# Patient Record
Sex: FEMALE | Race: WHITE | Employment: UNEMPLOYED | URBAN - METROPOLITAN AREA
[De-identification: names, ages, dates, MRNs, and addresses within clinical notes are randomized per-mention and may not be internally consistent; named-entity substitution may affect disease eponyms.]

---

## 2017-07-24 ENCOUNTER — HOSPITAL ENCOUNTER (OUTPATIENT)
Dept: LAB | Age: 57
Discharge: HOME | End: 2017-07-24
Payer: COMMERCIAL

## 2017-07-24 PROCEDURE — 36415 COLL VENOUS BLD VENIPUNCTURE: CPT | Performed by: PSYCHIATRY & NEUROLOGY

## 2017-07-24 PROCEDURE — 80177 DRUG SCRN QUAN LEVETIRACETAM: CPT | Performed by: PSYCHIATRY & NEUROLOGY

## 2017-07-26 LAB — LEVETIRACETAM SERPL-MCNC: 29.1 UG/ML (ref 10–40)

## 2018-03-14 ENCOUNTER — APPOINTMENT (OUTPATIENT)
Dept: GENERAL RADIOLOGY | Age: 58
End: 2018-03-14
Attending: EMERGENCY MEDICINE
Payer: COMMERCIAL

## 2018-03-14 ENCOUNTER — APPOINTMENT (OUTPATIENT)
Dept: CT IMAGING | Age: 58
End: 2018-03-14
Attending: NURSE PRACTITIONER
Payer: COMMERCIAL

## 2018-03-14 ENCOUNTER — HOSPITAL ENCOUNTER (EMERGENCY)
Age: 58
Discharge: HOME | End: 2018-03-14
Attending: EMERGENCY MEDICINE
Payer: COMMERCIAL

## 2018-03-14 VITALS
WEIGHT: 168 LBS | OXYGEN SATURATION: 97 % | DIASTOLIC BLOOD PRESSURE: 91 MMHG | RESPIRATION RATE: 18 BRPM | BODY MASS INDEX: 28.68 KG/M2 | TEMPERATURE: 97.9 F | SYSTOLIC BLOOD PRESSURE: 143 MMHG | HEART RATE: 81 BPM | HEIGHT: 64 IN

## 2018-03-14 DIAGNOSIS — M54.12 CERVICAL RADICULOPATHY: Primary | ICD-10-CM

## 2018-03-14 DIAGNOSIS — R03.0 ELEVATED BLOOD PRESSURE READING: ICD-10-CM

## 2018-03-14 DIAGNOSIS — R93.7 ABNORMAL CT SCAN, CERVICAL SPINE: ICD-10-CM

## 2018-03-14 PROCEDURE — 74011250637 HC RX REV CODE- 250/637: Performed by: NURSE PRACTITIONER

## 2018-03-14 PROCEDURE — 93005 ELECTROCARDIOGRAM TRACING: CPT

## 2018-03-14 PROCEDURE — 73030 X-RAY EXAM OF SHOULDER: CPT

## 2018-03-14 PROCEDURE — 72125 CT NECK SPINE W/O DYE: CPT

## 2018-03-14 PROCEDURE — 99283 EMERGENCY DEPT VISIT LOW MDM: CPT

## 2018-03-14 RX ORDER — LEVOTHYROXINE SODIUM 100 UG/1
100 TABLET ORAL
COMMUNITY

## 2018-03-14 RX ORDER — IBUPROFEN 600 MG/1
600 TABLET ORAL
Qty: 20 TAB | Refills: 0 | Status: SHIPPED | OUTPATIENT
Start: 2018-03-14 | End: 2021-06-23

## 2018-03-14 RX ORDER — PRAVASTATIN SODIUM 40 MG/1
40 TABLET ORAL
COMMUNITY
End: 2021-01-19

## 2018-03-14 RX ORDER — BISMUTH SUBSALICYLATE 262 MG
1 TABLET,CHEWABLE ORAL DAILY
COMMUNITY

## 2018-03-14 RX ORDER — OXYCODONE AND ACETAMINOPHEN 5; 325 MG/1; MG/1
1 TABLET ORAL
Qty: 12 TAB | Refills: 0 | Status: SHIPPED | OUTPATIENT
Start: 2018-03-14 | End: 2021-06-23

## 2018-03-14 RX ORDER — PREDNISONE 10 MG/1
TABLET ORAL
Qty: 30 TAB | Refills: 0 | Status: SHIPPED | OUTPATIENT
Start: 2018-03-14 | End: 2019-01-07 | Stop reason: ALTCHOICE

## 2018-03-14 RX ORDER — OXYCODONE AND ACETAMINOPHEN 5; 325 MG/1; MG/1
1 TABLET ORAL
Status: COMPLETED | OUTPATIENT
Start: 2018-03-14 | End: 2018-03-14

## 2018-03-14 RX ADMIN — OXYCODONE HYDROCHLORIDE AND ACETAMINOPHEN 1 TABLET: 5; 325 TABLET ORAL at 22:33

## 2018-03-14 NOTE — ED TRIAGE NOTES
Patient states left shoulder pain x couple weeks. Denies any known injury. Patient states worsening of left shoulder pain over past three days.

## 2018-03-15 ENCOUNTER — OFFICE VISIT (OUTPATIENT)
Dept: ORTHOPEDIC SURGERY | Age: 58
End: 2018-03-15

## 2018-03-15 VITALS
HEART RATE: 98 BPM | RESPIRATION RATE: 18 BRPM | TEMPERATURE: 97.4 F | HEIGHT: 64 IN | SYSTOLIC BLOOD PRESSURE: 148 MMHG | OXYGEN SATURATION: 98 % | WEIGHT: 173 LBS | DIASTOLIC BLOOD PRESSURE: 77 MMHG | BODY MASS INDEX: 29.53 KG/M2

## 2018-03-15 DIAGNOSIS — M25.511 ACUTE PAIN OF RIGHT SHOULDER: Primary | ICD-10-CM

## 2018-03-15 DIAGNOSIS — M79.18 MYOFASCIAL PAIN: ICD-10-CM

## 2018-03-15 DIAGNOSIS — M54.12 CERVICAL RADICULOPATHY: ICD-10-CM

## 2018-03-15 LAB
ATRIAL RATE: 75 BPM
CALCULATED P AXIS, ECG09: 61 DEGREES
CALCULATED R AXIS, ECG10: 42 DEGREES
CALCULATED T AXIS, ECG11: -3 DEGREES
DIAGNOSIS, 93000: NORMAL
P-R INTERVAL, ECG05: 164 MS
Q-T INTERVAL, ECG07: 394 MS
QRS DURATION, ECG06: 80 MS
QTC CALCULATION (BEZET), ECG08: 439 MS
VENTRICULAR RATE, ECG03: 75 BPM

## 2018-03-15 RX ORDER — ASPIRIN 81 MG/1
325 TABLET ORAL DAILY
COMMUNITY
End: 2021-06-23

## 2018-03-15 NOTE — PROGRESS NOTES
[address]  [address]  [facility], [address]  Phone: [phone]  Fax: [phone]        Audrey Dalal  : 1960  PCP: Rachel Kohli MD  3/15/2018    NEW PATIENT      ASSESSMENT AND PLAN     Jessica Manning comes in to the office today c/o left shoulder pain that radiates into her arm and neck x 1 month. She rates her pain as a 10/10 today. She c/o paraesthesia in her right arm along a C7 distribution. Her cervical CT revealed multilevel degenerative findings, worse at C5-6 and C4-5, especially on the left side. On examination, she had diffuse tenderness to palpation along her LUE and her left trapezius. She is neurologically intact. She had a positive Spurling's sign and positive Hawkin's test on the L Her pain is likely multifactorial with components of myofascial pain, cervical radiculopathy, and possibly a left rotator cuff injury. I referred to physical therapy. I also referred to orthopedics for evaluation of her left shoulder pain. I advised she begin her steroid pack that she received from the ER yesterday. Pt will f/u in 6 weeks or sooner if needed. Diagnoses and all orders for this visit:    1. Acute pain of right shoulder  -     REFERRAL TO PHYSICAL THERAPY  -     REFERRAL TO ORTHOPEDICS    2. Cervical radiculopathy  -     REFERRAL TO PHYSICAL THERAPY    3. Myofascial pain  -     REFERRAL TO PHYSICAL THERAPY       Follow-up Disposition: Not on File    CHIEF COMPLAINT  Jessica Manning is seen today in consultation at the request of Rachel Kohli MD for complaints of left shoulder pain. HISTORY OF PRESENT ILLNESS  Jessica Manning is a 62 y.o. female c/o left shoulder pain radiating into her arm and neck x 1 month. She has been taking ibuprofen and other NSAIDs for relief. She is a pharmacy tech. Pt denies any fevers, chills, nausea, vomiting.  Pt denies any chest pain and shortness of breath. Pt denies any ear, nose, and throat problems. Pt denies any fecal or urinary incontinence. PAST MEDICAL HISTORY   Past Medical History:   Diagnosis Date    Chronic pain     Diabetes ([facility] [address]     Epilepsy ([facility] [address]     HNP (herniated nucleus pulposus), lumbar     Hot flashes     Hypercholesterolemia     Hypothyroidism     Migraine     MRSA bacteremia     Multilevel degenerative disc disease     Patient reports no asthma    Radiculitis     Right lower extremity    Right leg pain     Seizures (HCC)     Weight loss        Past Surgical History:   Procedure Laterality Date    HX APPENDECTOMY      HX HYSTERECTOMY      HX PELVIC LAPAROSCOPY      HX TUBAL LIGATION         MEDICATIONS    Current Outpatient Prescriptions   Medication Sig Dispense Refill    aspirin delayed-release 81 mg tablet Take  by mouth daily.  levothyroxine (SYNTHROID) 100 mcg tablet Take  by mouth Daily (before breakfast).  pravastatin (PRAVACHOL) 40 mg tablet Take 40 mg by mouth nightly.  multivitamin (ONE A DAY) tablet Take 1 Tab by mouth daily.  predniSONE (STERAPRED DS) 10 mg dose pack SIG:  Take 4 tabs days 1,2,3  Take 3 tabs days 4,5,6  Take 2 tabs days 7,8,9  Take 1 tab days 10, 11, 12 30 Tab 0    oxyCODONE-acetaminophen (PERCOCET) 5-325 mg per tablet Take 1 Tab by mouth every six (6) hours as needed for Pain. Max Daily Amount: 4 Tabs. 12 Tab 0    ibuprofen (MOTRIN) 600 mg tablet Take 1 Tab by mouth every six (6) hours as needed for Pain. 20 Tab 0    cholecalciferol, vitamin D3, (VITAMIN D3) 2,000 unit tab Take  by mouth.  levETIRAcetam (KEPPRA) 1,000 mg tablet Take 1 Tab by mouth two (2) times a day. (Patient taking differently: Take 2,000 mg by mouth two (2) times a day.) 60 Tab 0    cetirizine (ZYRTEC) 10 mg tablet Take  by mouth daily.          ALLERGIES  Allergies   Allergen Reactions    Azithromycin Seizures    Erythromycin Seizures     Reacts with Keppra    Tomato Rash SOCIAL HISTORY    Social History     Social History    Marital status:      Spouse name: N/A    Number of children: N/A    Years of education: N/A     Social History Main Topics    Smoking status: Current Every Day Smoker     Packs/day: 0.25     Years: 36.00    Smokeless tobacco: Current User    Alcohol use Yes      Comment: occasional    Drug use: None    Sexual activity: Not Asked      Comment: Hysterectomy     Other Topics Concern    None     Social History Narrative       FAMILY HISTORY  History reviewed. No pertinent family history. REVIEW OF SYSTEMS  Review of Systems   Musculoskeletal: Positive for neck pain. Left shoulder pain  LUE pain         PHYSICAL EXAMINATION  Visit Vitals    /77    Pulse 98    Temp 97.4 °F (36.3 °C) (Oral)    Resp 18    Ht 5' 4\" (1.626 m)    Wt 173 lb (78.5 kg)    SpO2 98%    BMI 29.7 kg/m2         Pain Assessment  3/15/2018   Location of Pain Neck   Location Modifiers -   Severity of Pain 10   Quality of Pain -   Duration of Pain -   Frequency of Pain Constant   Date Pain First Started -   Aggravating Factors Stretching;Straightening;Bending;Exercise;Kneeling;Squatting;Standing;Walking;Stairs   Aggravating Factors Comment -   Limiting Behavior -   Relieving Factors Nothing   Relieving Factors Comment -   Result of Injury -         Constitutional:  Well developed, well nourished, in no acute distress. Psychiatric: Affect and mood are appropriate. HEENT: Normocephalic, atraumatic. Extraocular movements intact. Integumentary: No rashes or abrasions noted on exposed areas. Cardiovascular: Regular rate and rhythm. Pulmonary: Clear to auscultation bilaterally. SPINE/MUSCULOSKELETAL EXAM    Cervical spine:  Neck is midline. Normal muscle tone. No focal atrophy is noted. ROM painful on L. Shoulder ROM intact. Tenderness to palpation of left trapezius and cervical paraspinals. Positive Spurling's sign on L.    Negative Tinel's sign. Negative Javier's sign. Sensation in the bilateral arms grossly intact to light touch. Lumbar spine:  No rash, ecchymosis, or gross obliquity. No fasciculations. No focal atrophy is noted. No pain with hip ROM. Full range of motion. No tenderness to palpation. No tenderness to palpation at the sciatic notch. SI joints non-tender. Trochanters non tender. Sensation in the bilateral legs grossly intact to light touch. Left upper extremity  Diffuse tenderness to palpation  Painful with internal rotation of left shoulder  Positive Hawkin's sign on L       MOTOR:      Biceps  Triceps Deltoids Wrist Ext Wrist Flex Hand Intrin   Right 5/5 5/5 5/5 5/5 5/5 5/5   Left 5/5 5/5 5/5 5/5 5/5 5/5             Hip Flex  Quads Hamstrings Ankle DF EHL Ankle PF   Right 5/5 5/5 5/5 5/5 5/5 5/5   Left 5/5 5/5 5/5 5/5 5/5 5/5     DTRs are 2+ biceps, triceps, brachioradialis, patella, and Achilles. Negative Straight Leg raise. Squat not tested. No difficulty with tandem gait. Ambulation without assistive device. FWB. RADIOGRAPHS  CT Cervical spine images taken on 3/15/18 personally reviewed with patient:  FINDINGS:     No evidence of acute fracture or subluxation is detected. Uncovertebral  osteophyte development is noted at C3-4, C4-5, C5-6 levels, more pronounced on  the left side in the right, most pronounced at C5-6 followed by C4-5. Related  to the presence of these uncovertebral osteophytes, there is evidence of  foraminal narrowing on the left side at C4-5 and C5-6. Endplate osteophytes are  observed at C3-4, C4-5 and C5-6 levels, with most pronounced endplate  osteophytes at C5-6 followed by C4-5. There is posterior disc-osteophyte  complex at these levels as well which may produce slight central canal  narrowing. Mild facet hypertrophy.     Visualized portions of the lung apices are clear.   No significant prevertebral  soft tissue edema is observed.     IMPRESSION  IMPRESSION:     1. No acute fracture or subluxation. 2.  Degenerative changes of the cervical spine involving the C3-4, C4-5 and C5-6  levels, most pronounced at C5-6 followed by C4-5 especially on the left side. Neural foraminal narrowing on the left side. CT Cervical spine images taken on 3/15/18 personally reviewed with patient:  FINDINGS:      Three views have been obtained. There is no evidence of fracture or dislocation. The joint spaces are maintained. Mineralization is normal.  There is no  radiopaque foreign body.     IMPRESSION  IMPRESSION:     Negative.  reviewed    Ms. Alexys Fuentes has a reminder for a \"due or due soon\" health maintenance. I have asked that she contact her primary care provider for follow-up on this health maintenance. 20 minutes of face-to-face contact were spent with the patient during today's visit extensively discussing symptoms and treatment plan. All questions were answered. More than half of this visit today was spent on counseling. Written by Tk Tijerina, as dictated by Dr. Brandon Cordova. I, Dr. Brandon Cordova, confirm that all documentation is accurate.

## 2018-03-15 NOTE — MR AVS SNAPSHOT
Theo Gabriella 
 
 
 [address] [facility] [address] 
[phone] Patient: Jie Robles MRN: [id number] Visit Information Date & Time Provider Department Dept. Phone Encounter #  
 3/15/2018  1:45 PM Rey Hitchcock MD [facility] [phone] [id number] Follow-up Instructions Return in about 6 weeks (around 4/26/2018). Upcoming Health Maintenance Date Due Hepatitis C Screening 1960 Pneumococcal 19-64 Medium Risk (1 of 1 - PPSV23) 7/11/1979 DTaP/Tdap/Td series (1 - Tdap) 7/11/1981 PAP AKA CERVICAL CYTOLOGY 7/11/1981 FOBT Q 1 YEAR AGE 50-75 7/11/2010 Influenza Age 5 to Adult 8/1/2017 BREAST CANCER SCRN MAMMOGRAM 7/20/2018 Allergies as of 3/15/2018  Review Complete On: 3/15/2018 By: Ysabel Jones LPN Severity Noted Reaction Type Reactions Azithromycin High 07/06/2013    Seizures Erythromycin  04/17/2014    Seizures Reacts with Keppra Tomato    Rash Current Immunizations  Never Reviewed No immunizations on file. Not reviewed this visit You Were Diagnosed With   
  
 Codes Comments Acute pain of right shoulder    -  Primary ICD-10-CM: M25.511 ICD-9-CM: 719.41 Cervical radiculopathy     ICD-10-CM: M54.12 
ICD-9-CM: 723.4 Myofascial pain     ICD-10-CM: M79.1 ICD-9-CM: 729.1 Vitals BP Pulse Temp Resp Height(growth percentile) Weight(growth percentile) 148/77 98 97.4 °F (36.3 °C) (Oral) 18 5' 4\" (1.626 m) 173 lb (78.5 kg) SpO2 BMI OB Status Smoking Status 98% 29.7 kg/m2 Hysterectomy Current Every Day Smoker BMI and BSA Data Body Mass Index Body Surface Area  
 29.7 kg/m 2 1.88 m 2 Preferred Pharmacy Pharmacy Name Phone [id number] [address] [phone] Your Updated Medication List  
  
   
 This list is accurate as of 3/15/18  2:35 PM.  Always use your most recent med list.  
  
  
  
  
 aspirin delayed-release 81 mg tablet Take  by mouth daily. ibuprofen 600 mg tablet Commonly known as:  MOTRIN Take 1 Tab by mouth every six (6) hours as needed for Pain.  
  
 levETIRAcetam 1,000 mg tablet Commonly known as:  KEPPRA Take 1 Tab by mouth two (2) times a day. levothyroxine 100 mcg tablet Commonly known as:  SYNTHROID Take  by mouth Daily (before breakfast). multivitamin tablet Commonly known as:  ONE A DAY Take 1 Tab by mouth daily. oxyCODONE-acetaminophen 5-325 mg per tablet Commonly known as:  PERCOCET Take 1 Tab by mouth every six (6) hours as needed for Pain. Max Daily Amount: 4 Tabs. pravastatin 40 mg tablet Commonly known as:  PRAVACHOL Take 40 mg by mouth nightly. predniSONE 10 mg dose pack Commonly known as:  STERAPRED DS  
SIG: Take 4 tabs days 1,2,3 Take 3 tabs days 4,5,6 Take 2 tabs days 7,8,9 Take 1 tab days 10, 11, 12 VITAMIN D3 2,000 unit Tab Generic drug:  cholecalciferol (vitamin D3) Take  by mouth. ZyrTEC 10 mg tablet Generic drug:  cetirizine Take  by mouth daily. We Performed the Following REFERRAL TO ORTHOPEDICS [PSF567 Custom] Comments:  
 Left shoulder pain REFERRAL TO PHYSICAL THERAPY [GXH41 Custom] Comments:  
 eval and treat Left shoulder pain Neck pain and LUE pain Follow-up Instructions Return in about 6 weeks (around 4/26/2018). Referral Information Referral ID Referred By Referred To  
  
 [id number] KADEEM Shea [place] [facility].   
   [address] [place] [address] [address] Izabel Beal, [address] Phone: [phone] Visits Status Start Date End Date 1 New Request 3/15/18 3/15/19 If your referral has a status of pending review or denied, additional information will be sent to support the outcome of this decision. Referral ID Referred By Referred To  
 [id number] Monicadavid Cheng KADEEM Not Available Visits Status Start Date End Date 1 New Request 3/15/18 3/15/19 If your referral has a status of pending review or denied, additional information will be sent to support the outcome of this decision. Introducing [facility] SERVICES! Xavier Fair introduces [employer] patient portal. Now you can access parts of your medical record, email your doctor's office, and request medication refills online. 1. In your internet browser, go to [email or web address] 2. Click on the First Time User? Click Here link in the Sign In box. You will see the New Member Sign Up page. 3. Enter your [employer] Access Code exactly as it appears below. You will not need to use this code after youve completed the sign-up process. If you do not sign up before the expiration date, you must request a new code. · [employer] Access Code: [email or web address] Expires: 6/12/2018 10:32 PM 
 
4. Enter the last four digits of your Social Security Number (xxxx) and Date of Birth (mm/dd/yyyy) as indicated and click Submit. You will be taken to the next sign-up page. 5. Create a [employer] ID. This will be your [employer] login ID and cannot be changed, so think of one that is secure and easy to remember. 6. Create a [employer] password. You can change your password at any time. 7. Enter your Password Reset Question and Answer. This can be used at a later time if you forget your password. 8. Enter your e-mail address. You will receive e-mail notification when new information is available in [address] 9. Click Sign Up. You can now view and download portions of your medical record. 10. Click the Download Summary menu link to download a portable copy of your medical information. If you have questions, please visit the Frequently Asked Questions section of the [employer] website.  Remember, [employer] is NOT to be used for urgent needs. For medical emergencies, dial 911. Now available from your iPhone and Android! Please provide this summary of care documentation to your next provider. Your primary care clinician is listed as Edwige Panda. If you have any questions after today's visit, please call [phone].

## 2018-03-15 NOTE — ED PROVIDER NOTES
HPI Comments: 8:04 PM   62 y.o. female presents to ED C/O left shoulder. Patient has a HX of chronic pain, seizures, diabetes, HNP, hysterectomy, appendectomy, . Patient reports left shoulder pain for two weeks, but much worse over the last three days. patient denies injury or trauma, denies change in activity level. Patient reports the left shoulder pain radiates down left arm, and also radiates to the top of the chest region and the upper back. Patient reports she has been taking Tyelnol and Motrin without improvementin pain. Patient also reports neck pain. Patient reports she is unable to lay on left arm due to pain but if she moves arm sometimes she can get pain to improve,  Patient repots the left hand also feels weaker. Patient is right hand dominant. Patient smokes 1/4ppd. Patient denies any other symptoms or complaints. The history is provided by the patient. History limited by: No language barrier. Past Medical History:   Diagnosis Date    Chronic pain     [facility] [address]     Epilepsy ([facility] [address]     HNP (herniated nucleus pulposus), lumbar     Hot flashes     Hypercholesterolemia     Hypothyroidism     Migraine     MRSA bacteremia     Multilevel degenerative disc disease     Patient reports no asthma    Radiculitis     Right lower extremity    Right leg pain     Seizures (HCC)     Weight loss        Past Surgical History:   Procedure Laterality Date    HX APPENDECTOMY      HX HYSTERECTOMY      HX PELVIC LAPAROSCOPY      HX TUBAL LIGATION           History reviewed. No pertinent family history. Social History     Social History    Marital status:      Spouse name: N/A    Number of children: N/A    Years of education: N/A     Occupational History    Not on file.      Social History Main Topics    Smoking status: Current Every Day Smoker     Packs/day: 0.25     Years: 36.00    Smokeless tobacco: Not on file    Alcohol use Yes      Comment: occasional    Drug use: Not on file    Sexual activity: Not on file      Comment: Hysterectomy     Other Topics Concern    Not on file     Social History Narrative         ALLERGIES: Azithromycin; Erythromycin; and Tomato    Review of Systems   Constitutional: Negative for appetite change and fever. HENT: Negative for congestion, rhinorrhea and sore throat. Respiratory: Negative for cough, shortness of breath and wheezing. Cardiovascular: Negative for chest pain and leg swelling. Gastrointestinal: Negative for abdominal pain, constipation, diarrhea, nausea and vomiting. Genitourinary: Negative for dysuria. Musculoskeletal: Positive for arthralgias, myalgias and neck pain. Negative for back pain. Neurological: Positive for numbness. Negative for dizziness, syncope and headaches. All other systems reviewed and are negative. Vitals:    03/14/18 1917   BP: (!) 143/91   Pulse: 81   Resp: 18   Temp: 97.9 °F (36.6 °C)   SpO2: 97%   Weight: 76.2 kg (168 lb)   Height: 5' 4\" (1.626 m)            Physical Exam   Constitutional: She is oriented to person, place, and time. She appears well-developed and well-nourished. No distress. Patient appears uncomfortable. HENT:   Head: Atraumatic. Eyes: Conjunctivae and EOM are normal.   Neck: Normal range of motion. Muscular tenderness present. No spinous process tenderness present. Cardiovascular: Normal rate, regular rhythm and normal heart sounds. Pulses:       Radial pulses are 2+ on the right side, and 2+ on the left side. Pulmonary/Chest: Effort normal and breath sounds normal. No respiratory distress. She has no wheezes. She has no rales. Musculoskeletal:        Right shoulder: She exhibits tenderness and pain. She exhibits normal range of motion, no bony tenderness, normal pulse and normal strength. Right elbow: She exhibits normal range of motion, no swelling, no effusion and no deformity.         Right wrist: She exhibits normal range of motion, no tenderness and no bony tenderness. No limitation in ROM of left arm, increased pain with extension of left arm over head.  strength of left hand is 4/5 compared to right, reports too painful to squeeze harder. Positive Spurling's test - left     Neurological: She is alert and oriented to person, place, and time. She exhibits normal muscle tone. Coordination normal.   Skin: Skin is warm and dry. No rash noted. She is not diaphoretic. No erythema. No pallor. Nursing note and vitals reviewed. MDM  Number of Diagnoses or Management Options  Abnormal CT scan, cervical spine:   Cervical radiculopathy:   Elevated blood pressure reading:   Diagnosis management comments: MDM:  Concern for new onset of cervical radiculopthy due to HPI, and positive Spurling's test.  Nurse ordered left shoulder xray in triage. I will order CT cervical spine due to new onset of symptoms. Although low concern for cardiac etiology, will get EKG due to no known injury causing pain.   -Progress -   EKG - Normal sinus rhythm Cannot rule out Anterior infarct (cited on or before 14-MAR-2018) Abnormal ECG When compared with ECG of 03-NOV-2016 11:55, No significant change was found  -left shoulder xray - FINDINGS:      Three views have been obtained. There is no evidence of fracture or dislocation. The joint spaces are maintained. Mineralization is normal.  There is no  radiopaque foreign body. -CT cervical spine - FINDINGS:     No evidence of acute fracture or subluxation is detected. Uncovertebral  osteophyte development is noted at C3-4, C4-5, C5-6 levels, more pronounced on  the left side in the right, most pronounced at C5-6 followed by C4-5. Related  to the presence of these uncovertebral osteophytes, there is evidence of  foraminal narrowing on the left side at C4-5 and C5-6. Endplate osteophytes are  observed at C3-4, C4-5 and C5-6 levels, with most pronounced endplate  osteophytes at C5-6 followed by C4-5.   There is posterior disc-osteophyte  complex at these levels as well which may produce slight central canal  narrowing. Mild facet hypertrophy.     Visualized portions of the lung apices are clear. No significant prevertebral  soft tissue edema is observed.  -patient informed of results, will refer to orthopedist for further evaluation, possible PT referral.  Patient given short course of pain medication, and steroid taper. No concerning neurological deficits noted, no surgical findings on CT cervical spine, patient is appropriate for discharge home. Patient referred to PCP for further evaluation of elevated blood pressure. Patient educated to return to the ED for any new or worsening symptoms. Questions denied. Amount and/or Complexity of Data Reviewed  Tests in the radiology section of CPT®: ordered and reviewed  Discuss the patient with other providers: yes (Dr Lisbet Yañez)          ED Course       Procedures             RESULTS:    XR SHOULDER LT AP/LAT MIN 2 V   Final Result      CT SPINE CERV WO CONT   Final Result          Labs Reviewed - No data to display    Recent Results (from the past 12 hour(s))   EKG, 12 LEAD, INITIAL    Collection Time: 03/14/18  9:01 PM   Result Value Ref Range    Ventricular Rate 75 BPM    Atrial Rate 75 BPM    P-R Interval 164 ms    QRS Duration 80 ms    Q-T Interval 394 ms    QTC Calculation (Bezet) 439 ms    Calculated P Axis 61 degrees    Calculated R Axis 42 degrees    Calculated T Axis -3 degrees    Diagnosis       Normal sinus rhythm  Cannot rule out Anterior infarct (cited on or before 14-MAR-2018)  Abnormal ECG  When compared with ECG of 03-NOV-2016 11:55,  No significant change was found         PROGRESS NOTE:   8:04 PM   Initial assessment completed. Written by Sarahy BRYANT    One or more blood pressure readings were noted elevated during the Pt's presentation in the emergency department this date.   This abnormal reading has been cited in the Pt's diagnosis, and they have been encouraged to follow up with their primary care physician, or referred to a consultant for further evaluation and treatment. DISCHARGE NOTE:    Chari Hare's  results have been reviewed with her. She has been counseled regarding her diagnosis, treatment, and plan. She verbally conveys understanding and agreement of the signs, symptoms, diagnosis, treatment and prognosis and additionally agrees to follow up as discussed. She also agrees with the care-plan and conveys that all of her questions have been answered. I have also provided discharge instructions for her that include: educational information regarding their diagnosis and treatment, and list of reasons why they would want to return to the ED prior to their follow-up appointment, should her condition change. CLINICAL IMPRESSION:    1. Cervical radiculopathy    2. Elevated blood pressure reading    3. Abnormal CT scan, cervical spine        AFTER VISIT PLAN:    Current Discharge Medication List      START taking these medications    Details   predniSONE (STERAPRED DS) 10 mg dose pack SIG:  Take 4 tabs days 1,2,3  Take 3 tabs days 4,5,6  Take 2 tabs days 7,8,9  Take 1 tab days 10, 11, 12  Qty: 30 Tab, Refills: 0      oxyCODONE-acetaminophen (PERCOCET) 5-325 mg per tablet Take 1 Tab by mouth every six (6) hours as needed for Pain. Max Daily Amount: 4 Tabs. Qty: 12 Tab, Refills: 0    Associated Diagnoses: Cervical radiculopathy      ibuprofen (MOTRIN) 600 mg tablet Take 1 Tab by mouth every six (6) hours as needed for Pain.   Qty: 20 Tab, Refills: 0              Follow-up Information     Follow up With Details Comments Contact Info    Wyatt Franlkin MD Schedule an appointment as soon as possible for a visit in 1 week Further evaluation [address] [phone]      [address] [facility], [address] and [address] [facility] Schedule an appointment as soon as possible for a visit in 1 week Further evaluation [address], [address]  [phone]           Written by Marjan GRC

## 2018-03-16 ENCOUNTER — TELEPHONE (OUTPATIENT)
Dept: ORTHOPEDIC SURGERY | Age: 58
End: 2018-03-16

## 2018-03-20 ENCOUNTER — OFFICE VISIT (OUTPATIENT)
Dept: ORTHOPEDIC SURGERY | Facility: CLINIC | Age: 58
End: 2018-03-20

## 2018-03-20 VITALS
SYSTOLIC BLOOD PRESSURE: 128 MMHG | RESPIRATION RATE: 18 BRPM | HEART RATE: 78 BPM | WEIGHT: 170.6 LBS | BODY MASS INDEX: 29.12 KG/M2 | DIASTOLIC BLOOD PRESSURE: 60 MMHG | TEMPERATURE: 96.8 F | OXYGEN SATURATION: 99 % | HEIGHT: 64 IN

## 2018-03-20 DIAGNOSIS — M75.102 ROTATOR CUFF SYNDROME, LEFT: Primary | ICD-10-CM

## 2018-03-20 NOTE — PROGRESS NOTES
Patient: Victoria Burk                MRN: [id number]       SSN: [id number]  YOB: 1960        AGE: 62 y.o. SEX: female  Body mass index is 29.28 kg/(m^2). PCP: Asael Emerson MD  03/20/18    Chief Complaint: Left shoulder pain/numbness/tingling left arm    HISTORY OF PRESENT ILLNESS: Ms. Gauri Luo is a 59-year-old, right-hand dominant female who presents today with one month of left shoulder neck and arm pain. She states that the pain started insidiously. She has pain that radiates past her elbow. It goes down somewhat into her hand. She also notes some persistent achy pain, as well as decreased sensation in the left arm as compared to the right. She has pain in her neck. She has pain in her periscapular musculature. She does not recall any specific events that cause the pain. She is taking Ibuprofen and Tylenol as well as a steroid desire which helps some but has not completely alleviated her pain. She is not currently in physical therapy yet but has been seen for her neck by Dr. Jean Paul Thompson and prescribed physical therapy. She has not had any injections in her shoulder. She was seen in the emergency room where she got x-rays of her left shoulder as well as a CT scan of her neck. She has not had any advanced imaging. She also notes some weakness in her hands. Past Medical History:   Diagnosis Date    Chronic pain     Diabetes ([address]     Epilepsy ([facility] [address]     HNP (herniated nucleus pulposus), lumbar     Hot flashes     Hypercholesterolemia     Hypothyroidism     Migraine     MRSA bacteremia     Multilevel degenerative disc disease     Patient reports no asthma    Radiculitis     Right lower extremity    Right leg pain     Seizures (HCC)     Weight loss        History reviewed. No pertinent family history. Current Outpatient Prescriptions   Medication Sig Dispense Refill    aspirin delayed-release 81 mg tablet Take  by mouth daily.       levothyroxine (SYNTHROID) 100 mcg tablet Take  by mouth Daily (before breakfast).  pravastatin (PRAVACHOL) 40 mg tablet Take 40 mg by mouth nightly.  multivitamin (ONE A DAY) tablet Take 1 Tab by mouth daily.  predniSONE (STERAPRED DS) 10 mg dose pack SIG:  Take 4 tabs days 1,2,3  Take 3 tabs days 4,5,6  Take 2 tabs days 7,8,9  Take 1 tab days 10, 11, 12 30 Tab 0    oxyCODONE-acetaminophen (PERCOCET) 5-325 mg per tablet Take 1 Tab by mouth every six (6) hours as needed for Pain. Max Daily Amount: 4 Tabs. 12 Tab 0    ibuprofen (MOTRIN) 600 mg tablet Take 1 Tab by mouth every six (6) hours as needed for Pain. 20 Tab 0    cholecalciferol, vitamin D3, (VITAMIN D3) 2,000 unit tab Take  by mouth.  levETIRAcetam (KEPPRA) 1,000 mg tablet Take 1 Tab by mouth two (2) times a day. (Patient taking differently: Take 2,000 mg by mouth two (2) times a day.) 60 Tab 0    cetirizine (ZYRTEC) 10 mg tablet Take  by mouth daily. Allergies   Allergen Reactions    Azithromycin Seizures    Erythromycin Seizures     Reacts with Keppra    Tomato Rash       Past Surgical History:   Procedure Laterality Date    HX APPENDECTOMY      HX HYSTERECTOMY      HX PELVIC LAPAROSCOPY      HX TUBAL LIGATION         Social History     Social History    Marital status:      Spouse name: N/A    Number of children: N/A    Years of education: N/A     Occupational History    Not on file.      Social History Main Topics    Smoking status: Current Every Day Smoker     Packs/day: 0.25     Years: 36.00    Smokeless tobacco: Current User    Alcohol use Yes      Comment: occasional    Drug use: Not on file    Sexual activity: Not on file      Comment: Hysterectomy     Other Topics Concern    Not on file     Social History Narrative       REVIEW OF SYSTEMS:      CON: negative for recent weight loss/gain, fever, or chills  EYE: negative for double or blurry vision  ENT: negative for hoarseness  RS:   negative for cough, URI, SOB  CV:  negative for chest pain, palpitations  GI:    negative for blood in stool, nausea/vomiting  :  negative for blood in urine  MS: As per HPI  Other systems reviewed and noted below. PHYSICAL EXAMINATION:  Visit Vitals    /60    Pulse 78    Temp 96.8 °F (36 °C) (Oral)    Resp 18    Ht 5' 4\" (1.626 m)    Wt 170 lb 9.6 oz (77.4 kg)    SpO2 99%    BMI 29.28 kg/m2     Body mass index is 29.28 kg/(m^2). GENERAL: Alert and oriented x3, in no acute distress, well-developed, well-nourished. HEENT: Normocephalic, atraumatic. RESP: Non labored breathing with equal chest rise on inspiration. CV: Well perfused extremities. No cyanosis or clubbing noted. ABDOMEN: Soft, non-tender, non-distended. PHYSICAL EXAMINATION:  Examination of the cervical spine reveals limitation in motion due to pain with flexion, extension, and lateral rotation. She has positive Spurlings bilaterally. Her strength is 5/5 from C5 to T1 in the bilateral upper extremities with no focal neurological deficits. Examination of the left shoulder reveals no obvious deformity. No effusion, warmth, or erythema. She is mildly tender to palpation over the [facility] joint. Her range of motion is full when compared to the right shoulder with forward flexion of 170º, external rotation of 40º, and internal rotation to the lower thoracic spine. She has pain without weakness with supraspinatus, infraspinatus, and subscapularis testing. She has a positive impingement sign. She also has mild pain with biceps stress testing. She has 5/5 strength in the deltoid. She has periscapular muscular tenderness to palpation as well. RADIOGRAPHS:  X-rays of her left shoulder were reviewed in the office today. They do not show any acute or chronic bony abnormalities. No fractures or dislocations. ASSESSMENT/PLAN:  Ms. Padmini Poole is a 66-year-old female with left shoulder and arm pain, as well as neck pain.   I think is multifactorial. She is seeing Dr. Blanca Hill for her neck who has recommended physical therapy. For her shoulder I think she has some rotator cuff tendinitis. I do not think she has a full thickness tear as her strength is good. I recommended we add physical therapy for her shoulder to her neck physical therapy. I wrote a prescription for this today. If her symptoms persist, the next step would be to get advanced imaging like a MRI. I would like to see her back in six weeks to assess how she is doing.           Electronically signed by: Goldy Sharp MD

## 2018-03-20 NOTE — PATIENT INSTRUCTIONS
Rotator Cuff Problems: Care Instructions  Your Care Instructions  The rotator cuff is a group of tendons and muscles around the shoulder that keeps the shoulder joint stable and allows you to raise and rotate your arm. Over time, daily wear and exercise can cause the tendons to rub on the bones of your shoulder. This is called impingement. This condition may cause the tendons to bruise, degenerate, or tear. In many people, these problems do not cause pain. When they do cause pain, you can use rest, physical therapy, ice and heat, and anti-inflammatory medicine to reduce pain and swelling. If you still have pain after trying these treatments, you and your doctor can discuss having a steroid injection or surgery. Follow-up care is a key part of your treatment and safety. Be sure to make and go to all appointments, and call your doctor if you are having problems. It's also a good idea to know your test results and keep a list of the medicines you take. How can you care for yourself at home? · Be safe with medicines. Read and follow all instructions on the label. ¨ If the doctor gave you a prescription medicine for pain, take it as prescribed. ¨ If you are not taking a prescription pain medicine, ask your doctor if you can take an over-the-counter medicine. · Put ice or a cold pack on your shoulder for 10 to 20 minutes at a time. Try to do this every 1 to 2 hours for the next 3 days (when you are awake). Put a thin cloth between the ice pack and your skin. · After 3 days, put a warm, wet towel on your shoulder. This is to relax the muscles and increase blood flow. While holding the towel on your shoulder, lean forward so your arm hangs freely, and gently swing your arm back and forth like a pendulum. You also can do this standing under a warm shower. · Follow your doctor's advice for physical therapy. When your doctor says it is okay, try these stretching exercises. Do them slowly to avoid injury.  Put a warm, wet towel on your shoulder before exercising. Stop any exercise that increases pain. ¨ Range-of-motion exercises. If it is not too painful, stretch your arm in four directions: across the body, up the back, to the side, and overhead. ¨ Pendulum exercise. Lean forward and hold onto a table or the back of a chair with your good arm. Bend at the waist, letting the arm with the sore shoulder hang straight down. Swing your arm back and forth like a pendulum, then in circles that start small and slowly grow larger. This exercise does not use the arm muscles. Instead, use your legs and your hips to create movement that makes your arm swing freely. Try this for about 5 minutes, several times a day. ¨ Wall climbing (to the side). Stand with your side to a wall so that your fingers can just touch it. Then turn so your body is turned slightly toward the wall. Walk the fingers of your injured arm up the wall as high as pain permits. Try not to shrug your shoulder up toward your ear as you move your arm up. Hold that position for a count of 15 to 30 seconds. Walk your fingers down to the starting position. Repeat 2 to 4 times, trying to reach higher each time. ¨ Wall climbing (to the front). Face a wall, standing so your fingers can just touch it. Walk the fingers of your affected arm up the wall as high as pain permits. Try not to shrug your shoulder up toward your ear as you move your arm up. Hold that position for a count of 15 to 30 seconds. Slowly walk your fingers to the starting position. Repeat 2 to 4 times, trying to reach higher each time. · Rest your shoulder when you are not doing stretches and other exercises. Your doctor may tell you to wait for the pain to go away before doing exercises. Do not lift heavy bags of groceries, play sports, or do anything else that makes you twist or stress your shoulder. Avoid activities where you move your affected arm above your head.   When should you call for help?  Call your doctor now or seek immediate medical care if:  ? · You have severe pain. ? · You cannot move your shoulder or arm. ? · You have tingling or numbness in your arm or hand. ? · Your arm or hand is cool or pale. ? Watch closely for changes in your health, and be sure to contact your doctor if:  ? · Your pain gets worse. ? · You have new or worse swelling in your arm or hand. ? · You do not get better as expected. Where can you learn more? Go to http://dina-monika.info/. Enter E207 in the search box to learn more about \"Rotator Cuff Problems: Care Instructions. \"  Current as of: March 21, 2017  Content Version: 11.4  © 5028-3471 [employer]. Care instructions adapted under license by [employer] (which disclaims liability or warranty for this information). If you have questions about a medical condition or this instruction, always ask your healthcare professional. [place] [address] any warranty or liability for your use of this information.

## 2018-03-23 ENCOUNTER — HOSPITAL ENCOUNTER (OUTPATIENT)
Dept: PHYSICAL THERAPY | Age: 58
Discharge: HOME | End: 2018-03-23
Payer: COMMERCIAL

## 2018-03-23 PROCEDURE — 97162 PT EVAL MOD COMPLEX 30 MIN: CPT | Performed by: PHYSICAL THERAPIST

## 2018-03-23 NOTE — PROGRESS NOTES
[facility] [address]  [address] [facility] [address], [address] [phone] fax    Plan of Care/ Statement of Necessity for Physical Therapy Services           Patient name: Jessica Manning Start of Care: 3/23/2018   Referral source: Moreno Mcduffie MD : 1960    Medical Diagnosis: Adhesive capsulitis of left shoulder [M75.02]  Pain in right shoulder [M25.511]  Radiculopathy, cervical region [M54.12]  Myalgia [M79.1]   Onset Date:1 month ago    Treatment Diagnosis: cervical pain, L shoulder pain   Prior Hospitalization: see medical history Provider#: [id number]   Medications: Verified on Patient summary List    Comorbidities: seizures/epilepsy, thyroid issues   Prior Level of Function: Pt reports that she was previously Independent with all activities at home and 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 The Plan of Care and following information is based on the information from the initial evaluation. Assessment/ key information: Pt is a 63 y/o female who presents today with onset of cervical and L UE symptoms beginning without specific cause roughly one month ago. Pt reports that her symptoms are intermittent, but significant enough that she went to the ER 2 weeks ago due to pain. Pt notes that she is unable to sleep secondary to pain, has pain with lifting, carrying, reaching, and upper body dressing. Pt reports no headaches, but also states that this is probably controlled by her seizure medication. Pt reports that the symptoms \"move around\" and are not consistent in location. She notes intermittent L elbow, forearm, hand/finger symptoms as well as shoulder pain and pain into the L cervical spine and surrounding musculature. Pt did have imaging performed. Per examination today, pt has 75% limited cervical AROM and limited strength and ROM of the L shoulder.   Pt has increased symptoms with cervical compression, but no consistent reduction of symptoms with several trials of cervical traction (manual) today. Pt with positive ULTT for ulnar, median, and radial nerves, but with inconsistent distribution. Pt with positive Spurling's test on the L, negative on the R. Pt would benefit from skilled PT intervention to address these limitations and return her to full PLOF. Evaluation Complexity History MEDIUM  Complexity : 1-2 comorbidities / personal factors will impact the outcome/ POC ; Examination HIGH Complexity : 4+ Standardized tests and measures addressing body structure, function, activity limitation and / or participation in recreation  ;Presentation MEDIUM Complexity : Evolving with changing characteristics  ; Clinical Decision Making MEDIUM Complexity : FOTO score of 26-74  Overall Complexity Rating: MEDIUM  Problem List: pain affecting function, decrease ROM, decrease strength, decrease ADL/ functional abilitiies, decrease activity tolerance and decrease flexibility/ joint mobility   Treatment Plan may include any combination of the following: Therapeutic exercise, Therapeutic activities, Neuromuscular re-education, Physical agent/modality, Manual therapy, Patient education, Self Care training and Functional mobility training  Patient / Family readiness to learn indicated by: asking questions, trying to perform skills and interest  Persons(s) to be included in education: patient (P)  Barriers to Learning/Limitations: None  Patient Goal (s): reduce symptoms  Patient Self Reported Health Status: good  Rehabilitation Potential: fair  Short Term Goals: To be accomplished in 2 weeks:  1. Pt to report [place] with HEP. Eval status: HEP issued and reviewed physically/verbally with pt    Long Term Goals: To be accomplished in 4 weeks:  1. Pt to report score of 66 on FOTO, indicating improved tolerance to activity and reduced pain. Eval status: 48 on initial FOTO  2.    Pt to report max level of pain <4/10 with activity, indicating reduced pain and improved mobility. Eval status: max level of pain 10/10  3. Pt to demonstrate full L shoulder AROM/PROM at 160 degrees flexion/abduction, 75 degrees ER (90/90), able to reach C7 with IR behind the back  Eval status: L shoulder AROM 135 degrees flexion, 90 degrees abduction, 60 degrees ER (90/90), able to reach to L4 with behind the back IR  4. Pt to demonstrate full 5/5 L shoulder strength with MMT, indicating improved tolerance to activity and reduced pain. Eval status: 4-/5 shoulder flexion, 4-/5 abduction, 4-/5 ER, 4-/5 IR, 4-/5 bicep, 4-/5 tricep  5. Pt to be able to sleep through the night without waking secondary to pain. Eval status: currently limited secondary to pain  6. Pt to be able to return to full PLOF at home and in the community with improved mobility, strength and stability, and reduced fall risk. Eval status: limited  7. Pt to be able to perform overhead and upper body hygiene/dressing tasks without increased c/o pain or difficulty. Eval status: limited by pain and reduced ROM  Frequency / Duration: Patient to be seen 2 times per week for 8 treatments. Patient/ Caregiver education and instruction: Diagnosis, prognosis, self care, activity modification and exercises   [x]  Plan of care has been reviewed with WIN Porter, PT 3/23/2018 2:50 PM    ________________________________________________________________________    I certify that the above Therapy Services are being furnished while the patient is under my care. I agree with the treatment plan and certify that this therapy is necessary.     Physician's Signature:____________________  Date:____________Time: _________    Please sign and return to [facility] 45  [address] [place], [address] [phone] fax

## 2018-03-23 NOTE — PROGRESS NOTES
PT DAILY TREATMENT NOTE     Patient Name: Mallika Fair  Date:3/23/2018  : 1960  [x]  Patient  Verified  Payor: KAREN SAM / Plan: [address] [place] [address] / Product Type: PPO /    In time:1203  Out time:1233  Total Treatment Time (min): 30  Visit #: 1 of 8    Treatment Area: Adhesive capsulitis of left shoulder [M75.02]  Pain in right shoulder [M25.511]  Radiculopathy, cervical region [M54.12]  Myalgia [M79.1]    SUBJECTIVE  Pain Level (0-10 scale): 8  Any medication changes, allergies to medications, adverse drug reactions, diagnosis change, or new procedure performed?: [x] No    [] Yes (see summary sheet for update)  Subjective functional status/changes:   [] No changes reported  See eval.    OBJECTIVE    30 min []Eval                  []Re-Eval             With   [] TE   [] TA   [] neuro   [] other: Patient Education: [x] Review HEP    [] Progressed/Changed HEP based on:   [] positioning   [] body mechanics   [] transfers   [] heat/ice application    [] other:      Other Objective/Functional Measures: See eval.     Pain Level (0-10 scale) post treatment: 8    ASSESSMENT/Changes in Function: See eval.    Patient will continue to benefit from skilled PT services to modify and progress therapeutic interventions, address functional mobility deficits, address ROM deficits, address strength deficits, analyze and address soft tissue restrictions, analyze and cue movement patterns, analyze and modify body mechanics/ergonomics and assess and modify postural abnormalities to attain remaining goals. []  See Plan of Care  []  See progress note/recertification  []  See Discharge Summary         Progress towards goals / Updated goals:  Short Term Goals: To be accomplished in 2 weeks:  1. Pt to report [place] with HEP. Eval status: HEP issued and reviewed physically/verbally with pt    Long Term Goals: To be accomplished in 4 weeks:  1.     Pt to report score of 66 on FOTO, indicating improved tolerance to activity and reduced pain. Eval status: 48 on initial FOTO  2. Pt to report max level of pain <4/10 with activity, indicating reduced pain and improved mobility. Eval status: max level of pain 10/10  3. Pt to demonstrate full L shoulder AROM/PROM at 160 degrees flexion/abduction, 75 degrees ER (90/90), able to reach C7 with IR behind the back  Eval status: L shoulder AROM 135 degrees flexion, 90 degrees abduction, 60 degrees ER (90/90), able to reach to L4 with behind the back IR  4. Pt to demonstrate full 5/5 L shoulder strength with MMT, indicating improved tolerance to activity and reduced pain. Eval status: 4-/5 shoulder flexion, 4-/5 abduction, 4-/5 ER, 4-/5 IR, 4-/5 bicep, 4-/5 tricep  5. Pt to be able to sleep through the night without waking secondary to pain. Eval status: currently limited secondary to pain  6. Pt to be able to return to full PLOF at home and in the community with improved mobility, strength and stability, and reduced fall risk. Eval status: limited  7. Pt to be able to perform overhead and upper body hygiene/dressing tasks without increased c/o pain or difficulty.   Eval status: limited by pain and reduced ROM    PLAN  []  Upgrade activities as tolerated     []  Continue plan of care  []  Update interventions per flow sheet       []  Discharge due to:_  []  Other:_      Lindsey Chan, PT 3/23/2018  3:37 PM    Future Appointments  Date Time Provider Kyree Choudhury   4/26/2018 1:30 PM Van Hanson  [id number]

## 2018-04-25 NOTE — PROGRESS NOTES
[facility] [place] [address]  [address] [facility] [address], [address] [phone] fax    Discharge Summary  Patient name: Natalia Meza Start of Care: 3/23/2018   Referral source: Maritza Garcia MD : 1960                          Medical Diagnosis: Adhesive capsulitis of left shoulder [M75.02]  Pain in right shoulder [M25.511]  Radiculopathy, cervical region [M54.12]  Myalgia [M79.1] Onset Date:1 month ago                          Treatment Diagnosis: cervical pain, L shoulder pain   Prior Hospitalization: see medical history Provider#: [id number]   Medications: Verified on Patient summary List    Comorbidities: seizures/epilepsy, thyroid issues   Prior Level of Function: Pt reports that she was previously Independent with all activities at home and 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 Visits from Start of Care: 1    Missed Visits: 0    Reporting Period : 3/23/18 to 3/23/18    Short Term Goals: To be accomplished in 2 weeks:  1. Pt to report [place] with HEP. Eval status: HEP issued and reviewed physically/verbally with pt     Long Term Goals: To be accomplished in 4 weeks:  1. Pt to report score of 66 on FOTO, indicating improved tolerance to activity and reduced pain. Eval status: 48 on initial FOTO  2. Pt to report max level of pain <4/10 with activity, indicating reduced pain and improved mobility. Eval status: max level of pain 10/10  3. Pt to demonstrate full L shoulder AROM/PROM at 160 degrees flexion/abduction, 75 degrees ER (90/90), able to reach C7 with IR behind the back  Eval status: L shoulder AROM 135 degrees flexion, 90 degrees abduction, 60 degrees ER (90/90), able to reach to L4 with behind the back IR  4. Pt to demonstrate full 5/5 L shoulder strength with MMT, indicating improved tolerance to activity and reduced pain.   Eval status: 4-/5 shoulder flexion, 4-/5 abduction, 4-/5 ER, 4-/5 IR, 4-/5 bicep, 4-/5 tricep  5. Pt to be able to sleep through the night without waking secondary to pain. Eval status: currently limited secondary to pain  6. Pt to be able to return to full PLOF at home and in the community with improved mobility, strength and stability, and reduced fall risk. Eval status: limited  7. Pt to be able to perform overhead and upper body hygiene/dressing tasks without increased c/o pain or difficulty. Eval status: limited by pain and reduced ROM    Assessment/Summary of care: Ms. Chip Bee was seen for initial evaluation only. She was called X 2 attempts but has not since f/u with our office to schedule. We will discharge from outpatient PT services at this time.       RECOMMENDATIONS:  [x]Discontinue therapy: []Patient has reached or is progressing toward set goals      [x]Patient is non-compliant or has abdicated      []Due to lack of appreciable progress towards set [address], PT 4/25/2018 3:15 PM

## 2018-11-13 ENCOUNTER — OFFICE VISIT (OUTPATIENT)
Dept: ORTHOPEDIC SURGERY | Facility: CLINIC | Age: 58
End: 2018-11-13

## 2018-11-13 VITALS
HEIGHT: 64 IN | SYSTOLIC BLOOD PRESSURE: 134 MMHG | HEART RATE: 91 BPM | OXYGEN SATURATION: 97 % | TEMPERATURE: 97.4 F | BODY MASS INDEX: 28.85 KG/M2 | DIASTOLIC BLOOD PRESSURE: 71 MMHG | WEIGHT: 169 LBS

## 2018-11-13 DIAGNOSIS — M25.612 POST-TRAUMATIC STIFFNESS OF LEFT SHOULDER JOINT: Primary | ICD-10-CM

## 2018-11-13 DIAGNOSIS — M25.512 CHRONIC LEFT SHOULDER PAIN: ICD-10-CM

## 2018-11-13 DIAGNOSIS — M54.12 CERVICAL RADICULOPATHY: ICD-10-CM

## 2018-11-13 DIAGNOSIS — M54.2 NECK PAIN: ICD-10-CM

## 2018-11-13 DIAGNOSIS — G89.29 CHRONIC LEFT SHOULDER PAIN: ICD-10-CM

## 2018-11-13 NOTE — PROGRESS NOTES
Patient: Rachel Garcia                MRN: [id number]       SSN: [id number]  YOB: 1960        AGE: 62 y.o. SEX: female  Body mass index is 29.01 kg/m². PCP: Amee Nation MD  11/13/18    Chief Complaint: Left shoulder and arm pain    HISTORY OF PRESENT ILLNESS:  Thee Vasquez is a 43-year-old female who I have seen before who comes in to the office today for her left shoulder, as well as arm pain. She has had pain in her left shoulder and arm since she had a work-related injury. She fell off of a stool at work on April 11, 2018. She was diagnosed with a nondisplaced greater tuberosity fracture. She was treated conservatively with physical therapy. She subsequently then noticed some pain, numbness, and tingling that radiates down her arm from her shoulder and neck all the way to her fingertips. She says it involves all of her fingertips. It is on the dorsum of her forearm and hand. She also notes pain in her proximal arm and shoulder. She has difficulty with shoulder range of motion. She went to physical therapy for this, but unfortunately, that did not resolve all of her symptoms. She has also done a little bit of physical therapy for her neck but not specifically for her neck. She has had no specific treatment for her neck, and this is a Workers Compensation claim that has been denied, per her telling, of any neck related complaints. Past Medical History:   Diagnosis Date    Chronic pain     Diabetes ([facility] [address]     Epilepsy ([facility]     HNP (herniated nucleus pulposus), lumbar     Hot flashes     Hypercholesterolemia     Hypothyroidism     Migraine     MRSA bacteremia     Multilevel degenerative disc disease     Patient reports no asthma    Radiculitis     Right lower extremity    Right leg pain     Seizures (HCC)     Weight loss        History reviewed. No pertinent family history.     Current Outpatient Medications   Medication Sig Dispense Refill    aspirin delayed-release 81 mg tablet Take  by mouth daily.  levothyroxine (SYNTHROID) 100 mcg tablet Take  by mouth Daily (before breakfast).  pravastatin (PRAVACHOL) 40 mg tablet Take 40 mg by mouth nightly.  multivitamin (ONE A DAY) tablet Take 1 Tab by mouth daily.  predniSONE (STERAPRED DS) 10 mg dose pack SIG:  Take 4 tabs days 1,2,3  Take 3 tabs days 4,5,6  Take 2 tabs days 7,8,9  Take 1 tab days 10, 11, 12 30 Tab 0    oxyCODONE-acetaminophen (PERCOCET) 5-325 mg per tablet Take 1 Tab by mouth every six (6) hours as needed for Pain. Max Daily Amount: 4 Tabs. 12 Tab 0    ibuprofen (MOTRIN) 600 mg tablet Take 1 Tab by mouth every six (6) hours as needed for Pain. 20 Tab 0    cholecalciferol, vitamin D3, (VITAMIN D3) 2,000 unit tab Take  by mouth.  levETIRAcetam (KEPPRA) 1,000 mg tablet Take 1 Tab by mouth two (2) times a day. (Patient taking differently: Take 2,000 mg by mouth two (2) times a day.) 60 Tab 0    cetirizine (ZYRTEC) 10 mg tablet Take  by mouth daily.          Allergies   Allergen Reactions    Azithromycin Seizures    Erythromycin Seizures     Reacts with Keppra    Tomato Rash       Past Surgical History:   Procedure Laterality Date    HX APPENDECTOMY      HX HYSTERECTOMY      HX PELVIC LAPAROSCOPY      HX TUBAL LIGATION         Social History     Socioeconomic History    Marital status:      Spouse name: Not on file    Number of children: Not on file    Years of education: Not on file    Highest education level: Not on file   Social Needs    Financial resource strain: Not on file    Food insecurity - worry: Not on file    Food insecurity - inability: Not on file   [employer] needs - medical: Not on file   [employer] needs - non-medical: Not on file   Occupational History    Not on file   Tobacco Use    Smoking status: Current Every Day Smoker     Packs/day: 0.25     Years: 36.00     Pack years: 9.00    Smokeless tobacco: Current User Substance and Sexual Activity    Alcohol use: Yes     Comment: occasional    Drug use: Not on file    Sexual activity: Not on file     Comment: Hysterectomy   Other Topics Concern    Not on file   Social History Narrative    Not on file       REVIEW OF SYSTEMS:      CON: negative for recent weight loss/gain, fever, or chills  EYE: negative for double or blurry vision  ENT: negative for hoarseness  RS:   negative for cough, URI, SOB  CV:  negative for chest pain, palpitations  GI:    negative for blood in stool, nausea/vomiting  :  negative for blood in urine  MS: As per HPI  Other systems reviewed and noted below. PHYSICAL EXAMINATION:  Visit Vitals  /71 (BP 1 Location: Left arm, BP Patient Position: Sitting)   Pulse 91   Temp 97.4 °F (36.3 °C)   Ht 5' 4\" (1.626 m)   Wt 169 lb (76.7 kg)   SpO2 97%   BMI 29.01 kg/m²     Body mass index is 29.01 kg/m². GENERAL: Alert and oriented x3, in no acute distress, well-developed, well-nourished. HEENT: Normocephalic, atraumatic. RESP: Non labored breathing with equal chest rise on inspiration. CV: Well perfused extremities. No cyanosis or clubbing noted. ABDOMEN: Soft, non-tender, non-distended. Physical exam of the cervical spine reveals some decreased range of motion to the left in rotation. There is no significant decreased motion or pain with forward flexion or extension, as well as lateral rotation to the right. There is a negative Spurlings bilaterally. She is tender to palpation in the paraspinal musculature. There is no midline deformity or step-off. She has tenderness to palpation to the periscapular musculature, as well as over the trapezius. For her left shoulder, she has decreased shoulder range of motion with forward flexion only to about 120° active and passive with pain and attempts past this. External rotation with the arm at the side of 15°, both active and passive, and internal rotation to the posterolateral buttock. This is contrasted with 170° of forward flexion on the right, 45° of external rotation, and internal rotation to the lower thoracic spine. Gentle rotator cuff strength testing is within normal limits and does not produce the type of pain that she gets that is produced with capsular stretch at the extremes of motion. She has a negative Tinels at the carpal and cubital tunnel. She is neurovascularly intact distally. IMAGING:  X-rays of her neck were taken in the office today. These show degenerative changes at C4-5, C5-6, and C6-7 with osteophyte formation anteriorly and disc space narrowing posteriorly. There is also a slight loss of normal cervical lordosis. X-rays of the left shoulder were also taken in the office today. They do not show any acute or chronic bony abnormalities. No significant arthrosis is noted. An MRI of the left shoulder was also reviewed. This was previously done. This does not show any full-thickness rotator cuff tears. This shows a nondisplaced, what appears to be a healing fracture of the greater tuberosity. ASSESSMENT/PLAN:  Grace Mcmanus is a 59-year-old female with left shoulder pain and left arm numbness and tingling that I believe is coming from her neck. She does have shoulder issues and post-traumatic stiffness. She has been in physical therapy for this. Her options are a stretching protocol, which I did recommend, or a procedure to do a lysis of adhesions in her shoulder, such as an arthroscopic capsular release and lysis of adhesions. At this point, she is somewhat fed up with dealing with her symptoms and would like to pursue the surgical route, which I understand. She also has some numbness and tingling that radiates down her arm. Although this was not reported at the time of the injury, I do think it is related to her previous work-related injury.   Certainly, the shoulder, as well as the shoulder surgery, would be related to her work-related injury, as this is what she has been previously treated for. For her neck, I recommended referral to our spine service for further management to treat the cervical radiculopathy. She is going to check with her Workers Compensation to see if she can get care transferred to our office for further treatment of her shoulder injury. I would be happy to treat her and help her in any way that I can.             Electronically signed by: Phillip Brenner MD

## 2018-12-04 ENCOUNTER — OFFICE VISIT (OUTPATIENT)
Dept: ORTHOPEDIC SURGERY | Age: 58
End: 2018-12-04

## 2018-12-04 VITALS
RESPIRATION RATE: 18 BRPM | WEIGHT: 167 LBS | DIASTOLIC BLOOD PRESSURE: 74 MMHG | OXYGEN SATURATION: 97 % | HEIGHT: 64 IN | SYSTOLIC BLOOD PRESSURE: 121 MMHG | TEMPERATURE: 97.1 F | BODY MASS INDEX: 28.51 KG/M2 | HEART RATE: 85 BPM

## 2018-12-04 DIAGNOSIS — M67.922 TENDINOPATHY OF LEFT BICEPS TENDON: ICD-10-CM

## 2018-12-04 DIAGNOSIS — M54.12 CERVICAL RADICULOPATHY: Primary | ICD-10-CM

## 2018-12-04 RX ORDER — GABAPENTIN 300 MG/1
300 CAPSULE ORAL 3 TIMES DAILY
Qty: 90 CAP | Refills: 2 | Status: SHIPPED | OUTPATIENT
Start: 2018-12-04 | End: 2019-01-07 | Stop reason: ALTCHOICE

## 2018-12-04 NOTE — PROGRESS NOTES
[address]  [address], [address]  [place], [address] [place] [address] Street  Phone: [phone]  Fax: [phone]        Quentin Silva  : 1960  PCP: Dale Gomez MD  2018    PROGRESS NOTE      ASSESSMENT AND PLAN    Loretha Cabot comes in to the office today for left-sided neck and shoulder pain with paraesthesia in her LUE. She fell in a grocery store while working and fractured her left shoulder. Since then, she has also c/o numbness and tingling in her LUE. She has had multiple rounds of PT, and she has been under the care of two orthopedics. She has been taking the max dosage of Tylenol and Ibuprofen without much relief. She rates her pain as an 8/10 today. On examination, she has tenderness to palpation of her L biceps and positive provocative testing for a biceps. She has a positive Contreras sign on the L. She has decreased sensation in a C5/6 distribution on the L and possible pain related vs true weakness in an C5/6 distribution on the L. Her pain may be due to a cervical radiculopathy vs biceps tendinopathy. I referred for a cervical MRI. I prescribed Gabapentin 300mg TID. We may consider an EMG in the future. Pt will f/u after MRI or sooner as needed. Diagnoses and all orders for this visit:    1. Cervical radiculopathy  -     MRI CERV SPINE WO CONT; Future  -     gabapentin (NEURONTIN) 300 mg capsule; Take 1 Cap by mouth three (3) times daily. 2. Tendinopathy of left biceps tendon         Follow-up Disposition: Not on File      HISTORY OF PRESENT ILLNESS  Loretha Cabot is a 62 y.o. female. A&P / HPI from 3/15/18:  Loretha Cabot comes in to the office today c/o left shoulder pain that radiates into her arm and neck x 1 month. She rates her pain as a 10/10 today.     She c/o paraesthesia in her right arm along a C7 distribution.  Her cervical CT revealed multilevel degenerative findings, worse at C5-6 and C4-5, especially on the left side.     On examination, she had diffuse tenderness to palpation along her LUE and her left trapezius. She is neurologically intact. She had a positive Spurling's sign and positive Hawkin's test on the L Her pain is likely multifactorial with components of myofascial pain, cervical radiculopathy, and possibly a left rotator cuff injury.      I referred to physical therapy. I also referred to orthopedics for evaluation of her left shoulder pain.     I advised she begin her steroid pack that she received from the ER yesterday.     Pt will f/u in 6 weeks or sooner if needed. HISTORY OF PRESENT ILLNESS  Dagoberto Jacobs is a 62 y.o. female c/o left shoulder pain radiating into her arm and neck x 1 month.     She has been taking ibuprofen and other NSAIDs for relief.      She is a pharmacy tech.      Pt denies any fevers, chills, nausea, vomiting. Pt denies any chest pain and shortness of breath. Pt denies any ear, nose, and throat problems. Pt denies any fecal or urinary incontinence. Updates from 12/04/18:  Pt presents for left-sided neck and shoulder pain with paraesthesia in her LUE. She fell in a grocery store while working and fractured her left shoulder. Since then, she has also c/o numbness and tingling in her LUE. She has had multiple rounds of PT, and she has been under the care of two orthopedics. She has been taking the max dosage of Tylenol and Ibuprofen without much relief.       PAST MEDICAL HISTORY   Past Medical History:   Diagnosis Date    Chronic pain     [facility] [address]     Epilepsy ([facility] [address]     HNP (herniated nucleus pulposus), lumbar     Hot flashes     Hypercholesterolemia     Hypothyroidism     Migraine     MRSA bacteremia     Multilevel degenerative disc disease     Patient reports no asthma    Radiculitis     Right lower extremity    Right leg pain     Seizures (HCC)     Weight loss        Past Surgical History:   Procedure Laterality Date    HX APPENDECTOMY      HX HYSTERECTOMY      HX PELVIC LAPAROSCOPY      HX TUBAL LIGATION     . MEDICATIONS    Current Outpatient Medications   Medication Sig Dispense Refill    aspirin delayed-release 81 mg tablet Take  by mouth daily.  levothyroxine (SYNTHROID) 100 mcg tablet Take  by mouth Daily (before breakfast).  pravastatin (PRAVACHOL) 40 mg tablet Take 40 mg by mouth nightly.  multivitamin (ONE A DAY) tablet Take 1 Tab by mouth daily.  predniSONE (STERAPRED DS) 10 mg dose pack SIG:  Take 4 tabs days 1,2,3  Take 3 tabs days 4,5,6  Take 2 tabs days 7,8,9  Take 1 tab days 10, 11, 12 30 Tab 0    oxyCODONE-acetaminophen (PERCOCET) 5-325 mg per tablet Take 1 Tab by mouth every six (6) hours as needed for Pain. Max Daily Amount: 4 Tabs. 12 Tab 0    ibuprofen (MOTRIN) 600 mg tablet Take 1 Tab by mouth every six (6) hours as needed for Pain. 20 Tab 0    cholecalciferol, vitamin D3, (VITAMIN D3) 2,000 unit tab Take  by mouth.  levETIRAcetam (KEPPRA) 1,000 mg tablet Take 1 Tab by mouth two (2) times a day. (Patient taking differently: Take 2,000 mg by mouth two (2) times a day.) 60 Tab 0    cetirizine (ZYRTEC) 10 mg tablet Take  by mouth daily. ALLERGIES  Allergies   Allergen Reactions    Azithromycin Seizures    Erythromycin Seizures     Reacts with Keppra    Tomato Rash          SOCIAL HISTORY    Social History     Socioeconomic History    Marital status:      Spouse name: Not on file    Number of children: Not on file    Years of education: Not on file    Highest education level: Not on file   Tobacco Use    Smoking status: Current Every Day Smoker     Packs/day: 0.25     Years: 36.00     Pack years: 9.00    Smokeless tobacco: Current User   Substance and Sexual Activity    Alcohol use: Yes     Comment: occasional       FAMILY HISTORY  No family history on file. REVIEW OF SYSTEMS  Review of Systems   Musculoskeletal: Positive for neck pain.         L shoulder pain  LUE paraesthesia          PHYSICAL EXAMINATION  There were no vitals taken for this visit. Pain Assessment  11/13/2018   Location of Pain Arm; Shoulder   Location Modifiers Left   Severity of Pain 10   Quality of Pain Throbbing; Sharp;Dull;Aching   Duration of Pain Persistent   Frequency of Pain Constant   Date Pain First Started -   Aggravating Factors Other (Comment)   Aggravating Factors Comment -   Limiting Behavior Yes   Relieving Factors Nothing   Relieving Factors Comment -   Result of Injury Yes   Work-Related Injury Yes   Type of Injury Fall           Constitutional:  Well developed, well nourished, in no acute distress. Psychiatric: Affect and mood are appropriate. Integumentary: No rashes or abrasions noted on exposed areas. SPINE/MUSCULOSKELETAL EXAM    Cervical spine:  Neck is midline. Normal muscle tone. No focal atrophy is noted. ROM painful on L. Shoulder ROM intact. Tenderness to palpation of left trapezius and cervical paraspinals. Positive Spurling's sign on L. Negative Tinel's sign. Negative Javier's sign. Sensation in the bilateral arms grossly intact to light touch.      Lumbar spine:  No rash, ecchymosis, or gross obliquity. No fasciculations. No focal atrophy is noted. No pain with hip ROM. Full range of motion. No tenderness to palpation. No tenderness to palpation at the sciatic notch. SI joints non-tender. Trochanters non tender.      Sensation in the bilateral legs grossly intact to light touch.     Left upper extremity  Diffuse tenderness to palpation  Painful with internal rotation of left shoulder  Positive Hawkin's sign on L     MOTOR:      Biceps  Triceps Deltoids Wrist Ext Wrist Flex Hand Intrin   Right 5/5 5/5 5/5 5/5 5/5 5/5   Left 5/5 5/5 5/5 5/5 5/5 5/5             Hip Flex  Quads Hamstrings Ankle DF EHL Ankle PF   Right 5/5 5/5 5/5 5/5 5/5 5/5   Left 5/5 5/5 5/5 5/5 5/5 5/5   DTRs are 2+ biceps, triceps, brachioradialis, patella, and Achilles.     Negative Straight Leg raise. Squat not tested. No difficulty with tandem gait.      Ambulation without assistive device. FWB.       RADIOGRAPHS  CT Cervical spine images taken on 3/15/18 personally reviewed with patient:  FINDINGS:     No evidence of acute fracture or subluxation is detected.  Uncovertebral  osteophyte development is noted at C3-4, C4-5, C5-6 levels, more pronounced on  the left side in the right, most pronounced at C5-6 followed by C4-5.  Related  to the presence of these uncovertebral osteophytes, there is evidence of  foraminal narrowing on the left side at C4-5 and C5-6.  Endplate osteophytes are  observed at C3-4, C4-5 and C5-6 levels, with most pronounced endplate  osteophytes at C5-6 followed by C4-5.  There is posterior disc-osteophyte  complex at these levels as well which may produce slight central canal  narrowing.  Mild facet hypertrophy.     Visualized portions of the lung apices are clear.  No significant prevertebral  soft tissue edema is observed.     IMPRESSION  IMPRESSION:     1.  No acute fracture or subluxation. 2.  Degenerative changes of the cervical spine involving the C3-4, C4-5 and C5-6  levels, most pronounced at C5-6 followed by C4-5 especially on the left side. Neural foraminal narrowing on the left side.     CT Cervical spine images taken on 3/15/18 personally reviewed with patient:  FINDINGS:      Three views have been obtained. There is no evidence of fracture or dislocation.   The joint spaces are maintained.  Mineralization is normal.  There is no  radiopaque foreign body.     IMPRESSION  IMPRESSION:     Negative. 20 minutes of face-to-face contact were spent with the patient during today's visit extensively discussing symptoms and treatment plan. All questions were answered.  More than half of this visit today was spent on counseling.      Written by Fay Light as dictated by Bill Nichols MD

## 2018-12-13 ENCOUNTER — HOSPITAL ENCOUNTER (OUTPATIENT)
Age: 58
Discharge: HOME | End: 2018-12-13
Attending: PHYSICAL MEDICINE & REHABILITATION
Payer: COMMERCIAL

## 2018-12-13 DIAGNOSIS — M54.12 CERVICAL RADICULOPATHY: ICD-10-CM

## 2018-12-13 PROCEDURE — 72141 MRI NECK SPINE W/O DYE: CPT

## 2019-01-07 ENCOUNTER — OFFICE VISIT (OUTPATIENT)
Dept: ORTHOPEDIC SURGERY | Age: 59
End: 2019-01-07

## 2019-01-07 VITALS
HEIGHT: 64 IN | HEART RATE: 84 BPM | SYSTOLIC BLOOD PRESSURE: 113 MMHG | WEIGHT: 169 LBS | OXYGEN SATURATION: 96 % | TEMPERATURE: 98 F | BODY MASS INDEX: 28.85 KG/M2 | DIASTOLIC BLOOD PRESSURE: 55 MMHG | RESPIRATION RATE: 18 BRPM

## 2019-01-07 DIAGNOSIS — M54.12 CERVICAL RADICULOPATHY: Primary | ICD-10-CM

## 2019-01-07 DIAGNOSIS — M54.2 NECK PAIN: ICD-10-CM

## 2019-01-07 RX ORDER — PREGABALIN 75 MG/1
75 CAPSULE ORAL 2 TIMES DAILY
Qty: 28 CAP | Refills: 0 | Status: SHIPPED | OUTPATIENT
Start: 2019-01-07 | End: 2021-06-23

## 2019-01-07 RX ORDER — PREGABALIN 150 MG/1
150 CAPSULE ORAL 2 TIMES DAILY
Qty: 60 CAP | Refills: 5 | Status: SHIPPED | OUTPATIENT
Start: 2019-01-07 | End: 2021-06-23

## 2019-01-07 NOTE — PROGRESS NOTES
[address]  [address], [address]  [place], [address] [place] [address] Street  Phone: [phone]  Fax: [phone]        Giovani Mark  : 1960  PCP: Abbi Leiva MD  2019    PROGRESS NOTE      ASSESSMENT AND PLAN    Natasha Tijerina comes in to the office today for cervical MRI f/u. She continues to have left sided neck and shoulder pain radiating into her LUE. The MRI was very uncomfortable for her due to her LUE pain and paraesthesia. She has been experiencing somnolence with Gabapentin. She rates her pain as a 7/10 today. Her MRI revealed multilevel moderate DDD, particularly at C4-7. There is multilevel L>R foraminal stenosis, most significant and moderate to severe at the L C6-7, likely compressing the left C7 nerve root, and left C5-6. There is also a posterior moderate disc extrusion at T4-5 with moderate compression of the ventral cord and multilevel facet arthrosis of the upper thoracic levels. Her pain is likely due to a left C7 radiculopathy. We discussed the option of an EMG vs cervical interlaminar injections. I referred for cervical interlaminar injections. I also prescribed Lyrica 75mg BID for 2 weeks, then Lyrica 150mg BID moving forward. Depending on the results, we may consider further diagnostics vs surgical consult. Pt will f/u after cervical interlaminar injections or sooner as needed. Diagnoses and all orders for this visit:    1. Cervical radiculopathy  -     REFERRAL TO PAIN MANAGEMENT  -     pregabalin (LYRICA) 75 mg capsule; Take 1 Cap by mouth two (2) times a day. Max Daily Amount: 150 mg.  -     pregabalin (LYRICA) 150 mg capsule; Take 1 Cap by mouth two (2) times a day. Max Daily Amount: 300 mg.    2. Neck pain  -     REFERRAL TO PAIN MANAGEMENT      Follow-up Disposition: Not on File      HISTORY OF PRESENT ILLNESS  Natasha Tijerina is a 62 y.o. female.     A&P / HPI from 3/15/18:  Nathan Short in to the office today c/o left shoulder pain that radiates into her arm and neck x 1 month. She rates her pain as a 10/10 today.     She c/o paraesthesia in her right arm along a C7 distribution. Her cervical CT revealed multilevel degenerative findings, worse at C5-6 and C4-5, especially on the left side.     On examination, she had diffuse tenderness to palpation along her LUE and her left trapezius. She is neurologically intact. She had a positive Spurling's sign and positive Hawkin's test on the L Her pain is likely multifactorial with components of myofascial pain, cervical radiculopathy, and possibly a left rotator cuff injury.      I referred to physical therapy. I also referred to orthopedics for evaluation of her left shoulder pain.     I advised she begin her steroid pack that she received from the ER yesterday.     Pt will f/u in 6 weeks or sooner if needed.       HISTORY OF PRESENT ILLNESS  Venita Lofton a 62 y. o. female c/o left shoulder pain radiating into her arm and neck x 1 month.     She has been taking ibuprofen and other NSAIDs for relief.      She is a pharmacy tech.       Pt denies any fevers, chills, nausea, vomiting. Pt denies any chest pain and shortness of breath. Pt denies any ear, nose, and throat problems. Pt denies any fecal or urinary incontinence.      Updates from 12/04/18:  Pt presents for left-sided neck and shoulder pain with paraesthesia in her LUE. She fell in a grocery store while working and fractured her left shoulder. Since then, she has also c/o numbness and tingling in her LUE. She has had multiple rounds of PT, and she has been under the care of two orthopedics. She has been taking the max dosage of Tylenol and Ibuprofen without much relief. Updates from 01/07/19:  Pt presents for cervical MRI f/u. She continues to have left sided neck and shoulder pain radiating into her LUE that is progressively worsening.  The MRI was very uncomfortable for her due to her LUE pain and paraesthesia. She has been experiencing somnolence with Gabapentin, so she does not wish to increase it. PAST MEDICAL HISTORY   Past Medical History:   Diagnosis Date    Chronic pain     Diabetes ([facility] [address]     Epilepsy ([facility] [address]     HNP (herniated nucleus pulposus), lumbar     Hot flashes     Hypercholesterolemia     Hypothyroidism     Migraine     MRSA bacteremia     Multilevel degenerative disc disease     Patient reports no asthma    Radiculitis     Right lower extremity    Right leg pain     Seizures (HCC)     Weight loss        Past Surgical History:   Procedure Laterality Date    HX APPENDECTOMY      HX HYSTERECTOMY      HX PELVIC LAPAROSCOPY      HX TUBAL LIGATION     . MEDICATIONS    Current Outpatient Medications   Medication Sig Dispense Refill    gabapentin (NEURONTIN) 300 mg capsule Take 1 Cap by mouth three (3) times daily. 90 Cap 2    aspirin delayed-release 81 mg tablet Take  by mouth daily.  levothyroxine (SYNTHROID) 100 mcg tablet Take  by mouth Daily (before breakfast).  pravastatin (PRAVACHOL) 40 mg tablet Take 40 mg by mouth nightly.  multivitamin (ONE A DAY) tablet Take 1 Tab by mouth daily.  ibuprofen (MOTRIN) 600 mg tablet Take 1 Tab by mouth every six (6) hours as needed for Pain. 20 Tab 0    cholecalciferol, vitamin D3, (VITAMIN D3) 2,000 unit tab Take  by mouth.  levETIRAcetam (KEPPRA) 1,000 mg tablet Take 1 Tab by mouth two (2) times a day. (Patient taking differently: Take 2,000 mg by mouth two (2) times a day.) 60 Tab 0    cetirizine (ZYRTEC) 10 mg tablet Take  by mouth daily.  oxyCODONE-acetaminophen (PERCOCET) 5-325 mg per tablet Take 1 Tab by mouth every six (6) hours as needed for Pain. Max Daily Amount: 4 Tabs.  12 Tab 0        ALLERGIES  Allergies   Allergen Reactions    Azithromycin Seizures    Erythromycin Seizures     Reacts with Keppra    Tomato Rash          SOCIAL HISTORY    Social History     Socioeconomic History    Marital status:      Spouse name: Not on file    Number of children: Not on file    Years of education: Not on file    Highest education level: Not on file   Tobacco Use    Smoking status: Current Every Day Smoker     Packs/day: 0.25     Years: 36.00     Pack years: 9.00    Smokeless tobacco: Current User   Substance and Sexual Activity    Alcohol use: Yes     Comment: occasional       FAMILY HISTORY  No family history on file. REVIEW OF SYSTEMS  Review of Systems   Musculoskeletal: Positive for neck pain. L shoulder pain  LUE paraesthesia         PHYSICAL EXAMINATION  Visit Vitals  /55   Pulse 84   Temp 98 °F (36.7 °C)   Resp 18   Ht 5' 4\" (1.626 m)   Wt 169 lb (76.7 kg)   SpO2 96%   BMI 29.01 kg/m²       Pain Assessment  11/13/2018   Location of Pain Arm; Shoulder   Location Modifiers Left   Severity of Pain 10   Quality of Pain Throbbing; Sharp;Dull;Aching   Duration of Pain Persistent   Frequency of Pain Constant   Date Pain First Started -   Aggravating Factors Other (Comment)   Aggravating Factors Comment -   Limiting Behavior Yes   Relieving Factors Nothing   Relieving Factors Comment -   Result of Injury Yes   Work-Related Injury Yes   Type of Injury Fall           Constitutional:  Well developed, well nourished, in no acute distress. Psychiatric: Affect and mood are appropriate. Integumentary: No rashes or abrasions noted on exposed areas. SPINE/MUSCULOSKELETAL EXAM    Cervical spine:  Neck is midline. Normal muscle tone. No focal atrophy is noted. ROM painful on L. Shoulder ROM intact.   Tenderness to palpation of left trapezius and cervical paraspinals. Positive Spurling's sign on L. Negative Tinel's sign. Negative Javier's sign.      Sensation in the bilateral arms grossly intact to light touch.      Lumbar spine:  No rash, ecchymosis, or gross obliquity. No fasciculations. No focal atrophy is noted. No pain with hip ROM.    Full range of motion. No tenderness to palpation. No tenderness to palpation at the sciatic notch. SI joints non-tender. Trochanters non tender.     Sensation in the bilateral legs grossly intact to light touch.     Left upper extremity  Diffuse tenderness to palpation  Painful with internal rotation of left shoulder  Positive Hawkin's sign on L     MOTOR:      Biceps  Triceps Deltoids Wrist Ext Wrist Flex Hand Intrin   Right 5/5 5/5 5/5 5/5 5/5 5/5   Left 5/5 5/5 5/5 5/5 5/5 5/5             Hip Flex  Quads Hamstrings Ankle DF EHL Ankle PF   Right 5/5 5/5 5/5 5/5 5/5 5/5   Left 5/5 5/5 5/5 5/5 5/5 5/5     DTRs are 2+ biceps, triceps, brachioradialis, patella, and Achilles.     Negative Straight Leg raise. Squat not tested. No difficulty with tandem gait.      Ambulation without assistive device. FWB.       RADIOGRAPHS  Cervical MRI images taken on 12/13/18 personally reviewed with patient:  HISTORY: neck pain radiating into LUE, decreased sensation and possible weakness  in C5/6 distribution; PT has had multiple rounds of PT     COMPARISON: CT cervical spine 3/14/2018     TECHNIQUE: Multisequence multiplanar imaging through the cervical spine.     FINDINGS:     Alignment: Straightening of cervical lordosis without subluxation. Vertebral body height: Normal  Marrow signal: Unremarkable  Disc spaces: Multilevel disc height loss with endplate osteophytosis  particularly C4-C7     Cervicomedullary Junction: Patent  Cervical cord: Normal signal and morphology Further discussed below where  relevant.     On axial imaging, findings at each level are as follows:     C2/C3: Left paracentral disc osteophyte partially effacing ventral CSF space. Otherwise patent central canal. Mild left facet hypertrophy. Mild left  uncovertebral spurring and left foraminal stenosis.     C3/C4: Mild posterior disc protrusion partially effacing ventral CSF space. AP  canal diameter 9 mm.  Patent neural foramina.     C4/C5: Moderate disc height loss and disc osteophyte complex effacing ventral  CSF space and flattening ventral cord. AP canal diameter 8 mm. Uncovertebral  spurring. Mild to moderate foraminal stenosis.     C5/C6: Moderate disc height loss and disc osteophyte complex partially effacing  ventral CSF space. AP canal diameter 9-10 mm. Moderate to severe left and mild  right foraminal stenosis.     C6/C7: Moderate disc height loss and disc osteophyte complex partially effacing  ventral CSF space. AP canal diameter 9-10 mm. Moderate to severe left foramina  stenosis from what appears to be disc protrusion, most likely impinging the left  C7 nerve root (gradient series 6 image 11). No right foramina stenosis.     C7/T1: No significant disc pathology. Patent canal and foramina.     Other structures: Multilevel facet arthrosis in the upper thoracic levels. T4-5  posterior disc extrusion (6 x 8 mm on sagittal planes) with moderate compression  of the ventral cord.     IMPRESSION  IMPRESSION:     1. Multilevel moderate degenerative disc disease particularly C4-C7 with  multilevel mild central canal stenosis. - Multilevel left greater than right foraminal stenosis most significant  moderate to severe stenosis at left C6-7 due to disc protrusion with likely  impingement on the left C7 nerve root, and left C5-6 neural foramen due  uncovertebral spurring. 2. T4-5 posterior moderate disc extrusion with moderate compression of the  ventral cord. Multilevel facet arthrosis in the upper thoracic levels. CT Cervical spine images taken on 3/15/18 personally reviewed with patient:  FINDINGS:     No evidence of acute fracture or subluxation is detected.  Uncovertebral  osteophyte development is noted at C3-4, C4-5, C5-6 levels, more pronounced on  the left side in the right, most pronounced at C5-6 followed by C4-5.  Related  to the presence of these uncovertebral osteophytes, there is evidence of  foraminal narrowing on the left side at C4-5 and C5-6.  Endplate osteophytes are  observed at C3-4, C4-5 and C5-6 levels, with most pronounced endplate  osteophytes at C5-6 followed by C4-5.  There is posterior disc-osteophyte  complex at these levels as well which may produce slight central canal  narrowing.  Mild facet hypertrophy.     Visualized portions of the lung apices are clear.  No significant prevertebral  soft tissue edema is observed.     IMPRESSION  IMPRESSION:     1.  No acute fracture or subluxation. 2.  Degenerative changes of the cervical spine involving the C3-4, C4-5 and C5-6  levels, most pronounced at C5-6 followed by C4-5 especially on the left side. Neural foraminal narrowing on the left side.     CT Cervical spine images taken on 3/15/18 personally reviewed with patient:  FINDINGS:      Three views have been obtained. There is no evidence of fracture or dislocation.   The joint spaces are maintained.  Mineralization is normal.  There is no  radiopaque foreign body.     IMPRESSION  IMPRESSION:     Negative. 21 minutes of face-to-face contact were spent with the patient during today's visit extensively discussing symptoms and treatment plan. All questions were answered. More than half of this visit today was spent on counseling.      Written by Carlos Casanova as dictated by Amy Samayoa MD

## 2019-02-11 ENCOUNTER — OFFICE VISIT (OUTPATIENT)
Dept: PAIN MANAGEMENT | Age: 59
End: 2019-02-11

## 2019-02-11 VITALS
OXYGEN SATURATION: 96 % | SYSTOLIC BLOOD PRESSURE: 132 MMHG | HEIGHT: 64 IN | BODY MASS INDEX: 28.85 KG/M2 | WEIGHT: 169 LBS | DIASTOLIC BLOOD PRESSURE: 76 MMHG | HEART RATE: 87 BPM | TEMPERATURE: 96.8 F | RESPIRATION RATE: 20 BRPM

## 2019-02-11 DIAGNOSIS — M48.02 CERVICAL STENOSIS OF SPINAL CANAL: Primary | ICD-10-CM

## 2019-02-11 RX ORDER — FLUTICASONE FUROATE AND VILANTEROL 100; 25 UG/1; UG/1
1 POWDER RESPIRATORY (INHALATION) DAILY
COMMUNITY

## 2019-02-11 RX ORDER — GLIPIZIDE 5 MG/1
5 TABLET, FILM COATED, EXTENDED RELEASE ORAL DAILY
COMMUNITY
End: 2021-01-19

## 2019-02-11 NOTE — PROGRESS NOTES
PEÑA -56% HPI: 
Sarah Bernstein is a 62 y.o. female here for initial evaluation referred from Dr. Yamile Rehman for consideration of cervical epidural steroid injection. She reports an acute onset of pain beginning in April 2018 following a fall from height. She reports neck and arm and shoulder pain since that day. The pain is located at the base of the neck toward the left side with left upper extremity weakness and decreased shoulder active range of motion. The pain is described as burning and stabbing from the cervicothoracic junction across the shoulder to the elbow and wrist region and in the hand she also has some numbness with pins and needles. The left hand dysesthesias alternate between median and ulnar distributions and sometimes include both distributions. She denies any bowel or bladder incontinence or any other focal weakness anywhere. Her symptoms are worsened when she does dishes, shopping, lying supine, or with cervical rotation to the left. Her symptoms improve with sleeping in a recliner, standing with a neutral neck, ibuprofen, Tylenol, and heat. She has not had any electrodiagnostic investigation done. Grant Ortega ROS:Review of systems is negative for fever, chills, nausea, vomiting, diarrhea, constipation, chest pain, shortness of breath, abdominal pain, weakness, trouble swallowing, acute changes in vision, acute changes in hearing, falls, dizziness, bladder incontinence, bowel incontinence, depression, anxiety, suicidal ideation, homicidal ideation, alcohol use. Review of systems positive for neck and left arm pain Imaging: Report from MRI of cervical spine from December 13, 2018 showed,\"\"\"\"\"\"\"\"\"\"\"\"\"\"\"\"FINDINGS: 
  
Alignment: Straightening of cervical lordosis without subluxation. Vertebral body height: Normal 
Marrow signal: Unremarkable Disc spaces: Multilevel disc height loss with endplate osteophytosis 
particularly C4-C7 
  
Cervicomedullary Junction: Patent Cervical cord: Normal signal and morphology Further discussed below where 
relevant. 
  
On axial imaging, findings at each level are as follows: 
  
C2/C3: Left paracentral disc osteophyte partially effacing ventral CSF space. Otherwise patent central canal. Mild left facet hypertrophy. Mild left 
uncovertebral spurring and left foraminal stenosis. 
  
C3/C4: Mild posterior disc protrusion partially effacing ventral CSF space. AP 
canal diameter 9 mm. Patent neural foramina. 
  
C4/C5: Moderate disc height loss and disc osteophyte complex effacing ventral 
CSF space and flattening ventral cord. AP canal diameter 8 mm. Uncovertebral 
spurring. Mild to moderate foraminal stenosis. 
  
C5/C6: Moderate disc height loss and disc osteophyte complex partially effacing 
ventral CSF space. AP canal diameter 9-10 mm. Moderate to severe left and mild 
right foraminal stenosis. 
  
C6/C7: Moderate disc height loss and disc osteophyte complex partially effacing 
ventral CSF space. AP canal diameter 9-10 mm. Moderate to severe left foramina 
stenosis from what appears to be disc protrusion, most likely impinging the left C7 nerve root (gradient series 6 image 11). No right foramina stenosis. 
  
C7/T1: No significant disc pathology. Patent canal and foramina. 
  
Other structures: Multilevel facet arthrosis in the upper thoracic levels. T4-5 
posterior disc extrusion (6 x 8 mm on sagittal planes) with moderate compression 
of the ventral cord. 
  
IMPRESSION IMPRESSION: 
  
1. Multilevel moderate degenerative disc disease particularly C4-C7 with 
multilevel mild central canal stenosis. - Multilevel left greater than right foraminal stenosis most significant 
moderate to severe stenosis at left C6-7 due to disc protrusion with likely 
impingement on the left C7 nerve root, and left C5-6 neural foramen due 
uncovertebral spurring.  
2. T4-5 posterior moderate disc extrusion with moderate compression of the 
 ventral cord. Multilevel facet arthrosis in the upper thoracic levels. \"\"\"\"\"\"\"\"\"\"\"\"\"\"\"\"\"\"\"\"' Vitals:  
 02/11/19 0950 BP: 132/76 Pulse: 87 Resp: 20 Temp: 96.8 °F (36 °C) TempSrc: Oral  
SpO2: 96% Weight: 76.7 kg (169 lb) Height: 5' 4\" (1.626 m) PainSc:   9  
  
 
PE: 
AFVSS, no acute distress, normal body habitus. A&OXs 3. 
normocephalic, atraumatic. Conjugate gaze, clear sclerae. Speech is clear and appropriate. Mood is pleasant and appropriate. Patient is cooperative. Strength for right upper extremity is 5/5 for shoulder abduction, elbow flexion, wrist extension, elbow extension, finger abduction, flexor digitorum profundus to digits 2 through 5. Strength for left upper extremity is 5/5 for shoulder abduction, elbow flexion, wrist extension, elbow extension, finger abduction, 4/5 flexor digitorum profundus to digits 2 through 5. Positive Tinel's at left wrist and left elbow. Decreased active range of motion for cervical flexion, extension, rotation right and left. Significant reproduction of cervicothoracic junction pain with active cervical extension. Cervical rotation to the left is reduced by at least 50% with reproduction of radiating symptoms into the left arm to the elbow. Muscle stretch reflexes for right upper extremity are 2+ for C5, C6, C7. Muscle stretch reflexes for left upper extremity are 1+ for C5, C6, C7. Negative Hoffmans on the right and the left. No hand atrophy was noted bilaterally. Gait is within functional limits. Balance is within functional limits. Sensation to light touch is intact for right C2-T1 and decreased left C2 -T1. Primary Care Physician Abril Shanks [address] [facility] [place] [address] 
[phone] PHQ -- . PHQ over the last two weeks 2/11/2019 PHQ Not Done - Little interest or pleasure in doing things Not at all Feeling down, depressed, irritable, or hopeless Not at all Total Score PHQ 2 0 Assessment/Plan: ICD-10-CM ICD-9-CM 1. Cervical stenosis of spinal canal M48.02 723.0   
  
 
--Defer cervical epidural steroid injection for now. Patient may call as needed following additional suggested workup for consideration of cervical epidural steroid injection at the T1-T2 level where there was noted to be adequate epidural fat. Avoid cervical epidural at the C7/T1 level. If epidural steroid is considered use extreme caution secondary to significant multiple level canal stenosis. --Recommend additional workup possibly with electrodiagnostic studies to rule out a double crush syndrome involving left median and left ulnar nerves and possibly brachial plexus. --Patient to return as needed. GOALS: 
To establish complementary and integrative plan of care to address chronic pain issues while minimizing pharmaceuticals to maximize patient's function improve quality of life. A total of 30 minutes were spent with the patient, of which more than half of the time was spent counseling and/or coordinating care. F/u:. Follow-up Disposition: Not on File Social History Socioeconomic History  Marital status:  Spouse name: Not on file  Number of children: Not on file  Years of education: Not on file  Highest education level: Not on file Social Needs  Financial resource strain: Not on file  Food insecurity - worry: Not on file  Food insecurity - inability: Not on file  Transportation needs - medical: Not on file  Transportation needs - non-medical: Not on file Occupational History  Not on file Tobacco Use  Smoking status: Current Every Day Smoker Packs/day: 0.25 Years: 36.00 Pack years: 9.00  Smokeless tobacco: Current User Substance and Sexual Activity  Alcohol use: Yes Comment: occasional  
 Drug use: Not on file  Sexual activity: Not on file Comment: Hysterectomy Other Topics Concern  Not on file Social History Narrative  Not on file History reviewed. No pertinent family history. Allergies Allergen Reactions  Azithromycin Seizures  Erythromycin Seizures Reacts with Keppra  Tomato Rash Past Medical History:  
Diagnosis Date  Chronic pain  Diabetes ([address]  Epilepsy ([facility] [address]  HNP (herniated nucleus pulposus), lumbar  Hot flashes  Hypercholesterolemia  Hypothyroidism  Migraine  MRSA bacteremia  Multilevel degenerative disc disease Patient reports no asthma  Radiculitis Right lower extremity  Right leg pain  Seizures ([facility] [address]  Weight loss Past Surgical History:  
Procedure Laterality Date  HX APPENDECTOMY  HX HYSTERECTOMY  HX PELVIC LAPAROSCOPY    
 HX TUBAL LIGATION Current Outpatient Medications on File Prior to Visit Medication Sig  
 glipiZIDE SR (GLUCOTROL XL) 5 mg CR tablet Take 5 mg by mouth daily.  fluticasone-vilanterol (BREO ELLIPTA) 100-25 mcg/dose inhaler Take 1 Puff by inhalation daily.  pregabalin (LYRICA) 75 mg capsule Take 1 Cap by mouth two (2) times a day. Max Daily Amount: 150 mg.  
 aspirin delayed-release 81 mg tablet Take 81 mg by mouth daily.  levothyroxine (SYNTHROID) 100 mcg tablet Take 100 mcg by mouth Daily (before breakfast).  pravastatin (PRAVACHOL) 40 mg tablet Take 40 mg by mouth nightly.  multivitamin (ONE A DAY) tablet Take 1 Tab by mouth daily.  ibuprofen (MOTRIN) 600 mg tablet Take 1 Tab by mouth every six (6) hours as needed for Pain.  cholecalciferol, vitamin D3, (VITAMIN D3) 2,000 unit tab Take 5,000 Units by mouth daily.  levETIRAcetam (KEPPRA) 1,000 mg tablet Take 1 Tab by mouth two (2) times a day. (Patient taking differently: Take 2,000 mg by mouth daily.)  cetirizine (ZYRTEC) 10 mg tablet Take 10 mg by mouth daily as needed.   
 pregabalin (LYRICA) 150 mg capsule Take 1 Cap by mouth two (2) times a day. Max Daily Amount: 300 mg.  
 oxyCODONE-acetaminophen (PERCOCET) 5-325 mg per tablet Take 1 Tab by mouth every six (6) hours as needed for Pain. Max Daily Amount: 4 Tabs. No current facility-administered medications on file prior to visit.

## 2019-02-11 NOTE — PROGRESS NOTES
Nursing Notes Patient presents to the office today for a consult with Dr. Roberta Shaffer for injections options for her chronic neck pain. Patient rates her pain at 9/10 on the numerical pain scale. Comments: POC UDS was not performed in office today Any new labs or imaging since last appointment? YES. Pt had an MRI done of her cervical spine in December 2018. Results in [facility]. Have you been to an emergency room (ER) or urgent care clinic since your last visit? NO Have you been hospitalized since your last visit? NO If yes, where, when, and reason for visit? Have you seen or consulted any other health care providers outside of the [address] [place] [address]  since your last visit? YES If yes, where, when, and reason for visit? orthopedic MsYuliana Middleton has a reminder for a \"due or due soon\" health maintenance. I have asked that she contact her primary care provider for follow-up on this health maintenance. Abuse Screening Questionnaire 2/11/2019 Do you ever feel afraid of your partner? Tana Mcdaniels Are you in a relationship with someone who physically or mentally threatens you? Tana Mcdaniels Is it safe for you to go home? [place] Fall Risk Assessment, last 12 mths 2/11/2019 Able to walk? Yes Fall in past 12 months? Yes Fall with injury? Yes  
Number of falls in past 12 months 1 Fall Risk Score 2 PHQ over the last two weeks 2/11/2019 PHQ Not Done - Little interest or pleasure in doing things Not at all Feeling down, depressed, irritable, or hopeless Not at all Total Score PHQ 2 0 The pt does not have an advanced medical directive, POA, or living will. She is not interested in discussing this today.

## 2019-07-17 NOTE — TELEPHONE ENCOUNTER
Caller is Jennie at [facility].    Caller states the 2 prescriptions for the patient did not work, patient is still limited to 60 tablets w/o prior authorization for the qty. 90 oxycontIn.    Call [phone].   Left message for patient RE: appt with Dr. Wendy Aviles for 03-20-18 to arrive @ 9:15am @  office for right shoulder eval.

## 2019-07-22 ENCOUNTER — HOSPITAL ENCOUNTER (OUTPATIENT)
Dept: LAB | Age: 59
Discharge: HOME | End: 2019-07-22

## 2019-07-22 LAB — SENTARA SPECIMEN COL,SENBCF: NORMAL

## 2019-07-22 PROCEDURE — 99001 SPECIMEN HANDLING PT-LAB: CPT

## 2020-06-09 ENCOUNTER — HOSPITAL ENCOUNTER (OUTPATIENT)
Dept: MAMMOGRAPHY | Age: 60
Discharge: HOME | End: 2020-06-09
Attending: FAMILY MEDICINE
Payer: MEDICAID

## 2020-06-09 DIAGNOSIS — Z12.31 ENCOUNTER FOR SCREENING MAMMOGRAM FOR BREAST CANCER: ICD-10-CM

## 2020-06-09 PROCEDURE — 77067 SCR MAMMO BI INCL CAD: CPT

## 2020-10-21 ENCOUNTER — HOSPITAL ENCOUNTER (EMERGENCY)
Age: 60
Discharge: HOME | End: 2020-10-21
Attending: EMERGENCY MEDICINE
Payer: MEDICAID

## 2020-10-21 ENCOUNTER — APPOINTMENT (OUTPATIENT)
Dept: VASCULAR SURGERY | Age: 60
End: 2020-10-21
Attending: NURSE PRACTITIONER
Payer: MEDICAID

## 2020-10-21 ENCOUNTER — APPOINTMENT (OUTPATIENT)
Dept: GENERAL RADIOLOGY | Age: 60
End: 2020-10-21
Attending: NURSE PRACTITIONER
Payer: MEDICAID

## 2020-10-21 VITALS
SYSTOLIC BLOOD PRESSURE: 138 MMHG | DIASTOLIC BLOOD PRESSURE: 68 MMHG | TEMPERATURE: 98.2 F | HEART RATE: 81 BPM | RESPIRATION RATE: 18 BRPM

## 2020-10-21 DIAGNOSIS — I80.02 SAPHENOUS VEIN THROMBOPHLEBITIS, LEFT: ICD-10-CM

## 2020-10-21 DIAGNOSIS — M71.22 SYNOVIAL CYST OF LEFT POPLITEAL SPACE: ICD-10-CM

## 2020-10-21 DIAGNOSIS — M17.12 OSTEOARTHRITIS OF LEFT KNEE, UNSPECIFIED OSTEOARTHRITIS TYPE: Primary | ICD-10-CM

## 2020-10-21 PROCEDURE — 73564 X-RAY EXAM KNEE 4 OR MORE: CPT

## 2020-10-21 PROCEDURE — 99282 EMERGENCY DEPT VISIT SF MDM: CPT

## 2020-10-21 PROCEDURE — 74011250637 HC RX REV CODE- 250/637: Performed by: NURSE PRACTITIONER

## 2020-10-21 PROCEDURE — 93971 EXTREMITY STUDY: CPT

## 2020-10-21 RX ORDER — NAPROXEN 500 MG/1
500 TABLET ORAL 2 TIMES DAILY WITH MEALS
Qty: 20 TAB | Refills: 0 | Status: SHIPPED | OUTPATIENT
Start: 2020-10-21 | End: 2020-10-31

## 2020-10-21 RX ORDER — HYDROCODONE BITARTRATE AND ACETAMINOPHEN 5; 325 MG/1; MG/1
1 TABLET ORAL
Status: COMPLETED | OUTPATIENT
Start: 2020-10-21 | End: 2020-10-21

## 2020-10-21 RX ADMIN — HYDROCODONE BITARTRATE AND ACETAMINOPHEN 1 TABLET: 5; 325 TABLET ORAL at 16:55

## 2020-10-21 NOTE — DISCHARGE INSTRUCTIONS
Patient Education        León's Cyst: Care Instructions  Your Care Instructions     A Baker's cyst is a swelling behind the knee. It may cause pain or stiffness when you bend your knee or straighten it all the way. Baker's cysts are also called popliteal cysts. If you have arthritis or another condition that is the cause of the Baker's cyst, your doctor may treat that condition. A Baker's cyst may go away on its own. If not, or if it is causing a lot of discomfort, your doctor may drain the fluid that has built up behind the knee. In some cases, a Baker's cyst is removed in surgery. There are things you can do at home, such as staying off your leg, to reduce the swelling and pain. Follow-up care is a key part of your treatment and safety. Be sure to make and go to all appointments, and call your doctor if you are having problems. It's also a good idea to know your test results and keep a list of the medicines you take. How can you care for yourself at home? · Rest your knee as much as possible. · Ask your doctor if you can take an over-the-counter pain medicine, such as acetaminophen (Tylenol), ibuprofen (Advil, Motrin), or naproxen (Aleve). Be safe with medicines. Read and follow all instructions on the label. · Use a cane, a crutch, a walker, or another device if you need help to get around. These can help rest your knees. · If you have an elastic bandage, make sure it is snug but not so tight that your leg is numb, tingles, or swells below the bandage. Ask your doctor if you can loosen the bandage if it is too tight. · Follow your doctor's instructions about how much weight you can put on your knee. · Stay at a healthy weight. Being overweight puts extra strain on your knee. When should you call for help? Call 911 anytime you think you may need emergency care. For example, call if:    · You have chest pain, are short of breath, or you cough up blood.    Call your doctor now or seek immediate medical care if:    · You have new or worse pain.     · Your foot is cool or pale or changes color.     · You have tingling, weakness, or numbness in your foot or toes.     · You have signs of a blood clot in your leg (called a deep vein thrombosis), such as:  ? Pain in your calf, back of the knee, thigh, or groin. ? Redness or swelling in your leg. Watch closely for changes in your health, and be sure to contact your doctor if:    · You do not get better as expected. Where can you learn more? Go to http://www.gray.com/  Enter T303 in the search box to learn more about \"Baker's Cyst: Care Instructions. \"  Current as of: March 2, 2020               Content Version: 12.6  © 9545-0114 [employer]. Care instructions adapted under license by [employer] (which disclaims liability or warranty for this information). If you have questions about a medical condition or this instruction, always ask your healthcare professional. [place] [address] any warranty or liability for your use of this information. Patient Education        Knee Arthritis: Care Instructions  Your Care Instructions     Knee arthritis is a breakdown of the cartilage that cushions your knee joint. When the cartilage wears down, your bones rub against each other. This causes pain and stiffness. Knee arthritis tends to get worse with time. Treatment for knee arthritis involves reducing pain, making the leg muscles stronger, and staying at a healthy body weight. The treatment usually does not improve the health of the cartilage, but it can reduce pain and improve how well your knee works. You can take simple measures to protect your knee joints, ease your pain, and help you stay active. Follow-up care is a key part of your treatment and safety. Be sure to make and go to all appointments, and call your doctor if you are having problems.  It's also a good idea to know your test results and keep a list of the medicines you take. How can you care for yourself at home? · Know that knee arthritis will cause more pain on some days than on others. · Stay at a healthy weight. Lose weight if you are overweight. When you stand up, the pressure on your knees from every pound of body weight is multiplied four times. So if you lose 10 pounds, you will reduce the pressure on your knees by 40 pounds. · Talk to your doctor or physical therapist about exercises that will help ease joint pain. ? Stretch to help prevent stiffness and to prevent injury before you exercise. You may enjoy gentle forms of yoga to help keep your knee joints and muscles flexible. ? Walk instead of jog.  ? Ride a bike. This makes your thigh muscles stronger and takes pressure off your knee. ? Wear well-fitting and comfortable shoes. ? Exercise in chest-deep water. This can help you exercise longer with less pain. ? Avoid exercises that include squatting or kneeling. They can put a lot of strain on your knees. ? Talk to your doctor to make sure that the exercise you do is not making the arthritis worse. · Do not sit for long periods of time. Try to walk once in a while to keep your knee from getting stiff. · Ask your doctor or physical therapist whether shoe inserts may reduce your arthritis pain. · If you can afford it, get new athletic shoes at least every year. This can help reduce the strain on your knees. · Use a device to help you do everyday activities. ? A cane or walking stick can help you keep your balance when you walk. Hold the cane or walking stick in the hand opposite the painful knee. ? If you feel like you may fall when you walk, try using crutches or a front-wheeled walker. These can prevent falls that could cause more damage to your knee. ? A knee brace may help keep your knee stable and prevent pain.   ? You also can use other things to make life easier, such as a higher toilet seat and handrails in the bathtub or shower. · Take pain medicines exactly as directed. ? Do not wait until you are in severe pain. You will get better results if you take it sooner. ? If you are not taking a prescription pain medicine, take an over-the-counter medicine such as acetaminophen (Tylenol), ibuprofen (Advil, Motrin), or naproxen (Aleve). Read and follow all instructions on the label. ? Do not take two or more pain medicines at the same time unless the doctor told you to. Many pain medicines have acetaminophen, which is Tylenol. Too much acetaminophen (Tylenol) can be harmful. ? Tell your doctor if you take a blood thinner, have diabetes, or have allergies to shellfish. · Ask your doctor if you might benefit from a shot of steroid medicine into your knee. This may provide pain relief for several months. · Many people take the supplements glucosamine and chondroitin for osteoarthritis. Some people feel they help, but the medical research does not show that they work. Talk to your doctor before you take these supplements. When should you call for help? Call your doctor now or seek immediate medical care if:    · You have sudden swelling, warmth, or pain in your knee.     · You have knee pain and a fever or rash.     · You have such bad pain that you cannot use your knee. Watch closely for changes in your health, and be sure to contact your doctor if you have any problems. Where can you learn more? Go to http://www.gray.com/  Enter W187 in the search box to learn more about \"Knee Arthritis: Care Instructions. \"  Current as of: December 9, 2019               Content Version: 12.6  © 1215-0690 [employer]. Care instructions adapted under license by [employer] (which disclaims liability or warranty for this information).  If you have questions about a medical condition or this instruction, always ask your healthcare professional. [address] any warranty or liability for your use of this information. Patient Education        Superficial Thrombophlebitis: Care Instructions  Your Care Instructions  Superficial thrombophlebitis is inflammation in a vein where a blood clot has formed close to the surface of the skin. You may be able to feel the clot as a firm lump under the skin. The skin over the clot can become red, tender, and warm to the touch. Blood clots in veins close to the skin's surface usually are not serious and often can be treated at home. Sometimes superficial thrombophlebitis spreads to a deeper vein (deep vein thrombosis, or DVT). These deeper clots can be serious, even life-threatening. It is very important that you follow your doctor's instructions, keep all follow-up appointments, and watch for new or worsening symptoms of a clot. Follow-up care is a key part of your treatment and safety. Be sure to make and go to all appointments, and call your doctor if you are having problems. It's also a good idea to know your test results and keep a list of the medicines you take. How can you care for yourself at home? · Take your medicines exactly as prescribed. Call your doctor if you think you are having a problem with your medicine. You will get more details on the specific medicines your doctor prescribes. · Prop up the sore leg or arm on a pillow anytime you sit or lie down. Try to keep it above the level of your heart. To prevent thrombophlebitis  · Exercise. Keep blood moving in your legs to keep new clots from forming. · Get up out of bed as soon as possible after an illness or surgery. · Do not smoke. If you need help quitting, talk to your doctor about stop-smoking programs and medicines. These can increase your chances of quitting for good. · Ask your doctor about compression stockings. These may help prevent blood clots from forming in your legs. But there are different types of stockings, and they need to fit right.  So your doctor will recommend what you need. When should you call for help? Call 911 anytime you think you may need emergency care. For example, call if:    · You have sudden chest pain and shortness of breath, or you cough up blood.     · You vomit blood or what looks like coffee grounds.     · You pass maroon or very bloody stools.     · You passed out (lost consciousness). Call your doctor now or seek immediate medical care if:    · You have signs of a blood clot, such as:  ? Pain in your calf, back of the knee, thigh, or groin. ? Redness and swelling in your leg or groin.     · You notice a new hard, red, or tender area in your leg.     · Your stools are black and tarlike or have streaks of blood. Watch closely for changes in your health, and be sure to contact your doctor if:    · You do not get better as expected. Where can you learn more? Go to http://www.gray.com/  Enter F241 in the search box to learn more about \"Superficial Thrombophlebitis: Care Instructions. \"  Current as of: March 4, 2020               Content Version: 12.6  © 7572-7198 [employer]. Care instructions adapted under license by [employer] (which disclaims liability or warranty for this information). If you have questions about a medical condition or this instruction, always ask your healthcare professional. [place] [address] any warranty or liability for your use of this information.

## 2020-10-21 NOTE — ED NOTES
PA reviewed discharge and prescription instructions with the patient. The patient verbalized understanding. Denies pain, remains alert and ambulatory. No acute distress noted. Needs met.

## 2020-10-21 NOTE — ED PROVIDER NOTES
EMERGENCY DEPARTMENT HISTORY AND PHYSICAL EXAM    3:34 PM      Date: 10/21/2020  Patient Name: Fernando Araiza    History of Presenting Illness     Chief Complaint   Patient presents with    Knee Pain         History Provided By: Patient    Additional History (Context): Fernando Araiza is a 61 y.o. female with past medical history of chronic pain, diabetes, hypothyroidism, migraines, hypercholesterolemia, and seizures who presents with complaints of left knee pain and swelling with extension into the left posterior calf over the last week with gradual worsening. Patient denies any injury or trauma and states the pain started in the back of the knee and now extends down to the proximal half of the calf. She denies any redness, warmth, but does report subjective swelling. She has tried Tylenol and ibuprofen without improvement. She denies any exogenous hormone use/birth control, recent immobilization or surgery, trauma, hemoptysis, shortness of breath, or history of DVT. PCP: Cameron Patricia MD    Current Outpatient Medications   Medication Sig Dispense Refill    naproxen (Naprosyn) 500 mg tablet Take 1 Tab by mouth two (2) times daily (with meals) for 10 days. 20 Tab 0    glipiZIDE SR (GLUCOTROL XL) 5 mg CR tablet Take 5 mg by mouth daily.  fluticasone-vilanterol (BREO ELLIPTA) 100-25 mcg/dose inhaler Take 1 Puff by inhalation daily.  pregabalin (LYRICA) 75 mg capsule Take 1 Cap by mouth two (2) times a day. Max Daily Amount: 150 mg. 28 Cap 0    pregabalin (LYRICA) 150 mg capsule Take 1 Cap by mouth two (2) times a day. Max Daily Amount: 300 mg. 60 Cap 5    aspirin delayed-release 81 mg tablet Take 81 mg by mouth daily.  levothyroxine (SYNTHROID) 100 mcg tablet Take 100 mcg by mouth Daily (before breakfast).  pravastatin (PRAVACHOL) 40 mg tablet Take 40 mg by mouth nightly.  multivitamin (ONE A DAY) tablet Take 1 Tab by mouth daily.       oxyCODONE-acetaminophen (PERCOCET) 5-325 mg per tablet Take 1 Tab by mouth every six (6) hours as needed for Pain. Max Daily Amount: 4 Tabs. 12 Tab 0    ibuprofen (MOTRIN) 600 mg tablet Take 1 Tab by mouth every six (6) hours as needed for Pain. 20 Tab 0    cholecalciferol, vitamin D3, (VITAMIN D3) 2,000 unit tab Take 5,000 Units by mouth daily.  levETIRAcetam (KEPPRA) 1,000 mg tablet Take 1 Tab by mouth two (2) times a day. (Patient taking differently: Take 2,000 mg by mouth daily.) 60 Tab 0    cetirizine (ZYRTEC) 10 mg tablet Take 10 mg by mouth daily as needed. Past History     Past Medical History:  Past Medical History:   Diagnosis Date    Chronic pain     Diabetes ([address]     Epile[facility] [address] [address]     HNP (herniated nucleus pulposus), lumbar     Hot flashes     Hypercholesterolemia     Hypothyroidism     Migraine     MRSA bacteremia     Multilevel degenerative disc disease     Patient reports no asthma    Radiculitis     Right lower extremity    Right leg pain     Seizures (HCC)     Weight loss        Past Surgical History:  Past Surgical History:   Procedure Laterality Date    HX APPENDECTOMY      HX HYSTERECTOMY      HX PELVIC LAPAROSCOPY      HX TUBAL LIGATION         Family History:  History reviewed. No pertinent family history. Social History:  Social History     Tobacco Use    Smoking status: Current Every Day Smoker     Packs/day: 0.25     Years: 36.00     Pack years: 9.00    Smokeless tobacco: Current User   Substance Use Topics    Alcohol use: Yes     Comment: occasional    Drug use: Not on file       Allergies: Allergies   Allergen Reactions    Azithromycin Seizures    Erythromycin Seizures     Reacts with Keppra    Tomato Rash         Review of Systems       Review of Systems   Constitutional: Negative. Negative for chills and fever. HENT: Negative. Negative for congestion, ear pain and rhinorrhea. Eyes: Negative. Negative for pain and redness. Respiratory: Negative.   Negative for cough and shortness of breath. Cardiovascular: Positive for leg swelling. Negative for chest pain and palpitations. Gastrointestinal: Negative. Negative for abdominal pain, constipation, diarrhea, nausea and vomiting. Genitourinary: Negative. Negative for dysuria, frequency, hematuria and urgency. Musculoskeletal: Positive for arthralgias (Left knee). Negative for back pain, gait problem, joint swelling and neck pain. Skin: Negative. Negative for rash and wound. Neurological: Negative. Negative for dizziness, seizures, speech difficulty, weakness, light-headedness and headaches. Hematological: Negative for adenopathy. Does not bruise/bleed easily. All other systems reviewed and are negative. Physical Exam     Visit Vitals  /68   Pulse 81   Temp 98.2 °F (36.8 °C)   Resp 18         Physical Exam  Vitals signs and nursing note reviewed. Constitutional:       General: She is not in acute distress. Appearance: Normal appearance. She is not ill-appearing, toxic-appearing or diaphoretic. HENT:      Head: Normocephalic and atraumatic. Nose: Nose normal.   Eyes:      General: Lids are normal. Vision grossly intact. Conjunctiva/sclera: Conjunctivae normal.      Pupils: Pupils are equal, round, and reactive to light. Neck:      Musculoskeletal: Full passive range of motion without pain and normal range of motion. Cardiovascular:      Rate and Rhythm: Normal rate and regular rhythm. Pulses: Normal pulses. Heart sounds: Normal heart sounds. Pulmonary:      Effort: Pulmonary effort is normal.      Breath sounds: Normal breath sounds. Musculoskeletal:      Left knee: She exhibits decreased range of motion and swelling. She exhibits no effusion, no ecchymosis, no deformity, no laceration, no erythema, normal alignment, no LCL laxity and no MCL laxity. Tenderness found. Left ankle: Normal.      Left lower leg: She exhibits tenderness.       Comments: No obvious calf swelling, erythema, or warmth. No palpable cord. Skin:     General: Skin is warm and dry. Capillary Refill: Capillary refill takes less than 2 seconds. Neurological:      General: No focal deficit present. Mental Status: She is alert and oriented to person, place, and time. Psychiatric:         Mood and Affect: Mood normal.         Behavior: Behavior normal. Behavior is cooperative. Diagnostic Study Results     Labs -  No results found for this or any previous visit (from the past 12 hour(s)). Radiologic Studies -   XR KNEE LT MIN 4 V   Final Result            Medical Decision Making   I am the first provider for this patient. I reviewed available nursing notes, past medical history, past surgical history, family history and social history. Vital Signs-Reviewed the patient's vital signs. Records Reviewed: Nursing Notes and Old Medical Records (Time of Review: 3:34 PM)      ED Course: Progress Notes, Reevaluation, and Consults:  3:34 PM  Initial assessment performed. The patients presenting problems have been discussed, and they/their family are in agreement with the care plan formulated and outlined with them. I have encouraged them to ask questions as they arise throughout their visit. 3:45 p.m.: X-ray of the left knee shows no acute osseous findings with mild tricompartmental osteoarthritis and knee effusion with areas of dystrophic soft tissue calcification. Doppler ultrasound shows thickened wall and calcifications within the left small saphenous vein with a thrombus present. There is also a fluid-filled area seen in the left popliteal fossa that could be indicative of a Baker's cyst with dimensions 2.3 cm x 2.4 cm. I discussed the findings with attending, Dr. Khris Wallace he states since the thrombus is superficial should be treated as a superficial thrombophlebitis with NSAIDs.     I discussed the x-ray and Doppler ultrasound findings at length with the patient and she verbalized understanding. She will follow-up with orthopedics for the Baker's cyst on her PCP for the thrombophlebitis and will be given naproxen which will help with both. ER return precautions were discussed at length. Provider Notes (Medical Decision Making):     Patient is a 66-year-old female presents to the ER with complaints of left knee pain and left calf pain that started gradually over the last week without any injury. She does remember twisting at one point when the pain started. On physical examination she is diffusely tender to the knee without any joint laxity, swelling, erythema, or warmth. She also has posterior proximal calf tenderness without palpable cord, erythema, or warmth. There is a palpable fullness of the popliteal fossa. Differential diagnosis: Ligamentous injury/internal derangement of the knee, DVT, thrombophlebitis, Baker's cyst    Will obtain appropriate studies to evaluate patient's complaints and treat symptomatically. Will disposition after reassessment assuming no clinical change or worsening and appropriate response to symptomatic treatment. Diagnosis     Clinical Impression:   1. Osteoarthritis of left knee, unspecified osteoarthritis type    2. Synovial cyst of left popliteal space    3. Saphenous vein thrombophlebitis, left        Disposition: Discharged home in stable condition    DISCHARGE NOTE:     Patient has been reexamined. Patient has no new complaints, changes, or physical findings. Care plan outlined and precautions discussed. Results of ultrasound and x-ray were reviewed with the patient. All medications were reviewed with the patient; will discharge home with naproxen. All of patient's questions and concerns were addressed. Patient was instructed and agrees to follow up with PCP and orthopedics, as well as to return to the ED upon further deterioration. Patient is ready to go home.     Follow-up Information     Follow up With Specialties Details Why Contact Info    [facility]  Schedule an appointment as soon as possible for a visit Follow-up from the Emergency Department for recheck of left knee pain/Baker's cyst [phone]  [place] [facility] [address]    Maribeth Rae MD Family Medicine Schedule an appointment as soon as possible for a visit Follow-up from the Emergency Department for recheck of thrombophlebitis [id number]      [facility] EMERGENCY DEPT Emergency Medicine  As needed, If symptoms worsen [address] [facility] [address]  [phone]           Discharge Medication List as of 10/21/2020  4:56 PM      START taking these medications    Details   naproxen (Naprosyn) 500 mg tablet Take 1 Tab by mouth two (2) times daily (with meals) for 10 days. , Normal, Disp-20 Tab,R-0         CONTINUE these medications which have NOT CHANGED    Details   glipiZIDE SR (GLUCOTROL XL) 5 mg CR tablet Take 5 mg by mouth daily. , Historical Med      fluticasone-vilanterol (BREO ELLIPTA) 100-25 mcg/dose inhaler Take 1 Puff by inhalation daily. , Historical Med      !! pregabalin (LYRICA) 75 mg capsule Take 1 Cap by mouth two (2) times a day. Max Daily Amount: 150 mg., Print, Disp-28 Cap, R-0      !! pregabalin (LYRICA) 150 mg capsule Take 1 Cap by mouth two (2) times a day. Max Daily Amount: 300 mg., Print, Disp-60 Cap, R-5      aspirin delayed-release 81 mg tablet Take 81 mg by mouth daily. , Historical Med      levothyroxine (SYNTHROID) 100 mcg tablet Take 100 mcg by mouth Daily (before breakfast). , Historical Med      pravastatin (PRAVACHOL) 40 mg tablet Take 40 mg by mouth nightly., Historical Med      multivitamin (ONE A DAY) tablet Take 1 Tab by mouth daily. , Historical Med      oxyCODONE-acetaminophen (PERCOCET) 5-325 mg per tablet Take 1 Tab by mouth every six (6) hours as needed for Pain.  Max Daily Amount: 4 Tabs., Print, Disp-12 Tab, R-0 ibuprofen (MOTRIN) 600 mg tablet Take 1 Tab by mouth every six (6) hours as needed for Pain., Print, Disp-20 Tab, R-0      cholecalciferol, vitamin D3, (VITAMIN D3) 2,000 unit tab Take 5,000 Units by mouth daily. , Historical Med      levETIRAcetam (KEPPRA) 1,000 mg tablet Take 1 Tab by mouth two (2) times a day., Print, Disp-60 Tab, R-0      cetirizine (ZYRTEC) 10 mg tablet Take 10 mg by mouth daily as needed., Historical Med       !! - Potential duplicate medications found. Please discuss with provider. Dictation disclaimer:  Please note that this dictation was completed with [employer], the computer voice recognition software. Quite often unanticipated grammatical, syntax, homophones, and other interpretive errors are inadvertently transcribed by the computer software. Please disregard these errors. Please excuse any errors that have escaped final proofreading.

## 2020-10-26 ENCOUNTER — OFFICE VISIT (OUTPATIENT)
Dept: ORTHOPEDIC SURGERY | Age: 60
End: 2020-10-26
Payer: MEDICAID

## 2020-10-26 VITALS
WEIGHT: 175.4 LBS | OXYGEN SATURATION: 97 % | SYSTOLIC BLOOD PRESSURE: 160 MMHG | DIASTOLIC BLOOD PRESSURE: 92 MMHG | HEIGHT: 64 IN | HEART RATE: 77 BPM | TEMPERATURE: 96.9 F | BODY MASS INDEX: 29.94 KG/M2

## 2020-10-26 DIAGNOSIS — I82.4Z2 ACUTE DEEP VEIN THROMBOSIS (DVT) OF DISTAL VEIN OF LEFT LOWER EXTREMITY (HCC): ICD-10-CM

## 2020-10-26 DIAGNOSIS — S83.8X2A MENISCAL INJURY, LEFT, INITIAL ENCOUNTER: Primary | ICD-10-CM

## 2020-10-26 PROCEDURE — 99213 OFFICE O/P EST LOW 20 MIN: CPT | Performed by: ORTHOPAEDIC SURGERY

## 2020-10-26 RX ORDER — ROSUVASTATIN CALCIUM 20 MG/1
20 TABLET, COATED ORAL
COMMUNITY

## 2020-10-26 RX ORDER — VENLAFAXINE HYDROCHLORIDE 75 MG/1
75 CAPSULE, EXTENDED RELEASE ORAL DAILY
COMMUNITY
Start: 2020-10-17

## 2020-10-26 NOTE — PATIENT INSTRUCTIONS
Meniscus Tear: Exercises  Introduction  Here are some examples of exercises for you to try. The exercises may be suggested for a condition or for rehabilitation. Start each exercise slowly. Ease off the exercises if you start to have pain. You will be told when to start these exercises and which ones will work best for you. How to do the exercises  Calf wall stretch   1. Stand facing a wall with your hands on the wall at about eye level. Put your affected leg about a step behind your other leg. 2. Keeping your back leg straight and your back heel on the floor, bend your front knee and gently bring your hip and chest toward the wall until you feel a stretch in the calf of your back leg. 3. Hold the stretch for at least 15 to 30 seconds. 4. Repeat 2 to 4 times. 5. Repeat steps 1 through 4, but this time keep your back knee bent. Hamstring wall stretch   1. Lie on your back in a doorway, with your good leg through the open door. 2. Slide your affected leg up the wall to straighten your knee. You should feel a gentle stretch down the back of your leg. 3. Hold the stretch for at least 1 minute. Then over time, try to lengthen the time you hold the stretch to as long as 6 minutes. 4. Repeat 2 to 4 times. 5. If you do not have a place to do this exercise in a doorway, there is another way to do it:  6. Lie on your back, and bend your affected leg. 7. Loop a towel under the ball and toes of that foot, and hold the ends of the towel in your hands. 8. Straighten your knee, and slowly pull back on the towel. You should feel a gentle stretch down the back of your leg. 9. Hold the stretch for at least 15 to 30 seconds. Or even better, hold the stretch for 1 minute if you can. 10. Repeat 2 to 4 times. 1. Do not arch your back. 2. Do not bend either knee. 3. Keep one heel touching the floor and the other heel touching the wall. Do not point your toes. Quad sets   1.  Sit with your leg straight and supported on the floor or a firm bed. (If you feel discomfort in the front or back of your knee, place a small towel roll under your knee.)  2. Tighten the muscles on top of your thigh by pressing the back of your knee flat down to the floor. (If you feel discomfort under your kneecap, place a small towel roll under your knee.)  3. Hold for about 6 seconds, then rest up to 10 seconds. 4. Do 8 to 12 repetitions several times a day. Straight-leg raises to the front   1. Lie on your back with your good knee bent so that your foot rests flat on the floor. Your injured leg should be straight. Make sure that your low back has a normal curve. You should be able to slip your flat hand in between the floor and the small of your back, with your palm touching the floor and your back touching the back of your hand. 2. Tighten the thigh muscles in the injured leg by pressing the back of your knee flat down to the floor. Hold your knee straight. 3. Keeping the thigh muscles tight, lift your injured leg up so that your heel is about 12 inches off the floor. Hold for 5 seconds and then lower slowly. 4. Do 8 to 12 repetitions. Straight-leg raises to the back   1. Lie on your stomach, and lift your leg straight up behind you (toward the ceiling). 2. Lift your toes about 6 inches off the floor, hold for about 6 seconds, then lower slowly. 3. Do 8 to 12 repetitions. Hamstring curls   1. Lie on your stomach with your knees straight. If your kneecap is uncomfortable, roll up a washcloth and put it under your leg just above your kneecap. 2. Lift the foot of your injured leg by bending the knee so that you bring the foot up toward your buttock. If this motion hurts, try it without bending your knee quite as far. This may help you avoid any painful motion. 3. Slowly lower your leg back to the floor. 4. Do 8 to 12 repetitions.   5. With permission from your doctor or physical therapist, you may also want to add a cuff weight to your ankle (not more than 5 pounds). With weight, you do not have to lift your leg more than 12 inches to get a hamstring workout. Wall slide with ball squeeze   1. Stand with your back against a wall and with your feet about shoulder-width apart. Your feet should be about 12 inches away from the wall. 2. Put a ball about the size of a soccer ball between your knees. Then slowly slide down the wall until your knees are bent about 20 to 30 degrees. 3. Tighten your thigh muscles by squeezing the ball between your knees. Hold that position for about 10 seconds, then stop squeezing. Rest for up to 10 seconds between repetitions. 4. Repeat 8 to 12 times. Heel raises   1. Stand with your feet a few inches apart, with your hands lightly resting on a counter or chair in front of you. 2. Slowly raise your heels off the floor while keeping your knees straight. 3. Hold for about 6 seconds, then slowly lower your heels to the floor. 4. Do 8 to 12 repetitions several times during the day. Heel dig bridging   Stop doing this exercise if it causes pain. 1. Lie on your back with both knees bent and your ankles bent so that only your heels are digging into the floor. Your knees should be bent about 90 degrees. 2. Then push your heels into the floor, squeeze your buttocks, and lift your hips off the floor until your shoulders, hips, and knees are all in a straight line. 3. Hold for about 6 seconds as you continue to breathe normally, and then slowly lower your hips back down to the floor and rest for up to 10 seconds. 4. Do 8 to 12 repetitions. Shallow standing knee bends   Do this exercise only if you have very little pain; if you have no clicking, locking, or giving way in the injured knee; and if it does not hurt while you are doing 8 to 12 repetitions. 1. Stand with your hands lightly resting on a counter or chair in front of you. Put your feet shoulder-width apart.   2. Slowly bend your knees so that you squat down like you are going to sit in a chair. Make sure your knees do not go in front of your toes. 3. Lower yourself about 6 inches. Your heels should remain on the floor at all times. 4. Rise slowly to a standing position. Follow-up care is a key part of your treatment and safety. Be sure to make and go to all appointments, and call your doctor if you are having problems. It's also a good idea to know your test results and keep a list of the medicines you take. Where can you learn more? Go to http://www.gray.com/  Enter C183 in the search box to learn more about \"Meniscus Tear: Exercises. \"  Current as of: March 2, 2020               Content Version: 12.6  © 2006-2020 [employer]. Care instructions adapted under license by [employer] (which disclaims liability or warranty for this information). If you have questions about a medical condition or this instruction, always ask your healthcare professional. [address] any warranty or liability for your use of this information.

## 2020-10-26 NOTE — PROGRESS NOTES
Glo Lantigua  1960   Chief Complaint   Patient presents with    Knee Pain     left knee        HISTORY OF PRESENT ILLNESS  Glo Lantigua is a 61 y.o. female who presents today for evaluation of left knee pain. She rates her pain 10/10 today. Pain has been present since 10/21/2020 after turning the wrong way. She could not put pressure on her leg after this. Pt went to the ED, was given Naproxen. Pt presents today ambulating with a cane. Pt states she has upper leg pain and pain in the knee and behind the knee. She notes radiating pain that moves down the leg and a shooting pain that moves up the leg. Associated symptom of swelling in the knee today. Patient describes the pain as burning and sharp that is Constant in nature. Symptoms are worse with walking and standing, Activity and is better with  pain meds. Associated symptoms include Swelling. Since problem started, it: is unchanged. Pain does not wake patient up at night. Has taken Naproxen for the problem. Has tried following treatments: Injections:NO; Brace:NO;  Therapy:NO; Cane/Crutch:YES       Allergies   Allergen Reactions    Azithromycin Seizures    Erythromycin Seizures     Reacts with Keppra    Tomato Rash        Past Medical History:   Diagnosis Date    Chronic pain     Diabetes ([facility]     Epilepsy ([facility]     HNP (herniated nucleus pulposus), lumbar     Hot flashes     Hypercholesterolemia     Hypothyroidism     Migraine     MRSA bacteremia     Multilevel degenerative disc disease     Patient reports no asthma    Radiculitis     Right lower extremity    Right leg pain     Seizures (HCC)     Weight loss       Social History     Socioeconomic History    Marital status:      Spouse name: Not on file    Number of children: Not on file    Years of education: Not on file    Highest education level: Not on file   Occupational History    Not on file   Social Needs    Financial resource strain: Not on file   Oley-Robb insecurity     Worry: Not on file     Inability: Not on file    Transportation needs     Medical: Not on file     Non-medical: Not on file   Tobacco Use    Smoking status: Current Every Day Smoker     Packs/day: 0.25     Years: 36.00     Pack years: 9.00    Smokeless tobacco: Current User   Substance and Sexual Activity    Alcohol use: Yes     Comment: occasional    Drug use: Not on file    Sexual activity: Not on file     Comment: Hysterectomy   Lifestyle    Physical activity     Days per week: Not on file     Minutes per session: Not on file    Stress: Not on file   Relationships    Social connections     Talks on phone: Not on file     Gets together: Not on file     Attends Taoism service: Not on file     Active member of club or organization: Not on file     Attends meetings of clubs or organizations: Not on file     Relationship status: Not on file    Intimate partner violence     Fear of current or ex partner: Not on file     Emotionally abused: Not on file     Physically abused: Not on file     Forced sexual activity: Not on file   Other Topics Concern    Not on file   Social History Narrative    Not on file      Past Surgical History:   Procedure Laterality Date    HX APPENDECTOMY      HX HYSTERECTOMY      HX PELVIC LAPAROSCOPY      HX TUBAL LIGATION        History reviewed. No pertinent family history. Current Outpatient Medications   Medication Sig    rosuvastatin (Crestor) 20 mg tablet Take 20 mg by mouth nightly.  insulin pump (PATIENT SUPPLIED) misc by SubCUTAneous route continuous.  venlafaxine-SR (EFFEXOR-XR) 75 mg capsule     fluticasone-vilanterol (BREO ELLIPTA) 100-25 mcg/dose inhaler Take 1 Puff by inhalation daily.  aspirin delayed-release 81 mg tablet Take 81 mg by mouth daily.  levothyroxine (SYNTHROID) 100 mcg tablet Take 100 mcg by mouth Daily (before breakfast).  multivitamin (ONE A DAY) tablet Take 1 Tab by mouth daily.     ibuprofen (MOTRIN) 600 mg tablet Take 1 Tab by mouth every six (6) hours as needed for Pain. (Patient taking differently: Take 800 mg by mouth every six (6) hours as needed for Pain.)    cholecalciferol, vitamin D3, (VITAMIN D3) 2,000 unit tab Take 5,000 Units by mouth daily.  levETIRAcetam (KEPPRA) 1,000 mg tablet Take 1 Tab by mouth two (2) times a day. (Patient taking differently: Take 2,000 mg by mouth daily.)    cetirizine (ZYRTEC) 10 mg tablet Take 10 mg by mouth daily as needed.  naproxen (Naprosyn) 500 mg tablet Take 1 Tab by mouth two (2) times daily (with meals) for 10 days.  glipiZIDE SR (GLUCOTROL XL) 5 mg CR tablet Take 5 mg by mouth daily.  pregabalin (LYRICA) 75 mg capsule Take 1 Cap by mouth two (2) times a day. Max Daily Amount: 150 mg.  pregabalin (LYRICA) 150 mg capsule Take 1 Cap by mouth two (2) times a day. Max Daily Amount: 300 mg.  pravastatin (PRAVACHOL) 40 mg tablet Take 40 mg by mouth nightly.  oxyCODONE-acetaminophen (PERCOCET) 5-325 mg per tablet Take 1 Tab by mouth every six (6) hours as needed for Pain. Max Daily Amount: 4 Tabs. No current facility-administered medications for this visit. REVIEW OF SYSTEM   Patient denies: Weight loss, Fever/Chills, HA, Visual changes, Fatigue, Chest pain, SOB, Abdominal pain, N/V/D/C, Blood in stool or urine, Edema. Pertinent positive as above in HPI. All others were negative    PHYSICAL EXAM:   Visit Vitals  BP (!) 160/92 (BP 1 Location: Right arm, BP Patient Position: Sitting)   Pulse 77   Temp 96.9 °F (36.1 °C) (Temporal)   Ht 5' 4\" (1.626 m)   Wt 175 lb 6.4 oz (79.6 kg)   SpO2 97%   BMI 30.11 kg/m²     The patient is a well-developed, well-nourished female   in no acute distress. The patient is alert and oriented times three. The patient is alert and oriented times three. Mood and affect are normal.  LYMPHATIC: lymph nodes are not enlarged and are within normal limits  SKIN: normal in color and non tender to palpation.  There are no bruises or abrasions noted. NEUROLOGICAL: Motor sensory exam is within normal limits. Reflexes are equal bilaterally. There is normal sensation to pinprick and light touch  MUSCULOSKELETAL:  Examination Left knee   Skin Intact   Range of motion 0-130   Effusion +   Medial joint line tenderness +   Lateral joint line tenderness -   Tenderness Pes Bursa -   Tenderness insertion MCL -   Tenderness insertion LCL -   Devangs -   Patella crepitus +   Patella grind -   Lachman -   Pivot shift -   Anterior drawer -   Posterior drawer -   Varus stress -   Valgus stress -   Neurovascular Intact   Calf Swelling and Tenderness to Palpation -   Aurora's Test -   Hamstring Cord Tightness -          IMAGING: XR of left knee from [place] dated 10/21/2020 was reviewed and read by Dr. Fabio Casanova: No acute osseous findings. Mild knee effusion. Doppler US of LLE dated 10/21/2020 was reviewed and read by Dr. Fabio Casanova:   IMPRESSION:  No evidence of a deep venous thrombosis in the left lower extremity. There is thrombus present in the left small saphenous vein consistent with superficial thrombophlebitis. There is a fluid filled cyst in the left popliteal fossa consistent with a Baker's cyst.        IMPRESSION:      ICD-10-CM ICD-9-CM    1. Meniscal injury, left, initial encounter  S83.8X2A 959.7 MRI KNEE LT WO CONT   2. Acute deep vein thrombosis (DVT) of distal vein of left lower extremity (HCC)  I82.4Z2 453.42 REFERRAL TO VASCULAR SURGERY        PLAN:  1. Pt presents today with left knee pain and swelling and I am ordering an MRI to r/o a meniscus tear. She also presents with an untreated Doppler Us-documented DVT in the saphenous vein and I am putting in an urgent referral to Vascular. I advised the patient that she should be taking a full 325 mg Aspirin 2 X day. Risk factors include: dm   2. No ultrasound exam indicated today  3. No cortisone injection indicated today   4.  No Physical/Occupational Therapy indicated today  5. Yes diagnostic test indicated today: MRI L KNEE  6. No durable medical equipment indicated today  7. Yes referral indicated today VASCULAR  8. No medications indicated today:   9. No Narcotic indicated today      RTC following MRI      Scribed by You Cadet [address]) as dictated by Beckie Thomas MD    I, Dr. Beckie Thomas, confirm that all documentation is accurate.     Beckie Thomas M.D.   [facility]

## 2020-10-27 DIAGNOSIS — S83.8X2A MENISCAL INJURY, LEFT, INITIAL ENCOUNTER: ICD-10-CM

## 2020-11-13 ENCOUNTER — HOSPITAL ENCOUNTER (OUTPATIENT)
Age: 60
Discharge: HOME | End: 2020-11-13
Attending: ORTHOPAEDIC SURGERY
Payer: MEDICAID

## 2020-11-13 PROCEDURE — 73721 MRI JNT OF LWR EXTRE W/O DYE: CPT

## 2020-11-17 ENCOUNTER — OFFICE VISIT (OUTPATIENT)
Dept: ORTHOPEDIC SURGERY | Age: 60
End: 2020-11-17
Payer: MEDICAID

## 2020-11-17 VITALS
WEIGHT: 168 LBS | SYSTOLIC BLOOD PRESSURE: 142 MMHG | HEIGHT: 64 IN | BODY MASS INDEX: 28.68 KG/M2 | TEMPERATURE: 97.1 F | OXYGEN SATURATION: 100 % | HEART RATE: 82 BPM | DIASTOLIC BLOOD PRESSURE: 71 MMHG

## 2020-11-17 DIAGNOSIS — S83.242A TEAR OF MEDIAL MENISCUS OF LEFT KNEE, CURRENT, UNSPECIFIED TEAR TYPE, INITIAL ENCOUNTER: Primary | ICD-10-CM

## 2020-11-17 DIAGNOSIS — M17.12 PRIMARY OSTEOARTHRITIS OF LEFT KNEE: ICD-10-CM

## 2020-11-17 PROCEDURE — 99214 OFFICE O/P EST MOD 30 MIN: CPT | Performed by: ORTHOPAEDIC SURGERY

## 2020-11-17 NOTE — PROGRESS NOTES
Andrew Hernandez  1960   Chief Complaint   Patient presents with    Knee Pain     left knee MRI review        HISTORY OF PRESENT ILLNESS  Andrew Hernandez is a 61 y.o. female who presents today for reevaluation of left knee and MRI review. Patient rates pain as 10/10 today. Pain has been present since 10/21/2020 after turning the wrong way. She could not put pressure on her leg after this. Pt went to the ED, was given Naproxen. Pt presents today ambulating with a cane. Pt states she has upper leg pain and pain in the knee and behind the knee. She notes radiating pain that moves down the leg and a shooting pain that moves up the leg. Associated symptom of swelling in the knee today. Patient denies any fever, chills, chest pain, shortness of breath or calf pain. The remainder of the review of systems is negative. There are no new illness or injuries to report since last seen in the office. There are no changes to medications, allergies, family or social history. Pain Assessment  11/17/2020   Location of Pain Knee   Location Modifiers Left   Severity of Pain 10   Quality of Pain Throbbing; Sharp;Burning   Duration of Pain Persistent   Frequency of Pain Constant   Date Pain First Started -   Aggravating Factors -   Aggravating Factors Comment -   Limiting Behavior Yes   Relieving Factors (No Data)   Relieving Factors Comment tylenol   Result of Injury -   Work-Related Injury -   Type of Injury -       PHYSICAL EXAM:   Visit Vitals  BP (!) 142/71 (BP 1 Location: Right arm, BP Patient Position: Sitting)   Pulse 82   Temp 97.1 °F (36.2 °C) (Temporal)   Ht 5' 4\" (1.626 m)   Wt 168 lb (76.2 kg)   SpO2 100%   BMI 28.84 kg/m²     The patient is a well-developed, well-nourished female   in no acute distress. The patient is alert and oriented times three. The patient is alert and oriented times three.  Mood and affect are normal.  LYMPHATIC: lymph nodes are not enlarged and are within normal limits  SKIN: normal in color and non tender to palpation. There are no bruises or abrasions noted. NEUROLOGICAL: Motor sensory exam is within normal limits. Reflexes are equal bilaterally. There is normal sensation to pinprick and light touch  MUSCULOSKELETAL:  Examination Left knee   Skin Intact   Range of motion 0-130   Effusion +   Medial joint line tenderness +   Lateral joint line tenderness -   Tenderness Pes Bursa -   Tenderness insertion MCL -   Tenderness insertion LCL -   Devangs -   Patella crepitus +   Patella grind -   Lachman -   Pivot shift -   Anterior drawer -   Posterior drawer -   Varus stress -   Valgus stress -   Neurovascular Intact   Calf Swelling and Tenderness to Palpation -   Aurora's Test -   Hamstring Cord Tightness -       IMAGING: MRI of left knee dated 11/13/2020 was reviewed and read by Dr. Supriya Salas:   IMPRESSION:  1. Complex and severe tear in the posterior horn medial meniscus with potential displaced meniscal fragment to above the meniscal root. Degenerative disease with medial subluxation medial meniscus and bulging of the MCL. Moderate MCL sprain. 2. Possible partial thickness tear or sprain of the ACL. 3. Joint effusion and large popliteal cyst with partial rupture, likely symptomatic as well. 4. Grade III chondromalacia patella medial facet. Retinacula are intact. Prepatellar soft tissue edema or bursitis. XR of left knee from [facility] dated 10/21/2020 was reviewed and read by Dr. Supriya Salas: No acute osseous findings. Mild knee effusion.           Doppler US of LLE dated 10/21/2020 was reviewed and read by Dr. Supriya Salas:   IMPRESSION:  No evidence of a deep venous thrombosis in the left lower extremity.  There is thrombus present in the left small saphenous vein consistent with superficial thrombophlebitis. Eric Hearing is a fluid filled cyst in the left popliteal fossa consistent with a Baker's cyst.      IMPRESSION:      ICD-10-CM ICD-9-CM    1.  Tear of medial meniscus of left knee, current, unspecified tear type, initial encounter  S83.242A 836.0    2. Primary osteoarthritis of left knee  M17.12 715.16         PLAN:   1. I discussed the risks and benefits and potential adverse outcomes of both operative vs non operative treatment of left knee medial meniscus tear with the patient and patient wishes to proceed with arthroscopic left partial medial meniscectomy. Risks of operative intervention include but not limited to bleeding, infection, deep vein thrombosis, pulmonary embolism, death, limb length discrepancy, reflexive sympathetic dystrophy, fat embolism syndrome,damage to blood vessels and nerves, malunion, non-union, delayed union, failure of hardware, post traumatic arthritis, stroke, heart attack, and death. Patient understands that infection may arise and may require numerous surgeries. The patient was counseled at length about the risks of cornelius Covid-19 during their perioperative period and any recovery window from their procedure. The patient was made aware that cornelius Covid-19  may worsen their prognosis for recovering from their procedure and lend to a higher morbidity and/or mortality risk. All material risks, benefits, and reasonable alternatives including postponing the procedure were discussed. The patient does  wish to proceed with the procedure at this time. History and physical exam to be preformed at a later date. Risk factors include: dm, primary OA  2. No ultrasound exam indicated today  3. No cortisone injection indicated today   4. No Physical/Occupational Therapy indicated today  5. No diagnostic test indicated today:   6. No durable medical equipment indicated today  7. No referral indicated today   8. No medications indicated today:   9.  No Narcotic indicated today       RTC H&P      Scribed by You Setting Melisaolett Sprain) as dictated by Beckie Thomas MD    I, Dr. Beckie Thomas, confirm that all documentation is accurate.     Gideon Starr M.D.   [facility]

## 2021-01-04 DIAGNOSIS — Z01.818 PREOP EXAMINATION: Primary | ICD-10-CM

## 2021-01-04 DIAGNOSIS — Z01.818 PREOP EXAMINATION: ICD-10-CM

## 2021-01-13 ENCOUNTER — HOSPITAL ENCOUNTER (OUTPATIENT)
Dept: PREADMISSION TESTING | Age: 61
Discharge: HOME | End: 2021-01-13
Payer: MEDICAID

## 2021-01-13 LAB
ANION GAP SERPL CALC-SCNC: 6 MMOL/L (ref 3–18)
ATRIAL RATE: 80 BPM
BASOPHILS # BLD: 0 K/UL (ref 0–0.1)
BASOPHILS NFR BLD: 0 % (ref 0–2)
BUN SERPL-MCNC: 8 MG/DL (ref 7–18)
BUN/CREAT SERPL: 9 (ref 12–20)
CALCIUM SERPL-MCNC: 9.2 MG/DL (ref 8.5–10.1)
CALCULATED P AXIS, ECG09: 48 DEGREES
CALCULATED R AXIS, ECG10: 49 DEGREES
CALCULATED T AXIS, ECG11: 2 DEGREES
CHLORIDE SERPL-SCNC: 103 MMOL/L (ref 100–111)
CO2 SERPL-SCNC: 29 MMOL/L (ref 21–32)
CREAT SERPL-MCNC: 0.87 MG/DL (ref 0.6–1.3)
DIAGNOSIS, 93000: NORMAL
DIFFERENTIAL METHOD BLD: NORMAL
EOSINOPHIL # BLD: 0.3 K/UL (ref 0–0.4)
EOSINOPHIL NFR BLD: 4 % (ref 0–5)
ERYTHROCYTE [DISTWIDTH] IN BLOOD BY AUTOMATED COUNT: 12.3 % (ref 11.6–14.5)
GLUCOSE SERPL-MCNC: 276 MG/DL (ref 74–99)
HCT VFR BLD AUTO: 38.1 % (ref 35–45)
HGB BLD-MCNC: 12.8 G/DL (ref 12–16)
LYMPHOCYTES # BLD: 2.5 K/UL (ref 0.9–3.6)
LYMPHOCYTES NFR BLD: 33 % (ref 21–52)
MCH RBC QN AUTO: 30.1 PG (ref 24–34)
MCHC RBC AUTO-ENTMCNC: 33.6 G/DL (ref 31–37)
MCV RBC AUTO: 89.6 FL (ref 74–97)
MONOCYTES # BLD: 0.3 K/UL (ref 0.05–1.2)
MONOCYTES NFR BLD: 4 % (ref 3–10)
NEUTS SEG # BLD: 4.4 K/UL (ref 1.8–8)
NEUTS SEG NFR BLD: 59 % (ref 40–73)
P-R INTERVAL, ECG05: 152 MS
PLATELET # BLD AUTO: 232 K/UL (ref 135–420)
PMV BLD AUTO: 11.4 FL (ref 9.2–11.8)
POTASSIUM SERPL-SCNC: 3.9 MMOL/L (ref 3.5–5.5)
Q-T INTERVAL, ECG07: 382 MS
QRS DURATION, ECG06: 78 MS
QTC CALCULATION (BEZET), ECG08: 440 MS
RBC # BLD AUTO: 4.25 M/UL (ref 4.2–5.3)
SODIUM SERPL-SCNC: 138 MMOL/L (ref 136–145)
VENTRICULAR RATE, ECG03: 80 BPM
WBC # BLD AUTO: 7.6 K/UL (ref 4.6–13.2)

## 2021-01-13 PROCEDURE — 80048 BASIC METABOLIC PNL TOTAL CA: CPT

## 2021-01-13 PROCEDURE — 93005 ELECTROCARDIOGRAM TRACING: CPT

## 2021-01-13 PROCEDURE — 85025 COMPLETE CBC W/AUTO DIFF WBC: CPT

## 2021-01-13 PROCEDURE — 36415 COLL VENOUS BLD VENIPUNCTURE: CPT

## 2021-01-18 NOTE — PATIENT INSTRUCTIONS
Dr. Izzy Byrd What is the surgery? - This is an outpatient procedure at either [place] [address] or [address] [place] [address] will be completely asleep for procedure. Dr. Caryl Salinas will make 2 small incisions in your knee. He will take a tour of your knee with the camera and then address the meniscal tear(s). We will be able to evaluate if any arthritis in your knee but this surgery is not to treat your arthritis. - Total surgery takes about 25-30 mins What can you expect after surgery? - You will have a bulky dressing on your knee that you can remove 2 days after surgery. You will be able to shower 2 days after surgery but no soaking in a bath, hot tub, ocean or pool x 2 weeks to allow for full wound healing. No special brace is needed. - You will be on crutches or a walker when you leave the hospital. You can place weight on your leg as tolerated starting immediately. You are usually on crutches or your walker for about 4-5 days.  
- Even though you can place weight on your leg, we recommend no walking or standing longer than 10mins for the first week. We will gradually increase your activities after that point.   
- Dr. Caryl Salinas will start physical therapy for you when you return for your 1 week post op apt - It will take your 6-8 weeks to fully recover from your surgery. When can I return to work? - Most patients return to desk work only after 1-2 weeks. We recommend no prolonged walking or standing, climbing, kneeling, or crawling x 6-8 weeks. Not all knee arthroscopies are the same. The specifics of your individual case will be discussed at length with you by Dr. Caryl Salinas and his Physician Assistant. Darcy Geiger Surgical Coordinator [address] [facility] Annia. Nhan. [address], [facility] Toni [email or web address] [email or web address] 
P: [phone] F: [phone]

## 2021-01-19 ENCOUNTER — OFFICE VISIT (OUTPATIENT)
Dept: ORTHOPEDIC SURGERY | Age: 61
End: 2021-01-19

## 2021-01-19 VITALS
TEMPERATURE: 97.3 F | DIASTOLIC BLOOD PRESSURE: 64 MMHG | SYSTOLIC BLOOD PRESSURE: 124 MMHG | WEIGHT: 179 LBS | OXYGEN SATURATION: 100 % | HEIGHT: 64 IN | BODY MASS INDEX: 30.56 KG/M2 | HEART RATE: 98 BPM

## 2021-01-19 DIAGNOSIS — M17.12 PRIMARY OSTEOARTHRITIS OF LEFT KNEE: ICD-10-CM

## 2021-01-19 DIAGNOSIS — S83.242A TEAR OF MEDIAL MENISCUS OF LEFT KNEE, CURRENT, UNSPECIFIED TEAR TYPE, INITIAL ENCOUNTER: Primary | ICD-10-CM

## 2021-01-19 RX ORDER — ACETAMINOPHEN 325 MG/1
1000 TABLET ORAL ONCE
Status: CANCELLED | OUTPATIENT
Start: 2021-01-19 | End: 2021-01-19

## 2021-01-19 RX ORDER — ASPIRIN 325 MG
325 TABLET ORAL DAILY
COMMUNITY
End: 2022-10-19 | Stop reason: ALTCHOICE

## 2021-01-19 RX ORDER — HYDROCODONE BITARTRATE AND ACETAMINOPHEN 10; 325 MG/1; MG/1
1 TABLET ORAL
Qty: 40 TAB | Refills: 0 | Status: SHIPPED | OUTPATIENT
Start: 2021-01-19 | End: 2021-01-26

## 2021-01-19 NOTE — H&P
HISTORY AND PHYSICAL          Patient: Bhakti Lovelace                MRN: [id number]       SSN: [id number]  YOB: 1960          AGE: 61 y.o. SEX: female      Patient scheduled for:  Left knee arthroscopic partial medial menisectomy    Surgeon: Tabitha Lesches MD    ANESTHESIA TYPE:  General    HISTORY:     The patient was seen in the office today for a preoperative history and physical for an upcoming above listed surgery. The patient is a pleasant 61 y.o. female who has a history of left knee pain. Patient rates pain as 10/10 today. Pain has been present since 10/21/2020 after turning the wrong way. She could not put pressure on her leg after this. Pt went to the ED, was given Naproxen. Pt presents today ambulating with a cane. Pt states she has upper leg pain and pain in the knee and behind the knee. She notes radiating pain that moves down the leg and a shooting pain that moves up the leg. Associated symptom of swelling in the knee today. Due to the current findings, affected activity of daily living and continued pain and discomfort, surgical intervention is indicated. The alternatives, risks, and complications, including but not limited to infection, blood loss, need for blood transfusion, neurovascular damage, sudhakar-incisional numbness, subcutaneous hematoma, bone fracture, anesthetic complications, DVT, PE, death, RSD, postoperative stiffness and pain, possible surgical scar, delayed healing and nonhealing, reflexive sympathetic dystrophy, damage to blood vessels and nerves, need for more surgery, MI, and stroke,  failure of hardware, gait disturbances,have been discussed. The patient understands and wishes to proceed with surgery.      PAST MEDICAL HISTORY:     Past Medical History:   Diagnosis Date    Chronic pain     Diabetes [facility] [address]     Epilepsy ([facility] [address]     HNP (herniated nucleus pulposus), lumbar     Hot flashes     Hypercholesterolemia     Hypothyroidism     Migraine     MRSA bacteremia     Multilevel degenerative disc disease     Patient reports no asthma    Radiculitis     Right lower extremity    Right leg pain     Seizures (HCC)     Weight loss        CURRENT MEDICATIONS:     Current Outpatient Medications   Medication Sig Dispense Refill    rosuvastatin (Crestor) 20 mg tablet Take 20 mg by mouth nightly.  insulin pump (PATIENT SUPPLIED) misc by SubCUTAneous route continuous.  venlafaxine-SR (EFFEXOR-XR) 75 mg capsule       fluticasone-vilanterol (BREO ELLIPTA) 100-25 mcg/dose inhaler Take 1 Puff by inhalation daily.  aspirin delayed-release 81 mg tablet Take 325 mg by mouth daily.  levothyroxine (SYNTHROID) 100 mcg tablet Take 100 mcg by mouth Daily (before breakfast).  multivitamin (ONE A DAY) tablet Take 1 Tab by mouth daily.  ibuprofen (MOTRIN) 600 mg tablet Take 1 Tab by mouth every six (6) hours as needed for Pain. (Patient taking differently: Take 800 mg by mouth every six (6) hours as needed for Pain.) 20 Tab 0    cholecalciferol, vitamin D3, (VITAMIN D3) 2,000 unit tab Take 5,000 Units by mouth daily.  levETIRAcetam (KEPPRA) 1,000 mg tablet Take 1 Tab by mouth two (2) times a day. (Patient taking differently: Take 2,000 mg by mouth daily.) 60 Tab 0    cetirizine (ZYRTEC) 10 mg tablet Take 10 mg by mouth daily as needed.  glipiZIDE SR (GLUCOTROL XL) 5 mg CR tablet Take 5 mg by mouth daily.  pregabalin (LYRICA) 75 mg capsule Take 1 Cap by mouth two (2) times a day. Max Daily Amount: 150 mg. 28 Cap 0    pregabalin (LYRICA) 150 mg capsule Take 1 Cap by mouth two (2) times a day. Max Daily Amount: 300 mg. 60 Cap 5    pravastatin (PRAVACHOL) 40 mg tablet Take 40 mg by mouth nightly.  oxyCODONE-acetaminophen (PERCOCET) 5-325 mg per tablet Take 1 Tab by mouth every six (6) hours as needed for Pain. Max Daily Amount: 4 Tabs.  12 Tab 0       ALLERGIES:     Allergies   Allergen Reactions    Azithromycin Seizures    Erythromycin Seizures     Reacts with Keppra    Tomato Rash         SURGICAL HISTORY:     Past Surgical History:   Procedure Laterality Date    HX APPENDECTOMY      HX HYSTERECTOMY      HX PELVIC LAPAROSCOPY      HX TUBAL LIGATION         SOCIAL HISTORY:     Social History     Socioeconomic History    Marital status:      Spouse name: Not on file    Number of children: Not on file    Years of education: Not on file    Highest education level: Not on file   Tobacco Use    Smoking status: Current Every Day Smoker     Packs/day: 0.25     Years: 36.00     Pack years: 9.00    Smokeless tobacco: Current User   Substance and Sexual Activity    Alcohol use: Yes     Comment: occasional       FAMILY HISTORY:     History reviewed. No pertinent family history. REVIEW OF SYSTEMS:     Negative for fevers, chills, chest pain, shortness of breath, weight loss, recent illness     General: Negative for fever and chills. No unexpected change in weight. Denies fatigue. No change in appetite. Skin: Negative for rash or itching. HEENT: Negative for congestion, sore throat, neck pain and neck stiffness. No change in vision or hearing. Hasn't noted any enlarged lymph nodes in the neck. Cardiovascular:  Negative for chest pain and palpitations. Has not noted pedal edema. Respiratory: Negative for cough, colds, sinus, hemoptysis, shortness of breath and wheezing. Gastrointestinal: Negative for nausea and vomiting, rectal bleeding, coffee ground emesis, abdominal pain, diarrhea and constipation. Genitourinary: Negative for dysuria, frequency urgency, or burning on micturition. No flank pain, no foul smelling urine, no difficulty with initiating urination. Hematological: Negative for bleeding or easy bruising. Musculoskeletal: Negative  for arthralgias, back pain or neck pain. Neurological: Negative for dizziness, seizures or syncopal episodes. Denies headaches.    Endocrine: Denies excessive thirst.  No heat/cold intolerance. Psychiatric: Negative for depression or insomnia. PHYSICAL EXAMINATION:     VITALS:   Visit Vitals  /64 (BP 1 Location: Right arm, BP Patient Position: Sitting)   Pulse 98   Temp 97.3 °F (36.3 °C) (Skin)   Ht 5' 4\" (1.626 m)   Wt 179 lb (81.2 kg)   SpO2 100%   BMI 30.73 kg/m²     GEN:  Well developed, well nourished 61 y.o. female in no acute distress. HEENT: Normocephalic and atraumatic. Eyes: Conjunctivae and EOM are normal.Pupils are equal, round, and reactive to light. External ear normal appearance, external nose normal appearing. Mouth/Throat: Oropharynx is clear and moist, able to handle oral secretions w/out difficulty, airway patent  NECK: Supple. Normal ROM, No lymphadenopathy. Trachea is midline. No bruising, swelling or deformity  RESP: Clear to auscultation bilaterally. No wheezes, rales, rhonchi. Normal effort and breath sounds. No respiratory distress  CARDIO:  Normal rate, regular rhythm and normal heart sounds. No MGR. ABDOMEN: Soft, non-tender, non-distended, normoactive bowel sounds in all four quadrants. There is no tenderness. There is no rebound and no guarding. BACK: No CVA or spinal tenderness  BREAST:  Deferred  PELVIC:    Deferred   RECTAL:  Deferred   :           Deferred  EXTREMITIES: EXAMINATION OF:   Examination Left knee   Skin Intact   Range of motion 0-130   Effusion +   Medial joint line tenderness +   Lateral joint line tenderness -   Tenderness Pes Bursa -   Tenderness insertion MCL -   Tenderness insertion LCL -   Devangs -   Patella crepitus +   Patella grind -   Lachman -   Pivot shift -   Anterior drawer -   Posterior drawer -   Varus stress -   Valgus stress -   Neurovascular Intact   Calf Swelling and Tenderness to Palpation -   Aurora's Test -   Hamstring Cord Tightness -        NEUROVASCULAR: Sensation intact to light touch and strength grossly intact and symmetrical. No nystagmus.  Positive distal pulses and capillary refill. DVT ASSESSMENT:  There is not  calf tenderness. No evidence of DVT seen on physical exam.  MOTOR: In tact  PSYCH: Alert an oriented to person, place and time. Mood, memory, affect, behavior and judgment normal       RADIOGRAPHS & DIAGNOSTIC STUDIES:     MRI/xray reveals : IMAGING: MRI of left knee dated 11/13/2020 was reviewed and read by Dr. Nicky Bardales:   IMPRESSION:  1. Complex and severe tear in the posterior horn medial meniscus with potential displaced meniscal fragment to above the meniscal root. Degenerative disease with medial subluxation medial meniscus and bulging of the MCL. Moderate MCL sprain. 2. Possible partial thickness tear or sprain of the ACL. 3. Joint effusion and large popliteal cyst with partial rupture, likely symptomatic as well. 4. Grade III chondromalacia patella medial facet. Retinacula are intact. Prepatellar soft tissue edema or bursitis.          XR of left knee from [facility] 10/21/2020 was reviewed and read by Dr. Nicky Bardales: No acute osseous findings.  Mild knee effusion.           Doppler US of LLE dated 10/21/2020 was reviewed and read by Dr. Nicky Bardales:   IMPRESSION:  No evidence of a deep venous thrombosis in the left lower extremity.  There is thrombus present in the left small saphenous vein consistent with superficial thrombophlebitis. Shelton Pascual is a fluid filled cyst in the left popliteal fossa consistent with a Baker's cyst.         LABS:       @  CBC:   Lab Results   Component Value Date/Time    WBC 7.6 01/13/2021 11:32 AM    RBC 4.25 01/13/2021 11:32 AM    HGB 12.8 01/13/2021 11:32 AM    HCT 38.1 01/13/2021 11:32 AM    PLATELET 748 56/83/7666 11:32 AM    and BMP:   Lab Results   Component Value Date/Time    Glucose 276 (H) 01/13/2021 11:32 AM    Sodium 138 01/13/2021 11:32 AM    Potassium 3.9 01/13/2021 11:32 AM    Chloride 103 01/13/2021 11:32 AM    CO2 29 01/13/2021 11:32 AM    BUN 8 01/13/2021 11:32 AM    Creatinine 0.87 01/13/2021 11:32 AM    Calcium 9.2 01/13/2021 11:32 AM   @    Preoperative labs were reviewed and are substantially within normal limits   EKG: scanned in chart      ASSESSMENT:       Encounter Diagnoses   Name Primary?  Tear of medial meniscus of left knee, current, unspecified tear type, initial encounter Yes    Primary osteoarthritis of left knee        PLAN:     Again, the alternatives, risks, and complications, as well as expected outcome were discussed. The patient understands and agrees to proceed with left knee arthroscopic partial medial menisectomy. The patient was counseled at length about the risks of cornelius Covid-19 during their perioperative period and any recovery window from their procedure. The patient was made aware that cornelius Covid-19  may worsen their prognosis for recovering from their procedure and lend to a higher morbidity and/or mortality risk. All material risks, benefits, and reasonable alternatives including postponing the procedure were discussed. The patient does  wish to proceed with the procedure at this time. Patient given orders listed below:    No orders of the defined types were placed in this encounter.         Chayo Morton PA-C  1/19/2021  10:42 AM

## 2021-01-19 NOTE — DIABETES MGMT
INSULIN PUMP CONSULT/ Plan Of Care    How long have you had diabetes? 'Couple years'   How long have you had an insulin pump? About a year   Where did you learn how to use it? [facility] office  What is your blood glucose target? 120-140   How often do you usually test your blood glucose in a day?  wears CGM - will remove for surgery and place new sensor when get home  What was your most recent A1C and date?  9.8% in Dec 2020   Who is your endocrinologist? Reyna Saini NP  When was your last visit to your endocrinologist?  Dec 2020    INSULIN PUMP    Brand of pump and model #:   Medronic 772   Type of insulin used    Humalog    Type of infusion set    What are your basal rate(s)?    1.2    What is your sensitivity factor? What is your insulin to carbohydrate ratio? 1:8 - utilizes carb counting with meals    What is your total daily dose? Varies with meal time boluses   What conventional insulin dose do you use if you pump were inoperable? (\"off pump plan\")   lantus/humalog     Do you often have hypoglycemia? Rarely - is symptomatic with BG less than 80 mg/dl. How do you treat hypoglycemia? Juice   Do you have any site problems? No   Do you feel able and confident to manage your pump while here? Yes - will set at basal rate at [place] prior to surgery  Who helps you manage your insulin pump when you are not able?   - also has DM and pump     States she has been receiving steroid injections recently r/t cervical pain with elevated BG. Will keep her pump at basal rate for OR.       Caesar Banuelos MPH RN CDE  Pager [phone]

## 2021-01-22 ENCOUNTER — HOSPITAL ENCOUNTER (OUTPATIENT)
Dept: PREADMISSION TESTING | Age: 61
Discharge: HOME | End: 2021-01-22
Payer: MEDICAID

## 2021-01-22 PROCEDURE — U0003 INFECTIOUS AGENT DETECTION BY NUCLEIC ACID (DNA OR RNA); SEVERE ACUTE RESPIRATORY SYNDROME CORONAVIRUS 2 (SARS-COV-2) (CORONAVIRUS DISEASE [COVID-19]), AMPLIFIED PROBE TECHNIQUE, MAKING USE OF HIGH THROUGHPUT TECHNOLOGIES AS DESCRIBED BY CMS-2020-01-R: HCPCS

## 2021-01-23 LAB — SARS-COV-2, COV2NT: NOT DETECTED

## 2021-01-26 ENCOUNTER — ANESTHESIA EVENT (OUTPATIENT)
Dept: SURGERY | Age: 61
End: 2021-01-26
Payer: MEDICAID

## 2021-01-27 ENCOUNTER — HOSPITAL ENCOUNTER (OUTPATIENT)
Age: 61
Setting detail: OUTPATIENT SURGERY
Discharge: HOME | End: 2021-01-27
Attending: ORTHOPAEDIC SURGERY | Admitting: ORTHOPAEDIC SURGERY
Payer: MEDICAID

## 2021-01-27 ENCOUNTER — ANESTHESIA (OUTPATIENT)
Dept: SURGERY | Age: 61
End: 2021-01-27
Payer: MEDICAID

## 2021-01-27 VITALS
OXYGEN SATURATION: 93 % | SYSTOLIC BLOOD PRESSURE: 120 MMHG | HEIGHT: 64 IN | WEIGHT: 176 LBS | RESPIRATION RATE: 14 BRPM | TEMPERATURE: 96.8 F | HEART RATE: 82 BPM | BODY MASS INDEX: 30.05 KG/M2 | DIASTOLIC BLOOD PRESSURE: 72 MMHG

## 2021-01-27 DIAGNOSIS — S83.232D COMPLEX TEAR OF MEDIAL MENISCUS OF LEFT KNEE AS CURRENT INJURY, SUBSEQUENT ENCOUNTER: ICD-10-CM

## 2021-01-27 LAB
GLUCOSE BLD STRIP.AUTO-MCNC: 123 MG/DL (ref 70–110)
GLUCOSE BLD STRIP.AUTO-MCNC: 169 MG/DL (ref 70–110)

## 2021-01-27 PROCEDURE — 77030000032 HC CUF TRNQT ZIMM -B: Performed by: ORTHOPAEDIC SURGERY

## 2021-01-27 PROCEDURE — 74011250636 HC RX REV CODE- 250/636: Performed by: PHYSICIAN ASSISTANT

## 2021-01-27 PROCEDURE — 77030010509 HC AIRWY LMA MSK TELE -A: Performed by: NURSE ANESTHETIST, CERTIFIED REGISTERED

## 2021-01-27 PROCEDURE — 76210000006 HC OR PH I REC 0.5 TO 1 HR: Performed by: ORTHOPAEDIC SURGERY

## 2021-01-27 PROCEDURE — 77030022036 HC BLD SHV TOMCAT STRY -B: Performed by: ORTHOPAEDIC SURGERY

## 2021-01-27 PROCEDURE — 76210000021 HC REC RM PH II 0.5 TO 1 HR: Performed by: ORTHOPAEDIC SURGERY

## 2021-01-27 PROCEDURE — 74011250637 HC RX REV CODE- 250/637: Performed by: NURSE ANESTHETIST, CERTIFIED REGISTERED

## 2021-01-27 PROCEDURE — 77030039266 HC ADH SKN EXOFIN S2SG -A: Performed by: ORTHOPAEDIC SURGERY

## 2021-01-27 PROCEDURE — 74011250636 HC RX REV CODE- 250/636: Performed by: NURSE ANESTHETIST, CERTIFIED REGISTERED

## 2021-01-27 PROCEDURE — 29881 ARTHRS KNE SRG MNISECTMY M/L: CPT | Performed by: ORTHOPAEDIC SURGERY

## 2021-01-27 PROCEDURE — 82962 GLUCOSE BLOOD TEST: CPT

## 2021-01-27 PROCEDURE — 77030033005 HC TBNG ARTHSC PMP STRY -B: Performed by: ORTHOPAEDIC SURGERY

## 2021-01-27 PROCEDURE — 77030002933 HC SUT MCRYL J&J -A: Performed by: ORTHOPAEDIC SURGERY

## 2021-01-27 PROCEDURE — 76010000138 HC OR TIME 0.5 TO 1 HR: Performed by: ORTHOPAEDIC SURGERY

## 2021-01-27 PROCEDURE — 74011000250 HC RX REV CODE- 250: Performed by: NURSE ANESTHETIST, CERTIFIED REGISTERED

## 2021-01-27 PROCEDURE — 2709999900 HC NON-CHARGEABLE SUPPLY: Performed by: ORTHOPAEDIC SURGERY

## 2021-01-27 PROCEDURE — 74011000250 HC RX REV CODE- 250: Performed by: ORTHOPAEDIC SURGERY

## 2021-01-27 PROCEDURE — 74011250637 HC RX REV CODE- 250/637: Performed by: PHYSICIAN ASSISTANT

## 2021-01-27 PROCEDURE — 01400 ANES OPN/ARTHRS KNEE JT NOS: CPT | Performed by: NURSE ANESTHETIST, CERTIFIED REGISTERED

## 2021-01-27 PROCEDURE — 76060000032 HC ANESTHESIA 0.5 TO 1 HR: Performed by: ORTHOPAEDIC SURGERY

## 2021-01-27 PROCEDURE — 01400 ANES OPN/ARTHRS KNEE JT NOS: CPT | Performed by: ANESTHESIOLOGY

## 2021-01-27 PROCEDURE — 74011250636 HC RX REV CODE- 250/636

## 2021-01-27 PROCEDURE — 74011636637 HC RX REV CODE- 636/637: Performed by: NURSE ANESTHETIST, CERTIFIED REGISTERED

## 2021-01-27 RX ORDER — ONDANSETRON 2 MG/ML
4 INJECTION INTRAMUSCULAR; INTRAVENOUS ONCE
Status: COMPLETED | OUTPATIENT
Start: 2021-01-27 | End: 2021-01-27

## 2021-01-27 RX ORDER — ACETAMINOPHEN 500 MG
1000 TABLET ORAL ONCE
Status: COMPLETED | OUTPATIENT
Start: 2021-01-27 | End: 2021-01-27

## 2021-01-27 RX ORDER — SODIUM CHLORIDE, SODIUM LACTATE, POTASSIUM CHLORIDE, CALCIUM CHLORIDE 600; 310; 30; 20 MG/100ML; MG/100ML; MG/100ML; MG/100ML
50 INJECTION, SOLUTION INTRAVENOUS CONTINUOUS
Status: DISCONTINUED | OUTPATIENT
Start: 2021-01-27 | End: 2021-01-27 | Stop reason: HOSPADM

## 2021-01-27 RX ORDER — FENTANYL CITRATE 50 UG/ML
50 INJECTION, SOLUTION INTRAMUSCULAR; INTRAVENOUS AS NEEDED
Status: DISCONTINUED | OUTPATIENT
Start: 2021-01-27 | End: 2021-01-27 | Stop reason: HOSPADM

## 2021-01-27 RX ORDER — LIDOCAINE HYDROCHLORIDE 10 MG/ML
0.1 INJECTION, SOLUTION EPIDURAL; INFILTRATION; INTRACAUDAL; PERINEURAL AS NEEDED
Status: DISCONTINUED | OUTPATIENT
Start: 2021-01-27 | End: 2021-01-27 | Stop reason: HOSPADM

## 2021-01-27 RX ORDER — CEFAZOLIN SODIUM 2 G/50ML
2 SOLUTION INTRAVENOUS ONCE
Status: COMPLETED | OUTPATIENT
Start: 2021-01-27 | End: 2021-01-27

## 2021-01-27 RX ORDER — SODIUM CHLORIDE, SODIUM LACTATE, POTASSIUM CHLORIDE, CALCIUM CHLORIDE 600; 310; 30; 20 MG/100ML; MG/100ML; MG/100ML; MG/100ML
150 INJECTION, SOLUTION INTRAVENOUS CONTINUOUS
Status: DISCONTINUED | OUTPATIENT
Start: 2021-01-27 | End: 2021-01-27 | Stop reason: HOSPADM

## 2021-01-27 RX ORDER — KETAMINE HCL 50MG/ML(1)
SYRINGE (ML) INTRAVENOUS AS NEEDED
Status: DISCONTINUED | OUTPATIENT
Start: 2021-01-27 | End: 2021-01-27 | Stop reason: HOSPADM

## 2021-01-27 RX ORDER — PROPOFOL 10 MG/ML
INJECTION, EMULSION INTRAVENOUS AS NEEDED
Status: DISCONTINUED | OUTPATIENT
Start: 2021-01-27 | End: 2021-01-27 | Stop reason: HOSPADM

## 2021-01-27 RX ORDER — PHENYLEPHRINE HCL IN 0.9% NACL 1 MG/10 ML
SYRINGE (ML) INTRAVENOUS AS NEEDED
Status: DISCONTINUED | OUTPATIENT
Start: 2021-01-27 | End: 2021-01-27 | Stop reason: HOSPADM

## 2021-01-27 RX ORDER — ONDANSETRON 2 MG/ML
INJECTION INTRAMUSCULAR; INTRAVENOUS
Status: COMPLETED
Start: 2021-01-27 | End: 2021-01-27

## 2021-01-27 RX ORDER — FAMOTIDINE 20 MG/1
20 TABLET, FILM COATED ORAL ONCE
Status: COMPLETED | OUTPATIENT
Start: 2021-01-27 | End: 2021-01-27

## 2021-01-27 RX ORDER — BUPIVACAINE HYDROCHLORIDE 5 MG/ML
INJECTION, SOLUTION EPIDURAL; INTRACAUDAL AS NEEDED
Status: DISCONTINUED | OUTPATIENT
Start: 2021-01-27 | End: 2021-01-27 | Stop reason: HOSPADM

## 2021-01-27 RX ORDER — ACETAMINOPHEN 500 MG
1000 TABLET ORAL ONCE
Status: DISCONTINUED | OUTPATIENT
Start: 2021-01-27 | End: 2021-01-27 | Stop reason: HOSPADM

## 2021-01-27 RX ORDER — DEXAMETHASONE SODIUM PHOSPHATE 4 MG/ML
INJECTION, SOLUTION INTRA-ARTICULAR; INTRALESIONAL; INTRAMUSCULAR; INTRAVENOUS; SOFT TISSUE AS NEEDED
Status: DISCONTINUED | OUTPATIENT
Start: 2021-01-27 | End: 2021-01-27 | Stop reason: HOSPADM

## 2021-01-27 RX ORDER — LIDOCAINE HYDROCHLORIDE 20 MG/ML
INJECTION, SOLUTION EPIDURAL; INFILTRATION; INTRACAUDAL; PERINEURAL AS NEEDED
Status: DISCONTINUED | OUTPATIENT
Start: 2021-01-27 | End: 2021-01-27 | Stop reason: HOSPADM

## 2021-01-27 RX ORDER — SODIUM CHLORIDE 0.9 % (FLUSH) 0.9 %
5-40 SYRINGE (ML) INJECTION EVERY 8 HOURS
Status: DISCONTINUED | OUTPATIENT
Start: 2021-01-27 | End: 2021-01-27 | Stop reason: HOSPADM

## 2021-01-27 RX ORDER — INSULIN LISPRO 100 [IU]/ML
INJECTION, SOLUTION INTRAVENOUS; SUBCUTANEOUS ONCE
Status: COMPLETED | OUTPATIENT
Start: 2021-01-27 | End: 2021-01-27

## 2021-01-27 RX ORDER — HYDROMORPHONE HYDROCHLORIDE 2 MG/ML
0.5 INJECTION, SOLUTION INTRAMUSCULAR; INTRAVENOUS; SUBCUTANEOUS
Status: DISCONTINUED | OUTPATIENT
Start: 2021-01-27 | End: 2021-01-27 | Stop reason: HOSPADM

## 2021-01-27 RX ORDER — CEFAZOLIN SODIUM 2 G/50ML
2 SOLUTION INTRAVENOUS ONCE
Status: DISCONTINUED | OUTPATIENT
Start: 2021-01-27 | End: 2021-01-27 | Stop reason: HOSPADM

## 2021-01-27 RX ORDER — SODIUM CHLORIDE 0.9 % (FLUSH) 0.9 %
5-40 SYRINGE (ML) INJECTION AS NEEDED
Status: DISCONTINUED | OUTPATIENT
Start: 2021-01-27 | End: 2021-01-27 | Stop reason: HOSPADM

## 2021-01-27 RX ORDER — MIDAZOLAM HYDROCHLORIDE 1 MG/ML
INJECTION, SOLUTION INTRAMUSCULAR; INTRAVENOUS AS NEEDED
Status: DISCONTINUED | OUTPATIENT
Start: 2021-01-27 | End: 2021-01-27 | Stop reason: HOSPADM

## 2021-01-27 RX ORDER — ONDANSETRON 2 MG/ML
INJECTION INTRAMUSCULAR; INTRAVENOUS AS NEEDED
Status: DISCONTINUED | OUTPATIENT
Start: 2021-01-27 | End: 2021-01-27 | Stop reason: HOSPADM

## 2021-01-27 RX ORDER — HYDROMORPHONE HYDROCHLORIDE 2 MG/ML
INJECTION, SOLUTION INTRAMUSCULAR; INTRAVENOUS; SUBCUTANEOUS AS NEEDED
Status: DISCONTINUED | OUTPATIENT
Start: 2021-01-27 | End: 2021-01-27 | Stop reason: HOSPADM

## 2021-01-27 RX ADMIN — MIDAZOLAM HYDROCHLORIDE 2 MG: 2 INJECTION, SOLUTION INTRAMUSCULAR; INTRAVENOUS at 11:32

## 2021-01-27 RX ADMIN — Medication 200 MCG: at 12:16

## 2021-01-27 RX ADMIN — ONDANSETRON 4 MG: 2 INJECTION INTRAMUSCULAR; INTRAVENOUS at 11:48

## 2021-01-27 RX ADMIN — LIDOCAINE HYDROCHLORIDE 100 MG: 20 INJECTION, SOLUTION EPIDURAL; INFILTRATION; INTRACAUDAL; PERINEURAL at 11:39

## 2021-01-27 RX ADMIN — PROPOFOL 150 MG: 10 INJECTION, EMULSION INTRAVENOUS at 11:39

## 2021-01-27 RX ADMIN — Medication 100 MCG: at 11:53

## 2021-01-27 RX ADMIN — FAMOTIDINE 20 MG: 20 TABLET, FILM COATED ORAL at 10:06

## 2021-01-27 RX ADMIN — ACETAMINOPHEN 1000 MG: 500 TABLET ORAL at 10:06

## 2021-01-27 RX ADMIN — ONDANSETRON 4 MG: 2 INJECTION INTRAMUSCULAR; INTRAVENOUS at 13:00

## 2021-01-27 RX ADMIN — INSULIN LISPRO 3 UNITS: 100 INJECTION, SOLUTION INTRAVENOUS; SUBCUTANEOUS at 10:06

## 2021-01-27 RX ADMIN — HYDROMORPHONE HYDROCHLORIDE 0.5 MG: 2 INJECTION, SOLUTION INTRAMUSCULAR; INTRAVENOUS; SUBCUTANEOUS at 12:06

## 2021-01-27 RX ADMIN — SODIUM CHLORIDE, SODIUM LACTATE, POTASSIUM CHLORIDE, AND CALCIUM CHLORIDE 50 ML/HR: 600; 310; 30; 20 INJECTION, SOLUTION INTRAVENOUS at 10:06

## 2021-01-27 RX ADMIN — SODIUM CHLORIDE, SODIUM LACTATE, POTASSIUM CHLORIDE, AND CALCIUM CHLORIDE: 600; 310; 30; 20 INJECTION, SOLUTION INTRAVENOUS at 12:00

## 2021-01-27 RX ADMIN — Medication 100 MCG: at 11:58

## 2021-01-27 RX ADMIN — DEXAMETHASONE SODIUM PHOSPHATE 8 MG: 4 INJECTION, SOLUTION INTRAMUSCULAR; INTRAVENOUS at 12:06

## 2021-01-27 RX ADMIN — CEFAZOLIN 2 G: 10 INJECTION, POWDER, FOR SOLUTION INTRAVENOUS at 11:50

## 2021-01-27 RX ADMIN — Medication 25 MG: at 11:58

## 2021-01-27 NOTE — OP NOTES
Operative Report    Patient: Jorge Luis Lofton MRN: [id number]  SSN: [id number]    YOB: 1960  Age: 61 y.o. Sex: female       Date of Surgery: 1/27/2021     Preoperative Diagnosis: Tear of medial meniscus of left knee, current, unspecified tear type, initial encounter [S83.242A]  Primary osteoarthritis of left knee [M17.12]     Postoperative Diagnosis: Tear of medial meniscus of left knee, current, unspecified tear type, initial encounter [S83.242A]  Primary osteoarthritis of left knee [M17.12]     Surgeon(s) and Role:     Lizz Dubois MD - Primary    Anesthesia: General     Procedure: Procedure(s):  LEFT KNEE ARTHROSCOPIC PARTIAL MEDIAL MENISCECTOMY     Procedure in Detail: Patient was taken to the operating room induced under general trach anesthesia with anesthesia staff. Placed in a standard arthroscopy rider with the left leg prepped with ChloraPrep solution draped free sterile field. Anterolateral portal was used arthroscopy portal anteromedial portals were portal. Portals were made with 11 blade followed by blunt trochars. Once the arthroscope was in place the knee was systematically evaluated sinus suprapatellar pouch patellofemoral joint with mild degenerative changes were noted in. The medial compartment a complex tear of the posterior third of the medial meniscus was found which was debrided using straight baskets refried three five shaver leaving a stable rim. Diffuse degenerative changes noted in the medial compartment with areas down to grade 4 changes. Anterior crucial ligament was intact and the lateral compartment was pristine with no evidence of tears. The articular surface was intact. The fluid was suctioned out the knee was injected with Marcaine. Portals were closed with 4-0 Monocryl. Sterile dressings were applied patient tolerated procedure well was taken to recovery room without problems.       Estimated Blood Loss: Minimal    Tourniquet Time: * Missing tourniquet times found for documented tourniquets in lo *      Implants: * No implants in log *            Specimens: * No specimens in log *        Drains: None                Complications: None    Counts: Sponge and needle counts were correct times two.     Signed By:  Peng Martinez MD     2021

## 2021-01-27 NOTE — BRIEF OP NOTE
Brief Postoperative Note    Patient: Les Aquino  YOB: 1960  MRN: [id number]    Date of Procedure: 1/27/2021     Pre-Op Diagnosis: Tear of medial meniscus of left knee, current, unspecified tear type, initial encounter [S83.242A]  Primary osteoarthritis of left knee [M17.12]    Post-Op Diagnosis: Same as preoperative diagnosis.       Procedure(s):  LEFT KNEE ARTHROSCOPIC PARTIAL MEDIAL MENISCECTOMY    Surgeon(s):  Ashley Gorman MD    Surgical Assistant: Surg Asst-1: Hao ALMARAZ    Anesthesia: General     Estimated Blood Loss (mL): Minimal    Complications: None    Specimens: * No specimens in log *     Implants: * No implants in log *    Drains: * No LDAs found *    Findings: As above    Electronically Signed by Marjan Callaway MD on 1/27/2021 at 12:11 PM

## 2021-01-27 NOTE — ANESTHESIA PREPROCEDURE EVALUATION
Relevant Problems   No relevant active problems       Anesthetic History               Review of Systems / Medical History  Patient summary reviewed and pertinent labs reviewed    Pulmonary  Within defined limits                 Neuro/Psych     seizures: well controlled    Headaches    Comments: Chronic pain Cardiovascular              Hyperlipidemia         GI/Hepatic/Renal  Within defined limits              Endo/Other    Diabetes: well controlled, type 2, using insulin  Hypothyroidism  Obesity and arthritis    Comments: Insulin pump Other Findings              Physical Exam    Airway  Mallampati: II  TM Distance: 4 - 6 cm  Neck ROM: normal range of motion   Mouth opening: Normal     Cardiovascular    Rhythm: regular  Rate: normal         Dental    Dentition: Poor dentition  Comments: Multiple missing teeth   Pulmonary                Comments: Non labored Abdominal  GI exam deferred       Other Findings            Anesthetic Plan    ASA: 3  Anesthesia type: MAC            Anesthetic plan and risks discussed with: Patient

## 2021-01-27 NOTE — DISCHARGE INSTRUCTIONS
Dr. Nicolette Angeles Knee Arthroscopy  Postoperative Information    You will be given a prescription for pain medication. It may be taken every 4-6 hours as needed for the first 4-5 days. You may elevate your leg and place an ice pack on top of the dressing in order to prevent swelling. A soft bandage was placed on your knee to soak up blood and fluid. You may take the bandage off two days after surgery. It is safe to take a shower two days after surgery. You may have a clear bandage on your incisions that looks like tape. This is surgical superglue. Leave these on unless you are noticing redness or itching. Band-Aids should be used for the first 4-5 days over each incision. There are 2 small incisions in your knee that may be sore and develop bruising over the next several days. This should resolve over the next few weeks. No special care will be needed. You should expect swelling in the area. You may elevate your leg and apply an icepack on top of the dressing to help minimize the swelling. Deep massage to the lower leg may also be utilized. You may begin bearing weight on your leg with the use of crutches for the first 4-5 days. Apply as much weight as tolerated so that at the end of 4-5 days, you can walk without crutches. Avoid prolonged walking or standing during the first few weeks after surgery. During your first week please work on gentle range of motion of your knee. Even though your incisions are small, there has been an operation inside and around the knee joint. Complete healing may take several months. If you have a high temperature, unexpected pain, redness or swelling, or any drainage around your knee area, please call my office immediately. Please make an appointment to return to my office in one week.     Dr. Nicolette Angeles office number [phone]      DISCHARGE SUMMARY from Nurse  PATIENT INSTRUCTIONS:  After general anesthesia or intravenous sedation, for 24 hours or while taking prescription Narcotics:  · Limit your activities  · Do not drive and operate hazardous machinery  · Do not make important personal or business decisions  · Do  not drink alcoholic beverages  · If you have not urinated within 8 hours after discharge, please contact your surgeon on call. Report the following to your surgeon:  · Excessive pain, swelling, redness or odor of or around the surgical area  · Temperature over 100.5  · Nausea and vomiting lasting longer than 4 hours or if unable to take medications  · Any signs of decreased circulation or nerve impairment to extremity: change in color, persistent  numbness, tingling, coldness or increase pain  · Any questions    What to do at Home:  Recommended activity: Activity as tolerated and no driving for today. *  Please give a list of your current medications to your Primary Care Provider. *  Please update this list whenever your medications are discontinued, doses are      changed, or new medications (including over-the-counter products) are added. *  Please carry medication information at all times in case of emergency situations. These are general instructions for a healthy lifestyle:    No smoking/ No tobacco products/ Avoid exposure to second hand smoke  Surgeon General's Warning:  Quitting smoking now greatly reduces serious risk to your health. Obesity, smoking, and sedentary lifestyle greatly increases your risk for illness    A healthy diet, regular physical exercise & weight monitoring are important for maintaining a healthy lifestyle    You may be retaining fluid if you have a history of heart failure or if you experience any of the following symptoms:  Weight gain of 3 pounds or more overnight or 5 pounds in a week, increased swelling in our hands or feet or shortness of breath while lying flat in bed. Please call your doctor as soon as you notice any of these symptoms; do not wait until your next office visit.   The discharge information has been reviewed with the patient. The patient verbalized understanding. Discharge medications reviewed with the patient and appropriate educational materials and side effects teaching were provided. ___________________________________________________________________________________________________________________________________      Patient Education        Meniscus Surgery: What to Expect at Home  Your Recovery  Meniscus surgery removes or fixes the cartilage (meniscus) between the bones in the knee. Each knee has two of these rubbery pads of cartilage, one on either side. Meniscus repair is usually done with arthroscopic surgery. Your doctor put a lighted tube--called an arthroscope or scope--and other surgical tools through small cuts (incisions) in your knee. The incisions leave scars that usually fade in time. You will feel tired for several days. Your knee will be swollen. And you may have numbness around the cuts the doctor made (incisions) on your knee. You can put ice on the knee to reduce swelling. Most of this will go away in a few days. You should soon start seeing improvement in your knee. You may be able to return to most of your regular activities within a few weeks. But it will be several months before you have complete use of your knee. It may take as long as 6 months before your knee is strong enough for hard physical work or certain sports. You will need to build your strength and the motion of your joint with rehabilitation (rehab) exercises. In time, your knee will likely be stronger and more stable than it was before the surgery. How soon you can return to sports or exercise depends on how well you follow your rehab program and how well your knee heals. Your doctor or physical therapist will give you an idea of when you can return to these activities. If you had a partial meniscectomy, you might be able to play sports in about 4 to 6 weeks.  If you had meniscus repair, it may be 3 to 6 months before you can play sports. This care sheet gives you a general idea about how long it will take for you to recover. But each person recovers at a different pace. Follow the steps below to get better as quickly as possible. How can you care for yourself at home? Activity    · Rest when you feel tired. Getting enough sleep will help you recover. Sleep with your knee raised, but not bent. Put a pillow under your foot.     · Keep your leg raised as much as possible for the first few days.     · You may shower 24 to 48 hours after surgery, if your doctor okays it. When you shower, keep your bandage and incisions dry by taping a sheet of plastic to cover them. If you have a brace, take it off if your doctor says it is okay. It might help to sit on a shower stool.     · You will be able to stand if you have a brace or use crutches. Do not put weight on your leg until your doctor says you can. You can move around the house to do daily tasks.     · If you have a brace, leave it on except when you exercise your knee or you shower. Be careful not to put the brace on too tight. You will use it for about 2 to 6 weeks.     · If your doctor does not want you to shower or remove your brace, you can take a sponge bath.     · Wait 2 weeks or until your doctor says it is okay before you take a bath, swim, use a hot tub, or soak your leg.     · You can drive when you are no longer using crutches or a knee brace, are no longer taking prescription pain medicine, and have some control over your knee. This usually takes 1 to 2 weeks.     · How soon you can return to work depends on your job. If you sit at work, you may be able to go back in 1 to 2 weeks. But if you are on your feet at work, it may take 4 to 6 weeks. If you are very physically active in your job, it may take 3 to 6 months. Diet    · You can eat your normal diet.  If your stomach is upset, try bland, low-fat foods like plain rice, broiled chicken, toast, and yogurt. Drink plenty of fluids.     · You may notice that your bowel movements are not regular right after your surgery. This is common. Try to avoid constipation and straining with bowel movements. You may want to take a fiber supplement every day. If you have not had a bowel movement after a couple of days, ask your doctor about taking a mild laxative. Medicines    · Your doctor will tell you if and when you can restart your medicines. He or she will also give you instructions about taking any new medicines.     · If you take aspirin or some other blood thinner, ask your doctor if and when to start taking it again. Make sure that you understand exactly what your doctor wants you to do.     · Be safe with medicines. Take pain medicines exactly as directed. ? If the doctor gave you a prescription medicine for pain, take it as prescribed. ? If you are not taking a prescription pain medicine, ask your doctor if you can take an over-the-counter medicine.     · If your doctor prescribed antibiotics, take them as directed. Do not stop taking them just because you feel better. You need to take the full course of antibiotics.     · If you think your pain medicine is making you sick to your stomach:  ? Take your medicine after meals (unless your doctor has told you not to). ? Ask your doctor for a different pain medicine. Incision care    · If you have a bandage over your incisions, keep the bandage clean and dry. Follow your doctor's instructions. Some doctors want to see you before you take it off, while others may let you take it off 48 to 72 hours after your surgery.     · If you have strips of tape on the incisions, leave the tape on for a week or until it falls off. Keep the area clean and dry. Exercise    · Rehab exercises are an important part of your treatment. Your first exercises will help you improve your knee's movement and regain your muscle strength.    Ice and elevation    · To reduce swelling and pain, put ice or a cold pack on your knee for 10 to 20 minutes at a time. Do this every few hours. Put a thin cloth between the ice and your skin.     · For 3 days after surgery, prop up the sore leg on a pillow when you ice it or anytime you sit or lie down. Try to keep it above the level of your heart. This will help reduce swelling.     · If your doctor gave you support stockings, wear them as long as he or she tells you to. These help prevent blood clots. Follow-up care is a key part of your treatment and safety. Be sure to make and go to all appointments, and call your doctor if you are having problems. It's also a good idea to know your test results and keep a list of the medicines you take. When should you call for help? Call 911 anytime you think you may need emergency care. For example, call if:    · You passed out (lost consciousness).     · You have severe trouble breathing.     · You have sudden chest pain and shortness of breath, or you cough up blood. Call your doctor now or seek immediate medical care if:    · You have pain that does not go away after you take pain medicine.     · You have loose stitches, or your incisions come open.     · Bright red blood has soaked through the bandage over your incision.     · You have signs of infection, such as:  ? Increased pain, swelling, warmth, or redness. ? Red streaks leading from the incision. ? Pus draining from the incision. ? Swollen lymph nodes in your neck, armpits, or groin. ? A fever.     · You have signs of a blood clot, such as:  ? Pain in your calf, back of the knee, thigh, or groin. ? Redness and swelling in your leg or groin.    Watch closely for any changes in your health, and be sure to contact your doctor if:    · You feel a catching or locking in your knee.     · You are sick to your stomach or cannot keep fluids down.     · You have swelling, tingling, pain, or numbness in your toes that does not go away when you raise your knee above the level of your heart.     · You do not have a bowel movement after taking a laxative. Where can you learn more? Go to http://www.gray.com/  Enter H324 in the search box to learn more about \"Meniscus Surgery: What to Expect at Home. \"  Current as of: March 2, 2020               Content Version: 12.6  © 5691-4706 [employer]. Care instructions adapted under license by [employer] (which disclaims liability or warranty for this information). If you have questions about a medical condition or this instruction, always ask your healthcare professional. [place] [address] any warranty or liability for your use of this information.

## 2021-01-27 NOTE — ANESTHESIA POSTPROCEDURE EVALUATION
Procedure(s):  LEFT KNEE ARTHROSCOPIC PARTIAL MEDIAL MENISCECTOMY. general    Anesthesia Post Evaluation      Multimodal analgesia: multimodal analgesia used between 6 hours prior to anesthesia start to PACU discharge  Patient location during evaluation: PACU  Patient participation: complete - patient participated  Level of consciousness: awake and alert  Pain management: adequate  Airway patency: patent  Anesthetic complications: no  Cardiovascular status: acceptable  Respiratory status: acceptable  Hydration status: acceptable  Post anesthesia nausea and vomiting:  controlled  Final Post Anesthesia Temperature Assessment:  Normothermia (36.0-37.5 degrees C)      INITIAL Post-op Vital signs:   Vitals Value Taken Time   /70 01/27/21 1302   Temp 36.6 °C (97.9 °F) 01/27/21 1230   Pulse 84 01/27/21 1304   Resp 12 01/27/21 1304   SpO2 94 % 01/27/21 1304   Vitals shown include unvalidated device data.

## 2021-01-27 NOTE — PROGRESS NOTES
Date of Surgery Update:  Amandeep Clements was seen and examined. History and physical has been reviewed. The patient has been examined. There have been no significant clinical changes since the completion of the originally dated History and Physical.    Signed By: Krishan Au MD     January 27, 2021 11:02 AM       The above patient was independently seen and examined by myself. The case was then discussed and a proper diagnosis/plan was made. I agree with the above assessment.

## 2021-02-03 ENCOUNTER — OFFICE VISIT (OUTPATIENT)
Dept: ORTHOPEDIC SURGERY | Age: 61
End: 2021-02-03
Payer: MEDICAID

## 2021-02-03 VITALS
WEIGHT: 176 LBS | SYSTOLIC BLOOD PRESSURE: 120 MMHG | TEMPERATURE: 96.2 F | HEIGHT: 64 IN | DIASTOLIC BLOOD PRESSURE: 72 MMHG | HEART RATE: 104 BPM | BODY MASS INDEX: 30.05 KG/M2 | OXYGEN SATURATION: 100 %

## 2021-02-03 DIAGNOSIS — S83.242D TEAR OF MEDIAL MENISCUS OF LEFT KNEE, CURRENT, UNSPECIFIED TEAR TYPE, SUBSEQUENT ENCOUNTER: Primary | ICD-10-CM

## 2021-02-03 DIAGNOSIS — M17.12 PRIMARY OSTEOARTHRITIS OF LEFT KNEE: ICD-10-CM

## 2021-02-03 PROCEDURE — 99024 POSTOP FOLLOW-UP VISIT: CPT | Performed by: PHYSICIAN ASSISTANT

## 2021-02-03 NOTE — PROGRESS NOTES
Patient: Francisco Mesa  YOB: 1960       HISTORY:  The patient presents for reevaluation of her left knee status post arthroscopic partial medial meniscectomy with degenerative arthritis on 1/27/2021. Patient states pain is a 5 out of 10.  she has not gone to physical therapy. Pt describes a sharp, \"lingering\" pain today. Does note that her  fell recently and had to help him. Patient denies any fever, chills, chest pain, shortness of breath or calf pain. There are no new illness or injuries to report since last seen in the office. Pain Assessment  2/3/2021   Location of Pain Knee   Location Modifiers Left   Severity of Pain 5   Quality of Pain Aching; Sharp   Duration of Pain Persistent   Frequency of Pain Constant   Date Pain First Started -   Aggravating Factors Standing   Aggravating Factors Comment -   Limiting Behavior Some   Relieving Factors Rest;NSAID;Other (Comment)   Relieving Factors Comment tylenol   Result of Injury Yes   Work-Related Injury No   Type of Injury Other (Comment)   Type of Injury Comment twist knee       PHYSICAL EXAMINATION:    Visit Vitals  /72 (BP 1 Location: Right upper arm, BP Patient Position: Sitting, BP Cuff Size: Large adult)   Pulse (!) 104   Temp (!) 96.2 °F (35.7 °C) (Skin)   Ht 5' 4\" (1.626 m)   Wt 176 lb (79.8 kg)   SpO2 100%   BMI 30.21 kg/m²     The patient is a well-developed, well-nourished female in no acute distress. The patient is alert and oriented times three. The patient appears to be well groomed. Mood and affect are normal.   ORTHOPEDIC EXAM of Left knee: Inspection: Effusion present,  incisions clean, dry intact, sutures in place  TTP: medial joint  Range of motion: 5-110 flexion  Stability: Stable  Strength: 5/5  2+ distal pulses      IMPRESSION:  Status post Left knee arthroscopic partial medial meniscectomy with degenerative arthritis   Encounter Diagnoses   Name Primary?     Tear of medial meniscus of left knee, current, unspecified tear type, subsequent encounter Yes    Primary osteoarthritis of left knee            PLAN: Pt presents  status post left knee arthroscopic partial medial meniscectomy with degenerative arthritis on 1/27/2021. Surgery was discussed at length today. Incisions cleaned and Steri-Strips were applied. Can start scar massage next week. Stressed no increases in pain or swelling. Will set up with PT. Patient is weight bearing as tolerated. OK to d/c use of crutches/walker. Patient does not request refill of pain medication. Patient will follow up in 3 weeks.       RTC 3 weeks      Scribed by Aman  Aakash Gutierrez) as dictated by Romayne Kicks, PA-C Romayne Kicks, PA-C [facility]

## 2021-02-11 ENCOUNTER — HOSPITAL ENCOUNTER (OUTPATIENT)
Dept: PHYSICAL THERAPY | Age: 61
Discharge: HOME | End: 2021-02-11
Payer: MEDICAID

## 2021-02-11 PROCEDURE — 97161 PT EVAL LOW COMPLEX 20 MIN: CPT

## 2021-02-11 NOTE — PROGRESS NOTES
PT DAILY TREATMENT NOTE/KNEE EVAL     Patient Name: Francheska Marquez  Date:2021  : 1960  [x]  Patient  Verified  Payor: [facility] MEDICAID / Plan: [facility] / Product Type: Managed Care Medicaid /    In time:4:30  Out time:5:03  Total Treatment Time (min): 33  Visit #: 1     Treatment Area: Pain in left knee [M25.562]  Unilateral primary osteoarthritis, left knee [M17.12]  Other tear of medial meniscus, current injury, left knee, subsequent encounter [F68.250U]    SUBJECTIVE  Pain Level (0-10 scale): 7/10  []constant []intermittent []improving []worsening []no change since onset    Any medication changes, allergies to medications, adverse drug reactions, diagnosis change, or new procedure performed?: [x] No    [] Yes (see summary sheet for update)    Subjective functional status/changes:    Pt is a 60 y/o female that presents with left knee pain following a menisectomy on 21. Pt reports her largest problem is standing and walking for long periods of time d/t increase in pain around the incision point and wrapping around to the back side. Pt lives in a 1 story home that has 4 [address] with no HR. Pt reports the stairs are trouble but she feels confident going up them. Pt PLOF was independent but pt stated her pain was much worse before surgery. 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 He has also had several recent complications with his health that has caused her added stress. Pt wants to increase standing tolerance, increase walking tolerance and decrease pain in the knee. Pt reported having epilepsy in her medical chart but stated she has not had a seizure in over 4 years.    OBJECTIVE/EXAMINATION      33 min [x]Eval                  []Re-Eval        With   [] TE   [] TA   [] neuro   [] other: Patient Education: [x] Review HEP    [] Progressed/Changed HEP based on:   [] positioning   [] body mechanics   [] transfers   [] heat/ice application    [] other: Other Objective/Functional Measures:     Physical Therapy Evaluation - Knee    Posture: [] Varus    [] Valgus    [] Recurvatum        [] Tibial Torsion    [] Foot Supination    [] Foot Pronation    Describe:    Gait:  [] Normal    [] Abnormal    [x] Antalgic    [] NWB    Device: n/a    Describe:slight limp. ROM / Strength  [] Unable to assess                  AROM                      PROM                   Strength (1-5)    Left Right Left Right Left Right   Hip Flexion     3 5    Extension     2 5    Abduction     4 5    Adduction     2 5   Knee Flexion 100 125   2 5    Extension 2 0   2 5   Ankle Plantarflexion     3 3    Dorsiflexion     5 5       Flexibility: [] Unable to assess at this time  Hamstrings:    (L) Tightness= [] WNL   [x] Min   [] Mod   [] Severe    (R) Tightness= [] WNL   [x] Min   [] Mod   [] Severe  Quadriceps:    (L) Tightness= [] WNL   [] Min   [] Mod   [x] Severe    (R) Tightness= [] WNL   [x] Min   [] Mod   [] Severe  Gastroc:      (L) Tightness= [] WNL   [] Min   [] Mod   [] Severe    (R) Tightness= [] WNL   [] Min   [] Mod   [] Severe  Other:    Palpation:   Neg/Pos  Neg/Pos  Neg/Pos   Joint Line pos Quad tendon  Patellar ligament    Patella pos Fibular head  Pes Anserinus    Tibial tubercle  Hamstring tendons  Infrapatellar fat pad      Optional Tests:  Patellar Positioning (Static)   [x]L [x]R Normal []L []R Lateral   []L []R Sherie Genera      []L []R Medial   []L []R [place]    Patellar Tracking   []L []R Glide (Lat)   []L []R Tilt (Lat)     []L []R Glide (Med)  []L []R Tilt (Med)      []L []R Tile (Inf)     Patellar Mobility   []L []R Hypermobile []L []R Hypomobile         Girth Measurements:     Cm at  Cm above joint line   Cm at   Cm below joint line  Cm at joint line   Left 43 4cm above p 0 36    Right  41 4cm above p 0 33               Other tests/comments:  30 sec sit to stand: 8 sec  SLS: 4 sec on the left, 30 sec on the right.        Pain Level (0-10 scale) post treatment: 7/10    ASSESSMENT/Changes in Function:    [x]  See Plan of Care  []  See progress note/recertification  []  See Discharge Summary         Progress towards goals / Updated goals:  See POC    PLAN  []  Upgrade activities as tolerated     [x]  Continue plan of care  []  Update interventions per flow sheet       []  Discharge due to:_  []  Other:_      Waqas Mitchell 2/11/2021  4:14 PM

## 2021-02-11 NOTE — PROGRESS NOTES
[facility]  [address] [place] [address]  [phone] fax    Plan of Care/ Statement of Necessity for Physical Therapy Services    Patient name: Isael Macdonald Start of Care: 2021   Referral source: Juan Murray,* : 1960    Medical Diagnosis: Pain in left knee [M25.562]  Unilateral primary osteoarthritis, left knee [M17.12]  Other tear of medial meniscus, current injury, left knee, subsequent encounter [Y74.894W]  Payor: [facility] MEDICAID / Plan: [facility] / Product Type: Managed Care Medicaid /  Onset Date:21 menisectomy     Treatment Diagnosis: Left knee pain    Prior Hospitalization: see medical history Provider#: [id number]   Medications: Verified on Patient summary List    Comorbidities: Epilepsy, diabetes, arthritis, thyroid problems. 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 The Plan of Care and following information is based on the information from the initial evaluation. Assessment/ key information:   Pt is a 60 y/o female presenting today with left knee pain secondary to a menisectomy on 21. Pt is independent with all ADL's and is ambulating w/o an AD but does have a antalgic gait pattern. Pt is the primary caregiver for her  that has parkinson's diease and has had recent health concerns. Overall pt showed great LE strength with the only exception to left knee extension and flexion, and hip abduction/adduction being weaker than contralateral side. These movements were limited by pain. Pt was able to score 8 reps on the 30 sec sit to stand test reporting fatigue and increase in pain following. Pt was about to hold 30 sec SLS on right side by only 4 sec on left side limited by pain and concern to injure knee.  Pt will benefit from skilled physical therapy services to increase LE strength, ROM, and balance in single limb stance to to improve pt overall quality of life and prevent further harm. Evaluation Complexity History HIGH Complexity :3+ comorbidities / personal factors will impact the outcome/ POC ; Examination LOW Complexity : 1-2 Standardized tests and measures addressing body structure, function, activity limitation and / or participation in recreation  ;Presentation LOW Complexity : Stable, uncomplicated  ;Clinical Decision Making MEDIUM Complexity : FOTO score of 26-74  Overall Complexity Rating: LOW   Problem List: pain affecting function, decrease ROM, decrease strength, impaired gait/ balance, decrease ADL/ functional abilitiies and decrease activity tolerance   Treatment Plan may include any combination of the following: Therapeutic exercise, Therapeutic activities, Neuromuscular re-education, Gait/balance training, Manual therapy and Functional mobility training  Patient / Family readiness to learn indicated by: asking questions and trying to perform skills  Persons(s) to be included in education: patient (P)  Barriers to Learning/Limitations: None  Patient Goal (s): increase ability to walk and stand for longer periods of time  Patient Self Reported Health Status: good  Rehabilitation Potential: good    Short Term Goals: To be accomplished in 1 weeks:   1. Pt will be 100% compliant with HEP to increase overall level of function. Long Term Goals: To be accomplished in 4 weeks:   1. Pt will increase FOTO score by 12 points to show increase in function and improved quality of life. 2. Pt will increase AROM knee flexion to 125* to match uninvolved side to improve pt ability to ambulate and participate in activities of choice. 3. Pt will increase Left SLS to >10 seconds to decrease falls risk   4. Pt will increase 30 sec STS test to >11 reps to show improved functional mobility. Frequency / Duration: Patient to be seen 2-3 times per week for 4 weeks.     Patient/ Caregiver education and instruction: Diagnosis, prognosis, self care   [x]  Plan of care has been reviewed with WIN Loza 2/11/2021 5:12 PM    ________________________________________________________________________    I certify that the above Therapy Services are being furnished while the patient is under my care. I agree with the treatment plan and certify that this therapy is necessary.     [address] [place] [address] Signature:____________Date:_________TIME:________     [facility] Crum,*  ** Signature, Date and Time must be completed for valid certification **    Please sign and return to [facility]  [address] [facility]  [phone] fax

## 2021-02-17 ENCOUNTER — HOSPITAL ENCOUNTER (OUTPATIENT)
Dept: PHYSICAL THERAPY | Age: 61
Discharge: HOME | End: 2021-02-17
Payer: MEDICAID

## 2021-02-17 PROCEDURE — 97530 THERAPEUTIC ACTIVITIES: CPT

## 2021-02-17 PROCEDURE — 97110 THERAPEUTIC EXERCISES: CPT

## 2021-02-17 NOTE — PROGRESS NOTES
PT DAILY TREATMENT NOTE     Patient Name: Mason Calixto  Date:2021  : 1960  [x]  Patient  Verified  Payor: [facility] MEDICAID / Plan: [facility] / Product Type: Managed Care Medicaid /    In time:12:45 PM  Out time:1:29 PM  Total Treatment Time (min): 44  Visit #: 1       Treatment Area: Pain in left knee [M25.562]  Unilateral primary osteoarthritis, left knee [M17.12]  Other tear of medial meniscus, current injury, left knee, subsequent encounter [M05.712C]    SUBJECTIVE  Pain Level (0-10 scale): 5  Any medication changes, allergies to medications, adverse drug reactions, diagnosis change, or new procedure performed?: [x] No    [] Yes (see summary sheet for update)  Subjective functional status/changes:   [] No changes reported  No difficulty since initial evaluation, has been completing HEP    OBJECTIVE    28 min Therapeutic Exercise:  [x] See flow sheet :   Rationale: increase ROM and increase strength to improve the patients ability to perform ADLs with greater ease. 10 min Therapeutic Activity:  [x]  See flow sheet : step ups, TRX squats   Rationale: increase strength and improve coordination  to improve the patients ability to navigate stairs and uneven surfaces with improved stability and safety. With   [] TE   [] TA   [] neuro   [] other: Patient Education: [x] Review HEP    [] Progressed/Changed HEP based on:   [] positioning   [] body mechanics   [] transfers   [] heat/ice application    [] other:      Other Objective/Functional Measures: Exercise program initiated per flow sheet. FOTO score 31     Pain Level (0-10 scale) post treatment: 7    ASSESSMENT/Changes in Function: Demonstrates good hip-knee-ankle alignment when cued. Modest increase in pain with WB on left with unilateral exercises but able to complete.      Patient will continue to benefit from skilled PT services to modify and progress therapeutic interventions, address functional mobility deficits, address ROM deficits, address strength deficits, analyze and address soft tissue restrictions, analyze and cue movement patterns and analyze and modify body mechanics/ergonomics to attain remaining goals. []  See Plan of Care  []  See progress note/recertification  []  See Discharge Summary         Progress towards goals / Updated goals:  Short Term Goals: To be accomplished in 1 weeks:              1. Pt will be 100% compliant with HEP to increase overall level of function. Long Term Goals: To be accomplished in 4 weeks:              1. Pt will increase FOTO score by 12 points to show increase in function and improved quality of life. 2. Pt will increase AROM knee flexion to 125* to match uninvolved side to improve pt ability to ambulate and participate in activities of choice. 3. Pt will increase Left SLS to >10 seconds to decrease falls risk   4. Pt will increase 30 sec STS test to >11 reps to show improved functional mobility. Frequency / Duration: Patient to be seen 2-3 times per week for 4 weeks.     PLAN  []  Upgrade activities as tolerated     [x]  Continue plan of care  []  Update interventions per flow sheet       []  Discharge due to:_  []  Other:_      Barrington Gutierrez, PT 2/17/2021  12:51 PM    Future Appointments   Date Time Provider Kyree Choudhury   2/24/2021 11:15 AM LEVI Duran [facility] BS AMB

## 2021-02-19 ENCOUNTER — HOSPITAL ENCOUNTER (OUTPATIENT)
Dept: PHYSICAL THERAPY | Age: 61
Discharge: HOME | End: 2021-02-19
Payer: MEDICAID

## 2021-02-19 PROCEDURE — 97110 THERAPEUTIC EXERCISES: CPT

## 2021-02-19 PROCEDURE — 97112 NEUROMUSCULAR REEDUCATION: CPT

## 2021-02-19 PROCEDURE — 97530 THERAPEUTIC ACTIVITIES: CPT

## 2021-02-19 NOTE — PROGRESS NOTES
PT DAILY TREATMENT NOTE     Patient Name: Joseph Smith  Date:2021  : 1960  [x]  Patient  Verified  Payor: [facility] MEDICAID / Plan: [facility] / Product Type: Managed Care Medicaid /    In time:215  Out time:308  Total Treatment Time (min): 48  Visit #: 3 of     Treatment Area: Pain in left knee [M25.562]  Unilateral primary osteoarthritis, left knee [M17.12]  Other tear of medial meniscus, current injury, left knee, subsequent encounter [P70.905G]    SUBJECTIVE  Pain Level (0-10 scale): 6/10  Any medication changes, allergies to medications, adverse drug reactions, diagnosis change, or new procedure performed?: [x] No    [] Yes (see summary sheet for update)  Subjective functional status/changes:   [] No changes reported  Pt stated that she is a little sore today.  Stated that the weather is playing a factor    OBJECTIVE    Modality rationale: decrease inflammation and decrease pain to improve the patients ability to increase ease with ADLs   Min Type Additional Details    [] Estim:  []Unatt       []IFC  []Premod                        []Other:  []w/ice   []w/heat  Position:  Location:    [] Estim: []Att    []TENS instruct  []NMES                    []Other:  []w/US   []w/ice   []w/heat  Position:  Location:    []  Traction: [] Cervical       []Lumbar                       [] Prone          []Supine                       []Intermittent   []Continuous Lbs:  [] before manual  [] after manual    []  Ultrasound: []Continuous   [] Pulsed                           []1MHz   []3MHz W/cm2:  Location:    []  Iontophoresis with dexamethasone         Location: [] Take home patch   [] In clinic   10 [x]  Ice     []  heat  []  Ice massage  []  Laser   []  Anodyne Position:supine  Location:left knee    []  Laser with stim  []  Other:  Position:  Location:    []  Vasopneumatic Device Pressure:       [] lo [] med [] hi   Temperature: [] lo [] med [] hi   [x] Skin assessment post-treatment: [x]intact []redness- no adverse reaction    []redness  adverse reaction:     23 min Therapeutic Exercise:  [x] See flow sheet :   Rationale: increase ROM and increase strength to improve the patients ability to increase ease with ADLs    10 min Therapeutic Activity:  [x]  See flow sheet :   Rationale: increase ROM and increase strength  to improve the patients ability to increase ease with functional tasks     10 min Neuromuscular Re-education:  [x]  See flow sheet :   Rationale: increase strength, improve coordination, improve balance and increase proprioception  to improve the patients ability to increase ease with functional gait and performing ADLs    With   [x] TE   [] TA   [] neuro   [] other: Patient Education: [x] Review HEP    [] Progressed/Changed HEP based on:   [] positioning   [] body mechanics   [] transfers   [] heat/ice application    [] other:      Other Objective/Functional Measures:   Had some difficulty with hip x 3 with TB  Side step ups were challenging  No LOB with Romberg on floor with EC, will add foam next visit    Pain Level (0-10 scale) post treatment: 7/10    ASSESSMENT/Changes in Function:   Pt is making slow progress toward goals. Pt cont with decreased strength and AROM in the left knee. Cont to have slight limp with ambulation. Has slight difficulty with sit to stands, but is improving. Pt reports increased ease with ADLs    Patient will continue to benefit from skilled PT services to modify and progress therapeutic interventions, address functional mobility deficits, address ROM deficits, address strength deficits, analyze and cue movement patterns, analyze and modify body mechanics/ergonomics, assess and modify postural abnormalities, address imbalance/dizziness and instruct in home and community integration to attain remaining goals.      [x]  See Plan of Care  []  See progress note/recertification  []  See Discharge Summary         Progress towards goals / Updated goals:  Short Term Goals: To be accomplished in 1 weeks:              1. Pt will be 100% compliant with HEP to increase overall level of function. Long Term Goals: To be accomplished in 4 weeks:              1. Pt will increase FOTO score by 12 points to show increase in function and improved quality of life. 2. Pt will increase AROM knee flexion to 125* to match uninvolved side to improve pt ability to ambulate and participate in activities of choice. 3. Pt will increase Left SLS to >10 seconds to decrease falls risk   4.  Pt will increase 30 sec STS test to >11 reps to show improved functional mobility.      PLAN  []  Upgrade activities as tolerated     [x]  Continue plan of care  []  Update interventions per flow sheet       []  Discharge due to:_  []  Other:_      Landy Florentino PTA 2/19/2021  6:53 AM    Future Appointments   Date Time Provider Kyree Choudhury   2/19/2021  2:15 PM Lyn Dinh PTA [facility]   2/22/2021  2:15 PM DAMIAN MaloneT [facility]   2/24/2021 11:15 AM LEVI Sue [facility] AMB   2/26/2021  2:15 PM Lyn Dinh, WIN [id number]   3/3/2021  1:30 PM Estephania Mireles, PT [facility]   3/5/2021  1:30 PM Estephania Mireles, PT [facility]   3/10/2021  1:30 PM Lyn Dinh, PTA [facility]   3/12/2021  2:15 PM Estephania Mireles, PT [facility]   3/17/2021  1:30 PM Lyn Dinh, PTA [facility]   3/19/2021  2:15 PM Estephania Mireles, PT [facility]   3/24/2021  2:15 PM Lyn Dinh, PTA [facility]   3/26/2021  1:30 PM Estephania Mireles, PT [facility]

## 2021-02-22 ENCOUNTER — HOSPITAL ENCOUNTER (OUTPATIENT)
Dept: PHYSICAL THERAPY | Age: 61
Discharge: HOME | End: 2021-02-22
Payer: MEDICAID

## 2021-02-22 PROCEDURE — 97530 THERAPEUTIC ACTIVITIES: CPT

## 2021-02-22 PROCEDURE — 97110 THERAPEUTIC EXERCISES: CPT

## 2021-02-22 PROCEDURE — 97112 NEUROMUSCULAR REEDUCATION: CPT

## 2021-02-22 NOTE — PROGRESS NOTES
PT DAILY TREATMENT NOTE     Patient Name: Joe Kulkarni  Date:2021  : 1960  [x]  Patient  Verified  Payor: [facility] MEDICAID / Plan: [facility] / Product Type: Managed Care Medicaid /    In time:2:15P  Out time: 2:56P  Total Treatment Time (min): 41  Visit #: 4 of     Treatment Area: Pain in left knee [M25.562]  Unilateral primary osteoarthritis, left knee [M17.12]  Other tear of medial meniscus, current injury, left knee, subsequent encounter [C97.175E]    SUBJECTIVE  Pain Level (0-10 scale): 0/10  Any medication changes, allergies to medications, adverse drug reactions, diagnosis change, or new procedure performed?: [x] No    [] Yes (see summary sheet for update)  Subjective functional status/changes:   [] No changes reported  Pt reports no pain upon arriving to today's session stating \"I walked some before coming here\". Further states \"I continue to have my good days and my bad days\". Reports HEP is going well, with performing every other day at this time.      OBJECTIVE    21 min Therapeutic Exercise:  [x] See flow sheet :   Rationale: increase ROM and increase strength to improve the patients ability to increase ease with ADLs    10 min Therapeutic Activity:  [x]  See flow sheet :   Rationale: increase ROM and increase strength  to improve the patients ability to increase ease with functional tasks     10 min Neuromuscular Re-education:  [x]  See flow sheet :   Rationale: increase strength, improve coordination, improve balance and increase proprioception  to improve the patients ability to increase ease with functional gait and performing ADLs    With   [x] TE   [x] TA   [x] neuro   [] other: Patient Education: [x] Review HEP    [] Progressed/Changed HEP based on:   [] positioning   [] body mechanics   [] transfers   [] heat/ice application    [x] other: Pt ed on importance of completing HEP daily in order to expedite full return to PLOF - pt verbally demonstrates knowledge and understanding of this. Pain Level (0-10 scale) post treatment: 5/10    ASSESSMENT/Changes in Function:     Progressed POC through increased repititions and resistance of activities performed over previous sessions, in addition to added flexibility activities and increased step height with step up exercises. Pt confirms no increase in symptoms while performing all activities throughout session, however reports increased pain levels upon conclusion of session. Asked pt if a particular exercise during the session increased pain levels and she confirmed performing the step ups caused irritation to lateral aspect of left knee. Ed pt on importance of verbalizing symptom increase during the session in order to best tailor session, modify activities and progress POC without irritation - pt verbalized knowledge and understanding of this. Denies modality application to end session. Patient will continue to benefit from skilled PT services to modify and progress therapeutic interventions, address functional mobility deficits, address ROM deficits, address strength deficits, analyze and cue movement patterns, analyze and modify body mechanics/ergonomics, assess and modify postural abnormalities, address imbalance/dizziness and instruct in home and community integration to attain remaining goals. [x]  See Plan of Care  []  See progress note/recertification  []  See Discharge Summary         Progress towards goals / Updated goals:  Short Term Goals: To be accomplished in 1 weeks:              1. Pt will be 100% compliant with HEP to increase overall level of function. Progressing: reports completing HEP every other day at this time, 02/22/21  Long Term Goals: To be accomplished in 4 weeks:              1. Pt will increase FOTO score by 12 points to show increase in function and improved quality of life.    2. Pt will increase AROM knee flexion to 125* to match uninvolved side to improve pt ability to ambulate and participate in activities of choice. 3. Pt will increase Left SLS to >10 seconds to decrease falls risk   4.  Pt will increase 30 sec STS test to >11 reps to show improved functional mobility.      PLAN  [x]  Upgrade activities as tolerated     [x]  Continue plan of care  []  Update interventions per flow sheet       []  Discharge due to:_  []  Other:_      Ofelia BAILEE Munoz 2/22/2021  6:53 AM    Future Appointments   Date Time Provider Kyree Choudhury   2/24/2021 11:15 AM Zelpha Habermann, PA [facility] BS AMB   2/26/2021  2:15 PM Austin Ruffing, PT [id number] [facility]   3/3/2021  1:30 PM Austin Ruffing, PT [facility]   3/5/2021  1:30 PM Austin Ruffing, PT [facility]   3/10/2021  1:30 PM Ilean Hedges, PTA [facility]   3/12/2021  2:15 PM Austin Ruffing, PT [facility]   3/17/2021  1:30 PM Ilean Hedges, PTA [facility]   3/19/2021  2:15 PM Austin Ruffing, PT [facility]   3/24/2021  2:15 PM Ilean Hedges, PTA [facility]   3/26/2021  1:30 PM Austin Ruffing, PT [facility]

## 2021-02-24 ENCOUNTER — OFFICE VISIT (OUTPATIENT)
Dept: ORTHOPEDIC SURGERY | Age: 61
End: 2021-02-24
Payer: MEDICAID

## 2021-02-24 VITALS
OXYGEN SATURATION: 97 % | BODY MASS INDEX: 30.22 KG/M2 | DIASTOLIC BLOOD PRESSURE: 72 MMHG | HEART RATE: 91 BPM | WEIGHT: 177 LBS | TEMPERATURE: 97.1 F | SYSTOLIC BLOOD PRESSURE: 128 MMHG | HEIGHT: 64 IN | RESPIRATION RATE: 16 BRPM

## 2021-02-24 DIAGNOSIS — S83.242D TEAR OF MEDIAL MENISCUS OF LEFT KNEE, CURRENT, UNSPECIFIED TEAR TYPE, SUBSEQUENT ENCOUNTER: Primary | ICD-10-CM

## 2021-02-24 DIAGNOSIS — M17.12 PRIMARY OSTEOARTHRITIS OF LEFT KNEE: ICD-10-CM

## 2021-02-24 PROCEDURE — 99024 POSTOP FOLLOW-UP VISIT: CPT | Performed by: PHYSICIAN ASSISTANT

## 2021-02-24 NOTE — PROGRESS NOTES
Patient: Karli Rowe  YOB: 1960       HISTORY:  The patient presents for reevaluation of her left knee status post arthroscopic partial medial meniscectomy with grade IV condromalacia on 1/27/2021. Patient states pain is a 4 out of 10.  she has gone to physical therapy. Pt describes as occasional sharp, \"lingering\" pain today. She states that overall she is improved. Patient denies any fever, chills, chest pain, shortness of breath or calf pain. There are no new illness or injuries to report since last seen in the office. Pain Assessment  2/3/2021   Location of Pain Knee   Location Modifiers Left   Severity of Pain 5   Quality of Pain Aching; Sharp   Duration of Pain Persistent   Frequency of Pain Constant   Date Pain First Started -   Aggravating Factors Standing   Aggravating Factors Comment -   Limiting Behavior Some   Relieving Factors Rest;NSAID;Other (Comment)   Relieving Factors Comment tylenol   Result of Injury Yes   Work-Related Injury No   Type of Injury Other (Comment)   Type of Injury Comment twist knee       PHYSICAL EXAMINATION:    There were no vitals taken for this visit. The patient is a well-developed, well-nourished female in no acute distress. The patient is alert and oriented times three. The patient appears to be well groomed. Mood and affect are normal.   ORTHOPEDIC EXAM of Left knee: Inspection: Effusion not present,  incisions well healed  TTP: medial joint  Range of motion: 0-120 flexion  Stability: Stable  Strength: 5/5  2+ distal pulses      IMPRESSION:  Status post Left knee arthroscopic partial medial meniscectomy with degenerative arthritis   Encounter Diagnoses   Name Primary?  Tear of medial meniscus of left knee, current, unspecified tear type, subsequent encounter Yes    Primary osteoarthritis of left knee            PLAN: Pt presents  status post left knee arthroscopic partial medial meniscectomy with degenerative arthritis on 1/27/2021.   1. Patient improving post operatively  2. Will cont to gradually increase activities being mindful of pain and swelling  3.  Will auth euflexxa given persistent pain and degen arthritis with joint space narrowing    RTC 3-4 weeks    KENNETH Davila [facility]

## 2021-02-26 ENCOUNTER — HOSPITAL ENCOUNTER (OUTPATIENT)
Dept: PHYSICAL THERAPY | Age: 61
Discharge: HOME | End: 2021-02-26
Payer: MEDICAID

## 2021-02-26 PROCEDURE — 97112 NEUROMUSCULAR REEDUCATION: CPT

## 2021-02-26 PROCEDURE — 97530 THERAPEUTIC ACTIVITIES: CPT

## 2021-02-26 PROCEDURE — 97110 THERAPEUTIC EXERCISES: CPT

## 2021-02-26 NOTE — PROGRESS NOTES
PT DAILY TREATMENT NOTE     Patient Name: Christos Castillo  Date:2021  : 1960  [x]  Patient  Verified  Payor: [facility] MEDICAID / Plan: JO[facility]ERSON [facility] / Product Type: Managed Care Medicaid /    In time:2:15  Out time:2:55  Total Treatment Time (min): 40  Visit #: 5 of     Treatment Area: Pain in left knee [M25.562]  Unilateral primary osteoarthritis, left knee [M17.12]  Other tear of medial meniscus, current injury, left knee, subsequent encounter [U07.470B]    SUBJECTIVE  Pain Level (0-10 scale): 0/10  Any medication changes, allergies to medications, adverse drug reactions, diagnosis change, or new procedure performed?: [x] No    [] Yes (see summary sheet for update)  Subjective functional status/changes:   [] No changes reported  Pt reports she usually has pain around the back of the knee and wraps around the front. OBJECTIVE     17 min Therapeutic Exercise:  [x]? See flow sheet :   Rationale: increase ROM and increase strength to improve the patients ability to increase ease with ADLs     15 min Therapeutic Activity:  [x]? See flow sheet :   Rationale: increase ROM and increase strength  to improve the patients ability to increase ease with functional tasks     8 min Neuromuscular Re-education:  [x]? See flow sheet :   Rationale: increase strength, improve coordination, improve balance and increase proprioception  to improve the patients ability to increase ease with functional gait and performing ADLs       With   [x] TE   [] TA   [] neuro   [] other: Patient Education: [x] Review HEP    [x] Progressed/Changed HEP based on:   [] positioning   [] body mechanics   [] transfers   [] heat/ice application    [] other:      Other Objective/Functional Measures:   112* AROM knee flexion   Pt did not handle SLS on foam pad well d/t ankle pain but was much better on flat ground.      Pain Level (0-10 scale) post treatment: 0/10    ASSESSMENT/Changes in Function:   Pt reported she has been walking with her  at cardiac rehab and she feel sit has been helping. Pt continues to progress toward therapy goals showing increased strength and ROM since evaluation. Pt will benefit from continued increase in intensity on each visit to progress leg and hip stabilizer strength to allow pt to increase in function. Patient will continue to benefit from skilled PT services to modify and progress therapeutic interventions, address functional mobility deficits, address ROM deficits and address strength deficits to attain remaining goals. Progress towards goals / Updated goals:  1. Pt will increase FOTO score by 12 points to show increase in function and improved quality of life. 2. Pt will increase AROM knee flexion to 125* to match uninvolved side to improve pt ability to ambulate and participate in activities of choice. Met: 112* AROM (21)  3. Pt will increase Left SLS to >10 seconds to decrease falls risk   Met: 13 sec (21)  4.  Pt will increase 30 sec STS test to >11 reps to show improved functional mobility.   Progressin reps (21)        PLAN  []  Upgrade activities as tolerated     [x]  Continue plan of care  []  Update interventions per flow sheet       []  Discharge due to:_  []  Other:_      Wojciech Ocampo, SPT 2021  1:54 PM    Future Appointments   Date Time Provider Kyree Choudhury   2021  2:15 PM Mago Valero, PT [facility]   3/3/2021  1:30 PM Mago Valero, PT [facility]   3/5/2021  1:30 PM Mago Valero, PT [facility]   3/10/2021  1:30 PM Geena Valladares, PTA [facility]   3/12/2021  2:15 PM Mago Valero, PT [facility]   3/16/2021 10:45 AM LEVI Melvin [facility]   3/17/2021  1:30 PM Geena Valladares, PTA [facility]   3/19/2021  2:15 PM Radha COLIN [facility]   3/24/2021  2:15 PM Geena Valladares PTA [facility]   3/26/2021  1:30 PM Mago Valero PT [facility]

## 2021-03-05 ENCOUNTER — HOSPITAL ENCOUNTER (OUTPATIENT)
Dept: PHYSICAL THERAPY | Age: 61
Discharge: HOME | End: 2021-03-05
Payer: MEDICAID

## 2021-03-05 PROCEDURE — 97112 NEUROMUSCULAR REEDUCATION: CPT

## 2021-03-05 PROCEDURE — 97530 THERAPEUTIC ACTIVITIES: CPT

## 2021-03-05 PROCEDURE — 97110 THERAPEUTIC EXERCISES: CPT

## 2021-03-05 NOTE — PROGRESS NOTES
PT DAILY TREATMENT NOTE     Patient Name: Julia Ro  Date:3/5/2021  : 1960  [x]  Patient  Verified  Payor: [facility] MEDICAID / [facility] / Product Type: Managed Care Medicaid /    In time:1:30  Out time:2:18  Total Treatment Time (min): 48  Visit #: 6 of     Treatment Area: Pain in left knee [M25.562]  Unilateral primary osteoarthritis, left knee [M17.12]  Other tear of medial meniscus, current injury, left knee, subsequent encounter [H35.809W]    SUBJECTIVE  Pain Level (0-10 scale): 7/10  Any medication changes, allergies to medications, adverse drug reactions, diagnosis change, or new procedure performed?: [x] No    [] Yes (see summary sheet for update)  Subjective functional status/changes:   [] No changes reported  Pt reports hearing a pop in the knee last night after stretching out in bed, has had increased pain since then.      OBJECTIVE    Modality rationale: decrease edema and decrease pain to improve the patients ability to carry out ADL's   Min Type Additional Details    [] Estim:  []Unatt       []IFC  []Premod                        []Other:  []w/ice   []w/heat  Position:  Location:    [] Estim: []Att    []TENS instruct  []NMES                    []Other:  []w/US   []w/ice   []w/heat  Position:  Location:    []  Traction: [] Cervical       []Lumbar                       [] Prone          []Supine                       []Intermittent   []Continuous Lbs:  [] before manual  [] after manual    []  Ultrasound: []Continuous   [] Pulsed                           []1MHz   []3MHz W/cm2:  Location:    []  Iontophoresis with dexamethasone         Location: [] Take home patch   [] In clinic   10 [x]  Ice     []  heat  []  Ice massage  []  Laser   []  Anodyne Position: seated   Location: R knee, anterior and posterior.     []  Laser with stim  []  Other:  Position:  Location:    []  Vasopneumatic Device Pressure:       [] lo [] med [] hi   Temperature: [] lo [] med [] hi   [] Skin assessment post-treatment:  []intact []redness- no adverse reaction    []redness  adverse reaction:      10 min Therapeutic Exercise:  [x]? ? See flow sheet : leg press,    Rationale: increase ROM and increase strength to improve the patients ability to increase ease with ADLs     10 min Therapeutic Activity:  [x]? ?  See flow sheet : heel/toe walks, banded shuffle. Rationale: increase ROM and increase strength  to improve the patients ability to increase ease with functional tasks     18 min Neuromuscular Re-education:  [x]? ?  See flow sheet : box step ups, glute wall bridge. Rationale: increase strength, improve coordination, improve balance and increase proprioception  to improve the patients ability to increase ease with functional gait and performing ADLs            With   [] TE   [] TA   [x] neuro   [] other: Patient Education: [x] Review HEP    [] Progressed/Changed HEP based on:   [] positioning   [] body mechanics   [] transfers   [] heat/ice application    [] other:      Other Objective/Functional Measures:   112* knee flexion on this date. Right knee extension strength 4-/5 2nd to pain. Only did exercise with Right LE leading on this date. Pain Level (0-10 scale) post treatment: 3/10    ASSESSMENT/Changes in Function:   Pt reported knee pain that stated late last night. Upon evaluation, pt had postive McMurreys, TTP infra and suprapatellar region. Pt felt the pain after stretching in bed when waking up. On this date decreased activity and exercise to limit knee aggravation. Will assess patient status on next visit. Patient will continue to benefit from skilled PT services to modify and progress therapeutic interventions, address functional mobility deficits, address ROM deficits and address strength deficits to attain remaining goals. Progress towards goals / Updated goals:  1.  Pt will increase FOTO score by 12 points to show increase in function and improved quality of life.   2. Pt will increase AROM knee flexion to 125* to match uninvolved side to improve pt ability to ambulate and participate in activities of choice. Not Met: 112* AROM (21)  3. Pt will increase Left SLS to >10 seconds to decrease falls risk   Met: 13 sec (21)  4.  Pt will increase 30 sec STS test to >11 reps to show improved functional mobility.   Progressin reps (21)     PLAN  []  Upgrade activities as tolerated     [x]  Continue plan of care  []  Update interventions per flow sheet       []  Discharge due to:_  []  Other:_      Jazzyrenvince Vu, SPT 3/5/2021  1:33 PM    Future Appointments   Date Time Provider Kyree Choudhury   3/10/2021  1:30 PM Faviola Moulton [facility]   3/12/2021  2:15 PM Estephania Mireles, PT [facility]   3/16/2021 10:45 AM LEVI Sue [facility]   3/17/2021  1:30 PM Lyn Dinh, PTA [facility]   3/19/2021  2:15 PM Jenna [facility]   3/24/2021  2:15 PM Lyn Dinh, PTA [facility]   3/26/2021  1:30 PM Estephania Mireles, PT [facility]

## 2021-03-10 ENCOUNTER — HOSPITAL ENCOUNTER (OUTPATIENT)
Dept: PHYSICAL THERAPY | Age: 61
Discharge: HOME | End: 2021-03-10
Payer: MEDICAID

## 2021-03-10 PROCEDURE — 97110 THERAPEUTIC EXERCISES: CPT

## 2021-03-10 PROCEDURE — 97530 THERAPEUTIC ACTIVITIES: CPT

## 2021-03-10 PROCEDURE — 97112 NEUROMUSCULAR REEDUCATION: CPT

## 2021-03-10 NOTE — PROGRESS NOTES
[facility] [address]  [phone] fax    Physical Therapy Progress Note  Patient name: Christos Castillo Start of Care: 2021   Referral source: Jemima Brenner,* : 1960               Medical Diagnosis: Pain in left knee [M25.562]  Unilateral primary osteoarthritis, left knee [M17.12]  Other tear of medial meniscus, current injury, left knee, subsequent encounter [P98.115F]  Payor: [facility] MEDICAID / [facility] / Product Type: Managed Care Medicaid /  Onset Date:21 menisectomy               Treatment Diagnosis: Left knee pain    Prior Hospitalization: see medical history Provider#: [id number]   Medications: Verified on Patient summary List    Comorbidities: Epilepsy, diabetes, arthritis, thyroid problems. 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 Visits from Start of Care: 7    Missed Visits: 2    Established Goals:         Excellent           Good         Limited           None  [] Increased ROM   [x]  []  []  []  [] Increased Strength  []  []  [x]  []  [] Increased Mobility  []  [x]  []  []   [] Decreased Pain   []  []  [x]  []  [] Decreased Swelling  []  []  []  []    Key Functional Changes:   Patient is progressing well in therapy. AROM in the left knee has increased to 125*. Patient continues with decreased strength for flexion and extension. Patient continues to report difficulty with steps outside her home. Patient continues to report mild to moderate pain in the left knee. Static balance is improving and patient is able to maintain balance in left SLS for 13 seconds. Patient was able to perform 12 sit to stands with 30 second STS. Patient reported being partially compliant with HEP.  FOTO score increased 26 points which indicates significant improvement in functional ability    Updated Goals: to be achieved in 4 weeks:  1. Pt will be 100% compliant with HEP to increase overall level of function. 2. Pt will increase AROM knee flexion to 130* to match uninvolved side to improve pt ability to ambulate and participate in activities of choice. 3. Pt will increase Left SLS to >30 seconds to decrease falls risk   4. Pt will increase 30 sec STS test to >15 reps to show improved functional mobility.   5. Pt will increased strength for flex and ext in left knee to 4/5 for improved functional ambulation tolerance and stair negotiation     ASSESSMENT/RECOMMENDATIONS:  Patient will continue to benefit from skilled PT services to modify and progress therapeutic interventions, address functional mobility deficits, address ROM deficits, address strength deficits, analyze and cue movement patterns, analyze and modify body mechanics/ergonomics, assess and modify postural abnormalities, address imbalance/dizziness and instruct in home and community integration to attain remaining goals. [x]Continue therapy per initial plan/protocol at a frequency of  2-3 x per week for 4 weeks  []Continue therapy with the following recommended changes:_____________________      _____________________________________________________________________  []Discontinue therapy progressing towards or have reached established goals  []Discontinue therapy due to lack of appreciable progress towards goals  []Discontinue therapy due to lack of attendance or compliance  []Await Physician's recommendations/decisions regarding therapy  []Other:________________________________________________________________    Thank you for this referral.   Norman Lyn, PTA 3/10/2021 6:59 AM    NOTE TO PHYSICIAN:  [address] [facility] ([phone]  If you are unable to process this request in 24 hours please contact our office: [phone]  I have read the above report and request that my patient continue as recommended. ? I have read the above report and request that my patient continue therapy with the following changes/special instructions:____________________________________  ? I have read the above report and request that my patient be discharged from therapy.     Physicians signature: ______________________________Date: ______Time:______     Julissa Steinberg,*

## 2021-03-10 NOTE — PROGRESS NOTES
PT DAILY TREATMENT NOTE     Patient Name: Alo Weiss  Date:3/10/2021  : 1960  [x]  Patient  Verified  Payor: [facility] MEDICAID / Plan: [facility] / Product Type: Managed Care Medicaid /    In time:130  Out time:220  Total Treatment Time (min): 50  Visit #: 7 of     Treatment Area: Pain in left knee [M25.562]  Unilateral primary osteoarthritis, left knee [M17.12]  Other tear of medial meniscus, current injury, left knee, subsequent encounter [O06.466N]    SUBJECTIVE  Pain Level (0-10 scale): 3/10  Any medication changes, allergies to medications, adverse drug reactions, diagnosis change, or new procedure performed?: [x] No    [] Yes (see summary sheet for update)  Subjective functional status/changes:   [] No changes reported  Pt stated that her knee is a little more sore today    OBJECTIVE    Modality rationale: decrease inflammation and decrease pain to improve the patients ability to increase ease with ADLs   Min Type Additional Details    [] Estim:  []Unatt       []IFC  []Premod                        []Other:  []w/ice   []w/heat  Position:  Location:    [] Estim: []Att    []TENS instruct  []NMES                    []Other:  []w/US   []w/ice   []w/heat  Position:  Location:    []  Traction: [] Cervical       []Lumbar                       [] Prone          []Supine                       []Intermittent   []Continuous Lbs:  [] before manual  [] after manual    []  Ultrasound: []Continuous   [] Pulsed                           []1MHz   []3MHz W/cm2:  Location:    []  Iontophoresis with dexamethasone         Location: [] Take home patch   [] In clinic   10 [x]  Ice     []  heat  []  Ice massage  []  Laser   []  Anodyne Position:supine  Location:left knee    []  Laser with stim  []  Other:  Position:  Location:    []  Vasopneumatic Device Pressure:       [] lo [] med [] hi   Temperature: [] lo [] med [] hi   [x] Skin assessment post-treatment:  [x]intact []redness- no adverse reaction    []redness  adverse reaction:     10 min Therapeutic Exercise:  [x]? ?? See flow sheet :    Rationale: increase ROM and increase strength to improve the patients ability to increase ease with ADLs     12 min Therapeutic Activity:  [x]? ??  See flow sheet :FOTO and reassess goals   Rationale: increase ROM and increase strength  to improve the patients ability to increase ease with functional tasks     18 min Neuromuscular Re-education:  [x]? ??  See flow sheet : box step ups    Rationale: increase strength, improve coordination, improve balance and increase proprioception  to improve the patients ability to increase ease with functional gait and performing ADLs    With   [x] TE   [] TA   [] neuro   [] other: Patient Education: [x] Review HEP    [] Progressed/Changed HEP based on:   [] positioning   [] body mechanics   [] transfers   [] heat/ice application    [] other:      Other Objective/Functional Measures:   FOTO 57     Pain Level (0-10 scale) post treatment: 1/10    ASSESSMENT/Changes in Function:   See progress note    Patient will continue to benefit from skilled PT services to modify and progress therapeutic interventions, address functional mobility deficits, address ROM deficits, address strength deficits, analyze and cue movement patterns, analyze and modify body mechanics/ergonomics, assess and modify postural abnormalities, address imbalance/dizziness and instruct in home and community integration to attain remaining goals. []  See Plan of Care  [x]  See progress note/recertification  []  See Discharge Summary         Progress towards goals / Updated goals:  Short Term Goals: To be accomplished in 1 weeks:              1. Pt will be 100% compliant with HEP to increase overall level of function. Progressing: pt reports partial complaince    1. Pt will increase FOTO score by 12 points to show increase in function and improved quality of life. Goal met. Increased from 31 to 57  2.  Pt will increase AROM knee flexion to 125* to match uninvolved side to improve pt ability to ambulate and participate in activities of choice. Goal met. AROM knee flex is 125*  3. Pt will increase Left SLS to >10 seconds to decrease falls risk   Met: 13 sec (2/26/21)  4. Pt will increase 30 sec STS test to >11 reps to show improved functional mobility.   Goal met.  Pt was able to perform 12 sit to stands in 30\"    PLAN  []  Upgrade activities as tolerated     [x]  Continue plan of care  []  Update interventions per flow sheet       []  Discharge due to:_  []  Other:_      Leila Maynard PTA 3/10/2021  6:43 AM    Future Appointments   Date Time Provider Kyree Choudhury   3/10/2021  1:30 PM Leslie Castillo [facility]   3/12/2021  2:15 PM George Robbins, PT [facility]   3/16/2021 10:45 AM LEVI Moreno [facility]   3/17/2021  1:30 PM Meenu Kinney PTA [facility]   3/19/2021  2:15 PM [id number]   3/24/2021  2:15 PM Meenu Kinney PTA [facility]   3/26/2021  1:30 PM George Robbins PT [facility]

## 2021-03-11 ENCOUNTER — DOCUMENTATION ONLY (OUTPATIENT)
Dept: ORTHOPEDIC SURGERY | Age: 61
End: 2021-03-11

## 2021-03-12 ENCOUNTER — HOSPITAL ENCOUNTER (OUTPATIENT)
Dept: PHYSICAL THERAPY | Age: 61
Discharge: HOME | End: 2021-03-12
Payer: MEDICAID

## 2021-03-12 PROCEDURE — 97110 THERAPEUTIC EXERCISES: CPT

## 2021-03-12 NOTE — PROGRESS NOTES
PT DAILY TREATMENT NOTE     Patient Name: Elvia Sports  Date:3/12/2021  : 1960  [x]  Patient  Verified  Payor: [facility] MEDICAID / Plan: [facility] / Product Type: Managed Care Medicaid /    In time:2:13  Out time:3:18  Total Treatment Time (min): 72  Visit #: 1 of     Medicare/BCBS Only   Total Timed Codes (min):  50 1:1 Treatment Time:  50       Treatment Area: Pain in left knee [M25.562]  Unilateral primary osteoarthritis, left knee [M17.12]  Other tear of medial meniscus, current injury, left knee, subsequent encounter [N01.845Z]    SUBJECTIVE  Pain Level (0-10 scale): 2/10  Any medication changes, allergies to medications, adverse drug reactions, diagnosis change, or new procedure performed?: [x] No    [] Yes (see summary sheet for update)  Subjective functional status/changes:   [] No changes reported  Patient states she has been having difficulty sleeping, bending down into a squat, going up and down stairs, and walking for a long period of time. Patient states she goes down the stairs backwards still. She states her pain is in the whole knee, but mostly along the top. Patient reports she is going to get an injection next week.     OBJECTIVE    Modality rationale: decrease inflammation and decrease pain to improve the patients ability to perform functional limitation   Min Type Additional Details    [] Estim:  []Unatt       []IFC  []Premod                        []Other:  []w/ice   []w/heat  Position:  Location:    [] Estim: []Att    []TENS instruct  []NMES                    []Other:  []w/US   []w/ice   []w/heat  Position:  Location:    []  Traction: [] Cervical       []Lumbar                       [] Prone          []Supine                       []Intermittent   []Continuous Lbs:  [] before manual  [] after manual    []  Ultrasound: []Continuous   [] Pulsed                           []1MHz   []3MHz W/cm2:  Location:    []  Iontophoresis with dexamethasone         Location: [] Take home patch   [] In clinic   15 [x]  Ice with game ready and no compression      []  heat  []  Ice massage  []  Laser   []  Anodyne Position: seated  Location: left knee    []  Laser with stim  []  Other:  Position:  Location:    []  Vasopneumatic Device  Gameready Pressure:       [] lo [] med [] hi   Temperature: [] lo [] med [] hi   [x] Skin assessment post-treatment:  [x]intact []redness- no adverse reaction    []redness  adverse reaction:     50 min Therapeutic Exercise:  [x] See flow sheet :   Rationale: increase ROM, increase strength and improve balance to improve the patients ability to carry out ADLs    With   [] TE   [] TA   [] neuro   [] other: Patient Education: [x] Review HEP    [] Progressed/Changed HEP based on:   [] positioning   [] body mechanics   [] transfers   [] heat/ice application    [] other:      Other Objective/Functional Measures:   Minor instability and LOB during SLS balance on foam with 1 UE support as needed  Patient needed verbal cuing during side shuffle and lunges for form correction   Added 4\" box step downs and lunges to assist patient with coming down the stairs  Added [employer] [address] machine for strength    Pain Level (0-10 scale) post treatment: 0/10    ASSESSMENT/Changes in Function:   Patient is still limited by pain with knee extension during exercises due to significant weakness in the quads. Patient continues to demonstrate instability in the knee noted by imbalance during balance activities. She has difficulty descending stairs, squatting, and prolonged sitting. Patient still has increased pain levels throughout the day along anterior patella due to lack of muscle endurance and weakness. She is less stiff and has improving ROM. Patient continues to initiate HEP and progress towards therapy goals.     Patient will continue to benefit from skilled PT services to modify and progress therapeutic interventions, address functional mobility deficits, address ROM deficits, address strength deficits, analyze and address soft tissue restrictions, analyze and cue movement patterns and address imbalance/dizziness to attain remaining goals. Progress towards goals / Updated goals:  1. Pt will be 100% compliant with HEP to increase overall level of function. 2. Pt will increase AROM knee flexion to 130* to match uninvolved side to improve pt ability to ambulate and participate in activities of choice. 3. Pt will increase Left SLS to >30 seconds to decrease falls risk   4.  Pt will increase 30 sec STS test to >15 reps to show improved functional mobility.   5. Pt will increased strength for flex and ext in left knee to 4/5 for improved functional ambulation tolerance and stair negotiation     PLAN  []  Upgrade activities as tolerated     [x]  Continue plan of care  []  Update interventions per flow sheet       []  Discharge due to:_  []  Other:_      Greta Torre 3/12/2021  2:01 PM    Future Appointments   Date Time Provider Kyree Choudhury   3/12/2021  2:15 PM Severo Jay, PT [facility]   3/16/2021 10:45 AM Mauri Magallon PA [facility]   3/17/2021  1:30 PM Slim Hawthorne PTA [facility]   3/19/2021  2:15 PM Adelina Navas [facility]   3/23/2021 11:00 AM LEVI Gordon [facility]   3/24/2021  2:15 PM Slim Hawthorne PTA [facility]   3/26/2021  1:30 PM Severo Jay, PT [facility]   3/30/2021 11:00 AM Mauri Cobian PA [facility]

## 2021-03-16 ENCOUNTER — OFFICE VISIT (OUTPATIENT)
Dept: ORTHOPEDIC SURGERY | Age: 61
End: 2021-03-16
Payer: MEDICAID

## 2021-03-16 VITALS
HEART RATE: 98 BPM | WEIGHT: 175 LBS | BODY MASS INDEX: 29.88 KG/M2 | DIASTOLIC BLOOD PRESSURE: 73 MMHG | SYSTOLIC BLOOD PRESSURE: 125 MMHG | HEIGHT: 64 IN | RESPIRATION RATE: 20 BRPM | TEMPERATURE: 95.7 F

## 2021-03-16 DIAGNOSIS — M17.12 PRIMARY OSTEOARTHRITIS OF LEFT KNEE: Primary | ICD-10-CM

## 2021-03-16 PROCEDURE — 20611 DRAIN/INJ JOINT/BURSA W/US: CPT | Performed by: PHYSICIAN ASSISTANT

## 2021-03-16 PROCEDURE — 99024 POSTOP FOLLOW-UP VISIT: CPT | Performed by: PHYSICIAN ASSISTANT

## 2021-03-16 NOTE — PROGRESS NOTES
Patient: Mason Calixto  YOB: 1960       HISTORY:  The patient presents for reevaluation of her left knee status post arthroscopic partial medial meniscectomy with grade IV condromalacia on 1/27/2021. Patient states pain is a 4 out of 10.  she has gone to physical therapy but feels like she is no longer improving. Pt describes as occasional sharp, \"lingering\" pain today. She states that overall she is improved. Patient denies any fever, chills, chest pain, shortness of breath or calf pain. There are no new illness or injuries to report since last seen in the office. Pain Assessment  3/16/2021   Location of Pain Knee   Location Modifiers Left   Severity of Pain 4   Quality of Pain Sharp; Throbbing   Quality of Pain Comment -   Duration of Pain Persistent   Frequency of Pain Constant   Date Pain First Started -   Aggravating Factors Other (Comment); Standing;Exercise   Aggravating Factors Comment weather   Limiting Behavior Yes   Relieving Factors Ice;NSAID   Relieving Factors Comment -   Result of Injury No   Work-Related Injury -   Type of Injury -   Type of Injury Comment -       PHYSICAL EXAMINATION:    Visit Vitals  /73 (BP 1 Location: Right upper arm, BP Patient Position: Sitting, BP Cuff Size: Large adult)   Pulse 98   Temp (!) 95.7 °F (35.4 °C) (Temporal)   Resp 20   Ht 5' 4\" (1.626 m)   Wt 175 lb (79.4 kg)   BMI 30.04 kg/m²     The patient is a well-developed, well-nourished female in no acute distress. The patient is alert and oriented times three. The patient appears to be well groomed. Mood and affect are normal.   ORTHOPEDIC EXAM of Left knee: Inspection: Effusion not present,  incisions well healed  TTP: medial joint  Range of motion: 0-120 flexion  Stability: Stable  Strength: 5/5  2+ distal pulses    PROCEDURE: Left knee Injection with Ultrasound Guidance    Indication:Left Knee pain/swelling    After sterile prep, 2 cc of Euflexxa were injected into the left Knee. Intra-articular Ultrasound images captured using [address] [place] [address] Ultrasound machine using a frequency of 10 MHz with a linear transducer and scanned into patient's chart. [facility]  OFFICE PROCEDURE PROGRESS NOTE        Chart reviewed for the following:  Marcial BAKER PA, have reviewed the History, Physical and updated the Allergic reactions for [address] performed immediately prior to start of procedure:  Marcial BAKER PA-C, have performed the following reviews on Alo Weiss prior to the start of the procedure:            * Patient was identified by name and date of birth   * Agreement on procedure being performed was verified  * Risks and Benefits explained to the patient  * Procedure site verified and marked as necessary  * Patient was positioned for comfort  * Consent was signed and verified     Time: 11:15 AM    Date of procedure: 3/16/2021    Procedure performed by:  LEVI Galloway    Provider assisted by: (see medication administration)    How tolerated by patient: tolerated the procedure well with no complications    Comments: none    IMPRESSION:  Status post Left knee arthroscopic partial medial meniscectomy with degenerative arthritis   Encounter Diagnosis   Name Primary?  Primary osteoarthritis of left knee Yes           PLAN: Pt presents  status post left knee arthroscopic partial medial meniscectomy with degenerative arthritis on 1/27/2021.  1. Patient improving post operatively  2.  Will start euflexxa series today given persistent pain      RTC 1 week for second euflexxa shot    Ranjeet Scott PA-C  [facility]ist

## 2021-03-17 ENCOUNTER — APPOINTMENT (OUTPATIENT)
Dept: PHYSICAL THERAPY | Age: 61
End: 2021-03-17
Payer: MEDICAID

## 2021-03-18 NOTE — PROGRESS NOTES
[facility]  [address] [facility]  [phone] fax    Physical Therapy Discharge Summary    Patient name: Tootie Talbot Start of Care: 2021   Referral source: Rupali Fortune Jessa,* : 1960               Medical Diagnosis: Pain in left knee [M25.562]  Unilateral primary osteoarthritis, left knee [M17.12]  Other tear of medial meniscus, current injury, left knee, subsequent encounter [E15.059B]  Payor[facility] MEDICAID / [facility] / Product Type: Managed Care Medicaid /  Onset Date:21 menisectomy               Treatment Diagnosis: Left knee pain    Prior Hospitalization: see medical history Provider#: [id number]   Medications: Verified on Patient summary List    Comorbidities: Epilepsy, diabetes, arthritis, thyroid problems.   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 Visits from Start of Care: 8    Missed Visits: 1    Reporting Period :  3/10/21 to 3/12/21    Summary of Care:  Goal: Pt will be 100% compliant with HEP to increase overall level of function. Status at last note/certification: n/a  Status at discharge: not met    Goal: Pt will increase AROM knee flexion to 130* to match uninvolved side to improve pt ability to ambulate and participate in activities of choice.   Status at last note/certification: 564 degrees  Status at discharge: not met    Goal: Pt will increase Left SLS to >30 seconds to decrease falls risk   Status at last note/certification: 13 seconds  Status at discharge: not met    Goal: Pt will increase 30 sec STS test to >15 reps to show improved functional mobility.   Status at last note/certification: 81M  Status at discharge: not met    Goal: Pt will increased strength for flex and ext in left knee to 4/5 for improved functional ambulation tolerance and stair negotiation   Status at last note/certification:  4-/5  Status at discharge: not met    Pt. Was seen for 1 visit after last progress note and then did not return to PT. Goals were unable to be re-assessed secondary to unplanned D/C. Per last progress note \"Patient is progressing well in therapy. AROM in the left knee has increased to 125*. Patient continues with decreased strength for flexion and extension. Patient continues to report difficulty with steps outside her home. Patient continues to report mild to moderate pain in the left knee. Static balance is improving and patient is able to maintain balance in left SLS for 13 seconds. Patient was able to perform 12 sit to stands with 30 second STS. Patient reported being partially compliant with HEP.  FOTO score increased 26 points which indicates significant improvement in functional ability\"        ASSESSMENT/RECOMMENDATIONS:  [x]Discontinue therapy: []Patient has reached or is progressing toward set goals      [x]Patient is non-compliant or has abdicated      []Due to lack of appreciable progress towards set goals    Benja Villegas, PT 3/18/2021 8:54 AM

## 2021-03-19 ENCOUNTER — APPOINTMENT (OUTPATIENT)
Dept: PHYSICAL THERAPY | Age: 61
End: 2021-03-19
Payer: MEDICAID

## 2021-03-23 ENCOUNTER — OFFICE VISIT (OUTPATIENT)
Dept: ORTHOPEDIC SURGERY | Age: 61
End: 2021-03-23
Payer: MEDICAID

## 2021-03-23 VITALS
OXYGEN SATURATION: 99 % | WEIGHT: 176 LBS | HEART RATE: 89 BPM | BODY MASS INDEX: 30.05 KG/M2 | RESPIRATION RATE: 16 BRPM | TEMPERATURE: 97.3 F | HEIGHT: 64 IN

## 2021-03-23 DIAGNOSIS — M17.12 PRIMARY OSTEOARTHRITIS OF LEFT KNEE: Primary | ICD-10-CM

## 2021-03-23 PROCEDURE — 20611 DRAIN/INJ JOINT/BURSA W/US: CPT | Performed by: PHYSICIAN ASSISTANT

## 2021-03-23 NOTE — PROGRESS NOTES
Patient: Sola Eller                MRN: [id number]       SSN: [id number]  YOB: 1960        AGE: 61 y.o. SEX: female  Body mass index is 30.21 kg/m². PCP: Jaylyn Soto MD  03/23/21    Chief Complaint   Patient presents with    Knee Pain     left knee pain       HISTORY:  Sola Eller is a 61 y.o. female who is seen for reevaluation of Left knee here for 2nd injection of euflexxa. PROCEDURE:  Left knee Injection with Ultrasound Guidance    Indication:Left Knee pain/swelling    After sterile prep, 2 cc of Euflexxa were injected into the left Knee. Intra-articular Ultrasound images captured using [employer] Ultrasound machine using a frequency of 10 MHz with a linear transducer and scanned into patient's chart. [facility] - [facility]  OFFICE PROCEDURE PROGRESS NOTE        Chart reviewed for the following:   Boogie BAKER PA-C, have reviewed the History, Physical and updated the Allergic reactions for [address] performed immediately prior to start of procedure:   Boogie BAKER PA-C, have performed the following reviews on Sola Eller prior to the start of the procedure:            * Patient was identified by name and date of birth   * Agreement on procedure being performed was verified  * Risks and Benefits explained to the patient  * Procedure site verified and marked as necessary  * Patient was positioned for comfort  * Consent was signed and verified     Time: 11:14 AM    Date of procedure: 3/23/2021    Procedure performed by:  LEVI Gallegos    Provider assisted by: None     How tolerated by patient: tolerated the procedure well with no complications    Comments: none    IMPRESSION:     ICD-10-CM ICD-9-CM    1.  Primary osteoarthritis of left knee  M17.12 715.16 sodium hyaluronate (SUPARTZ FX/EUFLEXXA/HYALGAN) 10 mg/mL injection syrg 20 mg      DRAIN/INJECT LARGE JOINT/BURSA        PLAN:  Ms. Tawanna Hills will return in one week for her third Euflexxa injection.       [place] Sherwin, KENNETH [facility] and Spine Specialist

## 2021-03-24 ENCOUNTER — APPOINTMENT (OUTPATIENT)
Dept: PHYSICAL THERAPY | Age: 61
End: 2021-03-24
Payer: MEDICAID

## 2021-03-26 ENCOUNTER — APPOINTMENT (OUTPATIENT)
Dept: PHYSICAL THERAPY | Age: 61
End: 2021-03-26
Payer: MEDICAID

## 2021-03-30 ENCOUNTER — OFFICE VISIT (OUTPATIENT)
Dept: ORTHOPEDIC SURGERY | Age: 61
End: 2021-03-30
Payer: MEDICAID

## 2021-03-30 VITALS
HEIGHT: 64 IN | HEART RATE: 98 BPM | TEMPERATURE: 96.9 F | WEIGHT: 179.6 LBS | OXYGEN SATURATION: 100 % | BODY MASS INDEX: 30.66 KG/M2

## 2021-03-30 DIAGNOSIS — M17.12 PRIMARY OSTEOARTHRITIS OF LEFT KNEE: Primary | ICD-10-CM

## 2021-03-30 PROCEDURE — 20611 DRAIN/INJ JOINT/BURSA W/US: CPT | Performed by: PHYSICIAN ASSISTANT

## 2021-03-30 NOTE — PROGRESS NOTES
Patient: Lina Guevara                MRN: [id number]       SSN: [id number]  YOB: 1960        AGE: 61 y.o. SEX: female  Body mass index is 30.83 kg/m². PCP: Suzanne Skaggs MD  03/30/21    Chief Complaint   Patient presents with    Knee Pain     left knee       HISTORY:  Lina Guevara is a 61 y.o. female who is seen for reevaluation of Left knee here for 3rd and final injection of euflexxa. PROCEDURE:  Left knee Injection with Ultrasound Guidance    Indication:Left Knee pain/swelling    After sterile prep, 2 cc of Euflexxa were injected into the left Knee. Intra-articular Ultrasound images captured using [employer] Ultrasound machine using a frequency of 10 MHz with a linear transducer and scanned into patient's chart. [facility]  OFFICE PROCEDURE PROGRESS NOTE        Chart reviewed for the following:   Brian BAKER PA-C, have reviewed the History, Physical and updated the Allergic reactions for [address] performed immediately prior to start of procedure:   Brian BAKER PA-C, have performed the following reviews on Lina Guevara prior to the start of the procedure:            * Patient was identified by name and date of birth   * Agreement on procedure being performed was verified  * Risks and Benefits explained to the patient  * Procedure site verified and marked as necessary  * Patient was positioned for comfort  * Consent was signed and verified     Time: 10:58 AM       Date of procedure: 3/30/2021    Procedure performed by:  LEVI Rhodes    Provider assisted by: None     How tolerated by patient: tolerated the procedure well with no complications    Comments: none    IMPRESSION:     ICD-10-CM ICD-9-CM    1.  Primary osteoarthritis of left knee  M17.12 715.16 sodium hyaluronate (SUPARTZ FX/EUFLEXXA/HYALGAN) 10 mg/mL injection syrg 20 mg      ARTHROCENTESIS ASPIR&/INJ MAJOR JT/BURSA W/US        PLAN: Ms. Amadeo Simental has completed her Euflexxa injection series. she will return as needed.       KENNETH [facility] Sp[facility] Specialist

## 2021-06-16 ENCOUNTER — OFFICE VISIT (OUTPATIENT)
Dept: ORTHOPEDIC SURGERY | Age: 61
End: 2021-06-16
Payer: MEDICAID

## 2021-06-16 VITALS — HEART RATE: 88 BPM | TEMPERATURE: 97.1 F | BODY MASS INDEX: 29.87 KG/M2 | WEIGHT: 174 LBS

## 2021-06-16 DIAGNOSIS — S83.242D TEAR OF MEDIAL MENISCUS OF LEFT KNEE, CURRENT, UNSPECIFIED TEAR TYPE, SUBSEQUENT ENCOUNTER: Primary | ICD-10-CM

## 2021-06-16 DIAGNOSIS — M17.12 PRIMARY OSTEOARTHRITIS OF LEFT KNEE: ICD-10-CM

## 2021-06-16 PROCEDURE — 20611 DRAIN/INJ JOINT/BURSA W/US: CPT | Performed by: PHYSICIAN ASSISTANT

## 2021-06-16 PROCEDURE — 99214 OFFICE O/P EST MOD 30 MIN: CPT | Performed by: PHYSICIAN ASSISTANT

## 2021-06-16 RX ORDER — TRAMADOL HYDROCHLORIDE 50 MG/1
50 TABLET ORAL
Qty: 28 TABLET | Refills: 0 | Status: SHIPPED | OUTPATIENT
Start: 2021-06-16 | End: 2021-06-23

## 2021-06-16 RX ORDER — TRIAMCINOLONE ACETONIDE 40 MG/ML
40 INJECTION, SUSPENSION INTRA-ARTICULAR; INTRAMUSCULAR ONCE
Status: COMPLETED | OUTPATIENT
Start: 2021-06-16 | End: 2021-06-16

## 2021-06-16 RX ORDER — CELECOXIB 200 MG/1
200 CAPSULE ORAL 2 TIMES DAILY WITH MEALS
Qty: 60 CAPSULE | Refills: 2 | Status: SHIPPED | OUTPATIENT
Start: 2021-06-16

## 2021-06-16 RX ADMIN — TRIAMCINOLONE ACETONIDE 40 MG: 40 INJECTION, SUSPENSION INTRA-ARTICULAR; INTRAMUSCULAR at 11:34

## 2021-06-16 NOTE — PROGRESS NOTES
Stalin Miguel  1960   Chief Complaint   Patient presents with    Knee Pain     left        HISTORY OF PRESENT ILLNESS  Stalin Miguel is a 61 y.o. female who presents today for reevaluation of left knee pain. She is s/p left knee arthroscopic partial medial meniscectomy with grade IV condromalacia on 1/27/2021. She also finished the euflexxa series at the end of March. She was doing well until the last 2 weeks. She states she has had some vein closures by Dr. Slime Rubio on the left leg and noted that her pain started coming back in her knee. It is a constant dull throbbing pain with occasional sharp stabbing pains. She cannot sleep at night. She has been taking Tylenol and Advil without any relief. She notes that it feels very swollen especially at the end of the day. Pain is a 10/10. Patient denies any fever, chills, chest pain, shortness of breath or calf pain. The remainder of the review of systems is negative. There are no new illness or injuries to report since last seen in the office. No changes in medications, allergies, social or family history. PHYSICAL EXAM:   Visit Vitals  Pulse 88   Temp 97.1 °F (36.2 °C)   Wt 174 lb (78.9 kg)   BMI 29.87 kg/m²     The patient is a well-developed, well-nourished female   in no acute distress. The patient is alert and oriented times three. The patient is alert and oriented times three. Mood and affect are normal.  LYMPHATIC: lymph nodes are not enlarged and are within normal limits  SKIN: normal in color and non tender to palpation. There are no bruises or abrasions noted. NEUROLOGICAL: Motor sensory exam is within normal limits. Reflexes are equal bilaterally.  There is normal sensation to pinprick and light touch  MUSCULOSKELETAL:  Examination Left knee   Skin Intact   Range of motion 0-100   Effusion +   Medial joint line tenderness +   Lateral joint line tenderness -   Tenderness Pes Bursa -   Tenderness insertion MCL -   Tenderness insertion LCL -   Devangs -   Patella crepitus +   Patella grind -   Lachman -   Pivot shift -   Anterior drawer -   Posterior drawer -   Varus stress -   Valgus stress -   Neurovascular Intact   Calf Swelling and Tenderness to Palpation -   Aurora's Test -   Hamstring Cord Tightness -          PROCEDURE: Left knee Injection with Ultrasound Guidance    Indication:Left Knee pain/swelling    After sterile prep, 4 cc of Xylocaine and 1 cc of Kenalog were injected into the left Knee. Intra-articular Ultrasound images captured using [address] [place] [address] Loop Ultrasound machine using a frequency of 10 MHz with a linear transducer and scanned into patient's chart. [facility] - [facility]  OFFICE PROCEDURE PROGRESS NOTE        Chart reviewed for the following:  Piyush BAKER PA, have reviewed the History, Physical and updated the Allergic reactions for [address] performed immediately prior to start of procedure:  Piyush BAKER PA-C, have performed the following reviews on [facility] prior to the start of the procedure:            * Patient was identified by name and date of birth   * Agreement on procedure being performed was verified  * Risks and Benefits explained to the patient  * Procedure site verified and marked as necessary  * Patient was positioned for comfort  * Consent was signed and verified     Time: 11:29 AM    Date of procedure: 6/16/2021    Procedure performed by:  LEVI Craft    Provider assisted by: (see medication administration)    How tolerated by patient: tolerated the procedure well with no complications    Comments: none         IMPRESSION:      ICD-10-CM ICD-9-CM    1. Tear of medial meniscus of left knee, current, unspecified tear type, subsequent encounter  S83.242D V58.89 celecoxib (CeleBREX) 200 mg capsule     836.0 traMADoL (ULTRAM) 50 mg tablet   2.  Primary osteoarthritis of left knee  M17.12 715.16 celecoxib (CeleBREX) 200 mg capsule      traMADoL (ULTRAM) 50 mg tablet        PLAN:   1. Patient with acute exacerbation of degenerative arthritis left knee. We will inject the knee under ultrasound guidance with cortisone today. We will have her stop the Advil and place her on Celebrex. One-time prescription of tramadol given today. Risk factors include: n/a  2. Yes cortisone injection indicated today   3. No Physical/Occupational Therapy indicated today  4. No diagnostic test indicated today:   5. No durable medical equipment indicated today  6. No referral indicated today   7. Yes medications indicated today:   8. Yes Narcotic indicated today for short term acute pain secondary to extreme pain. Patient given pain medication for short term acute pain relief. Goal is to treat patient according to above plan and to ultimately have patient off all pain medications once appropriate. If chronic pain management is required beyond what is expected for current orthopedic problem, will refer patient to pain management.   was reviewed and will be reviewed with every medication refill request.       RTC 3 weeks if pain persists       KENNETH [facility] and Spine Specialist

## 2021-06-17 ENCOUNTER — TELEPHONE (OUTPATIENT)
Dept: ORTHOPEDIC SURGERY | Age: 61
End: 2021-06-17

## 2021-06-17 NOTE — TELEPHONE ENCOUNTER
PA is required for Celecoxib 200mg    Cover My Meds [place]:  Birgit SIMS Case ID: [id number] - Rx #: [id number]

## 2021-06-23 RX ORDER — ALBUTEROL SULFATE 90 UG/1
1 AEROSOL, METERED RESPIRATORY (INHALATION)
COMMUNITY

## 2021-06-23 RX ORDER — LEVETIRACETAM 500 MG/1
2000 TABLET, EXTENDED RELEASE ORAL DAILY
COMMUNITY

## 2021-06-23 NOTE — PERIOP NOTES
PRE-SURGICAL INSTRUCTIONS        Patient's Name:  Jenni Ron      OVVPP'R Date:  6/23/2021              Surgery Date:  7/2/2021                1. Do NOT eat or drink anything, including candy, gum, or ice chips after midnight on 07/01, unless you have specific instructions from your surgeon or anesthesia provider to do so.  2. You may brush your teeth before coming to the hospital.  3. No smoking 24 hours prior to the day of surgery. 4. No alcohol 24 hours prior to the day of surgery. 5. No recreational drugs for one week prior to the day of surgery. 6. Leave all valuables, including money/purse, at home. 7. Remove all jewelry, nail polish, acrylic nails, and makeup (including mascara); no lotions powders, deodorant, or perfume/cologne/after shave on the skin. 8. Glasses/contact lenses and dentures may be worn to the hospital.  They will be removed prior to surgery. 9. Call your doctor if symptoms of a cold or illness develop within 24-48 hours prior to your surgery. 10.  AN ADULT MUST DRIVE YOU HOME AFTER OUTPATIENT SURGERY. 11.  If you are having an outpatient procedure, please make arrangements for a responsible adult to be with you for 24 hours after your surgery. 12.  NO VISITORS in the hospital at this time for outpatient procedures. Exceptions may be made for surgical admissions, per nursing unit guidelines      Special Instructions:        Bring inhaler. Bring any pertinent legal medical records. Take these medications the morning of surgery with a sip of water:  Use Breo , take Keppra and may take Thyroid. Follow physician instructions about insulin. Follow physician instructions about stopping anticoagulants. Call office concerning  mg. Complete bowel prep per MD instructions. On the day of surgery, come in the main entrance of [facility]. Let the  at the desk know you are there for surgery.   A staff member will come escort you to the surgical area on the second floor. If you have any questions or concerns, please do not hesitate to call:     (Prior to the day of surgery) PAT department:  [phone]   (Day of surgery) Pre-Op department:  [phone]    These surgical instructions were reviewed with Deann James during the [facility] phone call.

## 2021-06-24 NOTE — DIABETES MGMT
INSULIN PUMP CONSULT/ Plan Of Care  How long have you had diabetes? About 2 years  How long have you had an insulin pump? One year  What is your blood glucose target?  mg/dl  How often do you usually test your blood glucose in a day? What was your most recent A1C and date?  10.2% in April 2021  Who is your endocrinologist?  Funmilayo Ansari  When was your last visit to your endocrinologist?  April 2021    INSULIN PUMP    Brand of pump and model #:   medtronic   Type of insulin used    Type of infusion set    What are your basal rate(s)? Pt does not know basal rates. What is your sensitivity factor? What is your insulin to carbohydrate ratio? States she does cover for carbs   What is your total daily dose?   unknown   What conventional insulin dose do you use if you pump were inoperable? (\"off pump plan\")      Do you often have hypoglycemia?  no  How do you treat hypoglycemia? n/a  Do you have any site problems?  no  Do you feel able and confident to manage your pump while here? Yes   Who helps you manage your insulin pump when you are not able? No     Instructed pt to wear her insulin pump to the hospital set at her basal rate. Informed pt that she would be signing an insulin pump agreement on arrival.  Instructed pt to bring an extra set of pump supplies with her to the hospital. Pt had no questions or concerns at this time.

## 2021-07-01 ENCOUNTER — ANESTHESIA EVENT (OUTPATIENT)
Dept: ENDOSCOPY | Age: 61
End: 2021-07-01
Payer: MEDICAID

## 2021-07-02 ENCOUNTER — HOSPITAL ENCOUNTER (OUTPATIENT)
Age: 61
Setting detail: OUTPATIENT SURGERY
Discharge: HOME | End: 2021-07-02
Attending: INTERNAL MEDICINE | Admitting: INTERNAL MEDICINE
Payer: MEDICAID

## 2021-07-02 ENCOUNTER — ANESTHESIA (OUTPATIENT)
Dept: ENDOSCOPY | Age: 61
End: 2021-07-02
Payer: MEDICAID

## 2021-07-02 VITALS
OXYGEN SATURATION: 95 % | RESPIRATION RATE: 20 BRPM | SYSTOLIC BLOOD PRESSURE: 108 MMHG | DIASTOLIC BLOOD PRESSURE: 67 MMHG | WEIGHT: 168 LBS | HEIGHT: 64 IN | HEART RATE: 75 BPM | TEMPERATURE: 97.4 F | BODY MASS INDEX: 28.68 KG/M2

## 2021-07-02 LAB
GLUCOSE BLD STRIP.AUTO-MCNC: 136 MG/DL (ref 70–110)
GLUCOSE BLD STRIP.AUTO-MCNC: 137 MG/DL (ref 70–110)

## 2021-07-02 PROCEDURE — 82962 GLUCOSE BLOOD TEST: CPT

## 2021-07-02 PROCEDURE — 00811 ANES LWR INTST NDSC NOS: CPT | Performed by: NURSE ANESTHETIST, CERTIFIED REGISTERED

## 2021-07-02 PROCEDURE — 74011250637 HC RX REV CODE- 250/637: Performed by: NURSE ANESTHETIST, CERTIFIED REGISTERED

## 2021-07-02 PROCEDURE — 77030008565 HC TBNG SUC IRR ERBE -B: Performed by: INTERNAL MEDICINE

## 2021-07-02 PROCEDURE — 88305 TISSUE EXAM BY PATHOLOGIST: CPT

## 2021-07-02 PROCEDURE — 74011250636 HC RX REV CODE- 250/636: Performed by: NURSE ANESTHETIST, CERTIFIED REGISTERED

## 2021-07-02 PROCEDURE — 76040000019: Performed by: INTERNAL MEDICINE

## 2021-07-02 PROCEDURE — 77030013992 HC SNR POLYP ENDOSC BSC -B: Performed by: INTERNAL MEDICINE

## 2021-07-02 PROCEDURE — 76060000031 HC ANESTHESIA FIRST 0.5 HR: Performed by: INTERNAL MEDICINE

## 2021-07-02 PROCEDURE — 00811 ANES LWR INTST NDSC NOS: CPT | Performed by: ANESTHESIOLOGY

## 2021-07-02 PROCEDURE — 2709999900 HC NON-CHARGEABLE SUPPLY: Performed by: INTERNAL MEDICINE

## 2021-07-02 PROCEDURE — 77030040934 HC CATH DIAG DXTERITY MEDT -A: Performed by: INTERNAL MEDICINE

## 2021-07-02 PROCEDURE — 77030021593 HC FCPS BIOP ENDOSC BSC -A: Performed by: INTERNAL MEDICINE

## 2021-07-02 RX ORDER — INSULIN LISPRO 100 [IU]/ML
INJECTION, SOLUTION INTRAVENOUS; SUBCUTANEOUS ONCE
Status: DISCONTINUED | OUTPATIENT
Start: 2021-07-02 | End: 2021-07-02 | Stop reason: HOSPADM

## 2021-07-02 RX ORDER — MAGNESIUM SULFATE 100 %
4 CRYSTALS MISCELLANEOUS AS NEEDED
Status: DISCONTINUED | OUTPATIENT
Start: 2021-07-02 | End: 2021-07-02 | Stop reason: HOSPADM

## 2021-07-02 RX ORDER — SODIUM CHLORIDE, SODIUM LACTATE, POTASSIUM CHLORIDE, CALCIUM CHLORIDE 600; 310; 30; 20 MG/100ML; MG/100ML; MG/100ML; MG/100ML
75 INJECTION, SOLUTION INTRAVENOUS CONTINUOUS
Status: DISCONTINUED | OUTPATIENT
Start: 2021-07-02 | End: 2021-07-02 | Stop reason: HOSPADM

## 2021-07-02 RX ORDER — FAMOTIDINE 20 MG/1
20 TABLET, FILM COATED ORAL ONCE
Status: COMPLETED | OUTPATIENT
Start: 2021-07-02 | End: 2021-07-02

## 2021-07-02 RX ORDER — LIDOCAINE HYDROCHLORIDE 10 MG/ML
0.1 INJECTION, SOLUTION EPIDURAL; INFILTRATION; INTRACAUDAL; PERINEURAL AS NEEDED
Status: DISCONTINUED | OUTPATIENT
Start: 2021-07-02 | End: 2021-07-02 | Stop reason: HOSPADM

## 2021-07-02 RX ORDER — DEXTROSE 50 % IN WATER (D50W) INTRAVENOUS SYRINGE
25-50 AS NEEDED
Status: DISCONTINUED | OUTPATIENT
Start: 2021-07-02 | End: 2021-07-02 | Stop reason: HOSPADM

## 2021-07-02 RX ORDER — PROPOFOL 10 MG/ML
INJECTION, EMULSION INTRAVENOUS AS NEEDED
Status: DISCONTINUED | OUTPATIENT
Start: 2021-07-02 | End: 2021-07-02 | Stop reason: HOSPADM

## 2021-07-02 RX ADMIN — PROPOFOL 20 MG: 10 INJECTION, EMULSION INTRAVENOUS at 07:38

## 2021-07-02 RX ADMIN — PROPOFOL 100 MG: 10 INJECTION, EMULSION INTRAVENOUS at 07:37

## 2021-07-02 RX ADMIN — PROPOFOL 20 MG: 10 INJECTION, EMULSION INTRAVENOUS at 07:42

## 2021-07-02 RX ADMIN — FAMOTIDINE 20 MG: 20 TABLET ORAL at 07:15

## 2021-07-02 RX ADMIN — PROPOFOL 10 MG: 10 INJECTION, EMULSION INTRAVENOUS at 07:39

## 2021-07-02 RX ADMIN — SODIUM CHLORIDE, SODIUM LACTATE, POTASSIUM CHLORIDE, AND CALCIUM CHLORIDE 75 ML/HR: 600; 310; 30; 20 INJECTION, SOLUTION INTRAVENOUS at 07:16

## 2021-07-02 NOTE — ANESTHESIA POSTPROCEDURE EVALUATION
Procedure(s):  COLONOSCOPY with polypectomy. MAC    Anesthesia Post Evaluation      Multimodal analgesia: multimodal analgesia used between 6 hours prior to anesthesia start to PACU discharge  Patient location during evaluation: bedside  Patient participation: complete - patient participated  Level of consciousness: awake  Pain score: 0  Pain management: adequate  Airway patency: patent  Anesthetic complications: no  Cardiovascular status: stable  Respiratory status: acceptable  Hydration status: acceptable  Post anesthesia nausea and vomiting:  controlled  Final Post Anesthesia Temperature Assessment:  Normothermia (36.0-37.5 degrees C)      INITIAL Post-op Vital signs:   Vitals Value Taken Time   /53 07/02/21 0759   Temp 36.4 °C (97.6 °F) 07/02/21 0749   Pulse 75 07/02/21 0803   Resp 16 07/02/21 0803   SpO2 100 % 07/02/21 0803   Vitals shown include unvalidated device data.

## 2021-07-02 NOTE — ANESTHESIA PREPROCEDURE EVALUATION
Relevant Problems   No relevant active problems       Anesthetic History   No history of anesthetic complications            Review of Systems / Medical History  Patient summary reviewed and pertinent labs reviewed    Pulmonary            Asthma : well controlled       Neuro/Psych     seizures: well controlled         Cardiovascular                  Exercise tolerance: >4 METS     GI/Hepatic/Renal  Within defined limits              Endo/Other    Diabetes: using insulin  Hypothyroidism: well controlled  Arthritis     Other Findings              Physical Exam    Airway  Mallampati: II  TM Distance: 4 - 6 cm  Neck ROM: normal range of motion   Mouth opening: Normal     Cardiovascular  Regular rate and rhythm,  S1 and S2 normal,  no murmur, click, rub, or gallop             Dental  No notable dental hx       Pulmonary  Breath sounds clear to auscultation               Abdominal  GI exam deferred       Other Findings            Anesthetic Plan    ASA: 3  Anesthesia type: MAC          Induction: Intravenous  Anesthetic plan and risks discussed with: Patient

## 2021-07-02 NOTE — PROGRESS NOTES
conducted a pre-surgery visit with Valery Quesada, who is a 61 y.o.,female. The  provided the following Interventions:  Initiated a relationship of care and support. Offered prayer and assurance of continued prayers on patient's behalf. Plan:  Chaplains will continue to follow and will provide pastoral care on an as needed/requested basis.  recommends bedside caregivers page  on duty if patient shows signs of acute spiritual or emotional distress.     Reise[address]   Board Certified [address] [place] [address]   [phone]
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
## 2021-07-02 NOTE — DISCHARGE INSTRUCTIONS
Patient Education   Patient Education        Colonoscopy: What to Expect at Home  Your Recovery  After a colonoscopy, you'll stay at the clinic for 1 to 2 hours until the medicines wear off. Then you can go home. But you'll need to arrange for a ride. Your doctor will tell you when you can eat and do your other usual activities. Your doctor will talk to you about when you'll need your next colonoscopy. Your doctor can help you decide how often you need to be checked. This will depend on the results of your test and your risk for colorectal cancer. After the test, you may be bloated or have gas pains. You may need to pass gas. If a biopsy was done or a polyp was removed, you may have streaks of blood in your stool (feces) for a few days. Problems such as heavy rectal bleeding may not occur until several weeks after the test. This isn't common. But it can happen after polyps are removed. This care sheet gives you a general idea about how long it will take for you to recover. But each person recovers at a different pace. Follow the steps below to get better as quickly as possible. How can you care for yourself at home? Activity    · Rest when you feel tired.     · You can do your normal activities when it feels okay to do so. Diet    · Follow your doctor's directions for eating.     · Unless your doctor has told you not to, drink plenty of fluids. This helps to replace the fluids that were lost during the colon prep.     · Do not drink alcohol. Medicines    · Your doctor will tell you if and when you can restart your medicines. He or she will also give you instructions about taking any new medicines.     · If you take aspirin or some other blood thinner, ask your doctor if and when to start taking it again.  Make sure that you understand exactly what your doctor wants you to do.     · If polyps were removed or a biopsy was done during the test, your doctor may tell you not to take aspirin or other anti-inflammatory medicines for a few days. These include ibuprofen (Advil, Motrin) and naproxen (Aleve). Other instructions    · For your safety, do not drive or operate machinery until the medicine wears off and you can think clearly. Your doctor may tell you not to drive or operate machinery until the day after your test.     · Do not sign legal documents or make major decisions until the medicine wears off and you can think clearly. The anesthesia can make it hard for you to fully understand what you are agreeing to. Follow-up care is a key part of your treatment and safety. Be sure to make and go to all appointments, and call your doctor if you are having problems. It's also a good idea to know your test results and keep a list of the medicines you take. When should you call for help? Call 911 anytime you think you may need emergency care. For example, call if:    · You passed out (lost consciousness).     · You pass maroon or bloody stools.     · You have trouble breathing. Call your doctor now or seek immediate medical care if:    · You have pain that does not get better after you take pain medicine.     · You are sick to your stomach or cannot drink fluids.     · You have new or worse belly pain.     · You have blood in your stools.     · You have a fever.     · You cannot pass stools or gas. Watch closely for changes in your health, and be sure to contact your doctor if you have any problems. Where can you learn more? Go to http://www.gray.com/  Enter E264 in the search box to learn more about \"Colonoscopy: What to Expect at Home. \"  Current as of: December 17, 2020               Content Version: 12.8  © 7936-3309 Healthwise, Incorporated. Care instructions adapted under license by [employer] (which disclaims liability or warranty for this information).  If you have questions about a medical condition or this instruction, always ask your healthcare professional. [address] any warranty or liability for your use of this information. Colon Polyps: Care Instructions  Your Care Instructions     Colon polyps are growths in the colon or the rectum. The cause of most colon polyps is not known, and most people who get them do not have any problems. But a certain kind can turn into cancer. For this reason, regular testing for colon polyps is important for people as they get older. It is also important for anyone who has an increased risk for colon cancer. Polyps are usually found through routine colon cancer screening tests. Although most colon polyps are not cancerous, they are usually removed and then tested for cancer. Screening for colon cancer saves lives because the cancer can usually be cured if it is caught early. If you have a polyp that is the type that can turn into cancer, you may need more tests to examine your entire colon. The doctor will remove any other polyps that he or she finds, and you will be tested more often. Follow-up care is a key part of your treatment and safety. Be sure to make and go to all appointments, and call your doctor if you are having problems. It's also a good idea to know your test results and keep a list of the medicines you take. How can you care for yourself at home? Regular exams to look for colon polyps are the best way to prevent polyps from turning into colon cancer. These can include stool tests, sigmoidoscopy, colonoscopy, and CT colonography. Talk with your doctor about a testing schedule that is right for you. To prevent polyps  There is no home treatment that can prevent colon polyps. But these steps may help lower your risk for cancer. · Stay active. Being active can help you get to and stay at a healthy weight. Try to exercise on most days of the week. Walking is a good choice. · Eat well.  Choose a variety of vegetables, fruits, legumes (such as peas and beans), fish, poultry, and whole grains. · Do not smoke. If you need help quitting, talk to your doctor about stop-smoking programs and medicines. These can increase your chances of quitting for good. · If you drink alcohol, limit how much you drink. Limit alcohol to 2 drinks a day for men and 1 drink a day for women. When should you call for help? Call your doctor now or seek immediate medical care if:    · You have severe belly pain.     · Your stools are maroon or very bloody. Watch closely for changes in your health, and be sure to contact your doctor if:    · You have a fever.     · You have nausea or vomiting.     · You have a change in bowel habits (new constipation or diarrhea).     · Your symptoms get worse or are not improving as expected. Where can you learn more? Go to http://www.solares.com/  Enter C571 in the search box to learn more about \"Colon Polyps: Care Instructions. \"  Current as of: December 17, 2020               Content Version: 12.8  © 2006-2021 [employer]. Care instructions adapted under license by [employer] (which disclaims liability or warranty for this information). If you have questions about a medical condition or this instruction, always ask your healthcare professional. [place] [address] any warranty or liability for your use of this information. Patient Education        High-Fiber Diet: Care Instructions  Your Care Instructions     A high-fiber diet may help you relieve constipation and feel less bloated. Your doctor and dietitian will help you make a high-fiber eating plan based on your personal needs. The plan will include the things you like to eat. It will also make sure that you get 30 grams of fiber a day. Before you make changes to the way you eat, be sure to talk with your doctor or dietitian. Follow-up care is a key part of your treatment and safety.  Be sure to make and go to all appointments, and call your doctor if you are having problems. It's also a good idea to know your test results and keep a list of the medicines you take. How can you care for yourself at home? · You can increase how much fiber you get if you eat more of certain foods. These foods include:  ? Whole-grain breads and cereals. ? Fruits, such as pears, apples, and peaches. Eat the skins, peels, and seeds, if you can.  ? Vegetables, such as broccoli, cabbage, spinach, carrots, asparagus, and squash. ? Starchy vegetables. These include potatoes with skins, kidney beans, and lima beans. · Take a fiber supplement every day if your doctor recommends it. Examples are Benefiber, Citrucel, FiberCon, and Metamucil. Ask your doctor how much to take. · Drink plenty of fluids. If you have kidney, heart, or liver disease and have to limit fluids, talk with your doctor before you increase the amount of fluids you drink. · Get some exercise every day. Exercise helps stool move through the colon. It also helps prevent constipation. · Keep a food diary. Try to notice and write down what foods cause gas, pain, or other symptoms. Then you can avoid these foods. Where can you learn more? Go to http://www.gray.com/  Enter N422 in the search box to learn more about \"High-Fiber Diet: Care Instructions. \"  Current as of: December 17, 2020               Content Version: 12.8  © 2006-2021 [employer]. Care instructions adapted under license by [employer] (which disclaims liability or warranty for this information). If you have questions about a medical condition or this instruction, always ask your healthcare professional. [address] any warranty or liability for your use of this information.

## 2021-07-02 NOTE — PROCEDURES
[place]  [facility], [address]      Brief Procedure Note    Sugar Verdin  1960  [id number]    Date of Procedure: 7/2/2021    Preoperative diagnosis: Type 1 diabetes mellitus with insulin pump:  250.01 - E10.9  Colorectal cancer screening:  V76.51 - Z12.11  Encounter for screening for other viral diseases:  V73.0 - Z11.59  Body mass index 29.0 - 29.9, adult:  V85.25 - Z68.29  Seizure disorder:  345.90 - G40.909  Long-term (current) uuse of antiepileptic drug (AED):  V58.69 - Z79.84  Long-term (current) use of aspirin:  V58.66 - Z79.82  Cigarette smoker:  V15.82 - F17.210    Postoperative diagnosis:  ascending polyp    Type of Anesthesia: MAC (monitered anesthesia care)    Description of Findings: same as post op dx    Procedure: Procedure(s):  COLONOSCOPY with polypectomy    :  Dr. Usman Anderson MD    Assistant(s): [unfilled]    Type of Anesthesia:MAC     EBL:None, no implants.     Specimens:   ID Type Source Tests Collected by Time Destination   1 : ascending polyp Preservative Colon, Ascending  Cleola Goltz, MD 7/2/2021 2125 Pathology       Findings: See printed and scanned procedure note    Complications: None    Dr. Usman Anderson MD  7/2/2021  7:51 AM

## 2021-07-27 ENCOUNTER — HOSPITAL ENCOUNTER (OUTPATIENT)
Dept: GENERAL RADIOLOGY | Age: 61
Discharge: HOME | End: 2021-07-27
Payer: MEDICAID

## 2021-07-27 ENCOUNTER — TRANSCRIBE ORDER (OUTPATIENT)
Dept: REGISTRATION | Age: 61
End: 2021-07-27

## 2021-07-27 ENCOUNTER — HOSPITAL ENCOUNTER (OUTPATIENT)
Dept: LAB | Age: 61
Discharge: HOME | End: 2021-07-27

## 2021-07-27 ENCOUNTER — HOSPITAL ENCOUNTER (OUTPATIENT)
Dept: LAB | Age: 61
Discharge: HOME | End: 2021-07-27
Payer: MEDICAID

## 2021-07-27 DIAGNOSIS — Z01.818 OTHER SPECIFIED PRE-OPERATIVE EXAMINATION: ICD-10-CM

## 2021-07-27 DIAGNOSIS — Z01.818 OTHER SPECIFIED PRE-OPERATIVE EXAMINATION: Primary | ICD-10-CM

## 2021-07-27 LAB
ATRIAL RATE: 82 BPM
CALCULATED P AXIS, ECG09: 50 DEGREES
CALCULATED R AXIS, ECG10: 38 DEGREES
CALCULATED T AXIS, ECG11: 8 DEGREES
DIAGNOSIS, 93000: NORMAL
P-R INTERVAL, ECG05: 148 MS
Q-T INTERVAL, ECG07: 374 MS
QRS DURATION, ECG06: 86 MS
QTC CALCULATION (BEZET), ECG08: 436 MS
VENTRICULAR RATE, ECG03: 82 BPM
XX-LABCORP SPECIMEN COL,LCBCF: NORMAL

## 2021-07-27 PROCEDURE — 71046 X-RAY EXAM CHEST 2 VIEWS: CPT

## 2021-07-27 PROCEDURE — 99001 SPECIMEN HANDLING PT-LAB: CPT

## 2021-07-27 PROCEDURE — 93005 ELECTROCARDIOGRAM TRACING: CPT

## 2021-08-02 RX ORDER — TRAMADOL HYDROCHLORIDE 50 MG/1
50 TABLET ORAL
COMMUNITY

## 2021-08-02 NOTE — PERIOP NOTES
PRE-SURGICAL INSTRUCTIONS        Patient's Name:  Colton Hill      [id number] Date:  8/2/2021              Surgery Date:  8/9/2021                1. Do NOT eat or drink anything, including candy, gum, or ice chips after midnight on 8/8/2021, unless you have specific instructions from your surgeon or anesthesia provider to do so.  2. You may brush your teeth before coming to the hospital.  3. No smoking 24 hours prior to the day of surgery. 4. No alcohol 24 hours prior to the day of surgery. 5. No recreational drugs for one week prior to the day of surgery. 6. Leave all valuables, including money/purse, at home. 7. Remove all jewelry, nail polish, acrylic nails, and makeup (including mascara); no lotions powders, deodorant, or perfume/cologne/after shave on the skin. 8. Glasses/contact lenses and dentures may be worn to the hospital.  They will be removed prior to surgery. 9. Call your doctor if symptoms of a cold or illness develop within 24-48 hours prior to your surgery. 10.  AN ADULT MUST DRIVE YOU HOME AFTER OUTPATIENT SURGERY. 11.  If you are having an outpatient procedure, please make arrangements for a responsible adult to be with you for 24 hours after your surgery. 12.  One visitor allowed for outpatient procedures . Exceptions may be made for surgical admissions, per nursing unit guidelines      Special Instructions:      Bring list of CURRENT medications. Bring inhaler. Bring COVID vaccination card  Bring any pertinent legal medical records. Take these medications the morning of surgery with a sip of water:  Seizure medication  Follow physician/diabetic team instructions about insulin pump. Follow physician instructions about stopping Aspirin, Celebrex. On the day of surgery, come in the main entrance of DR. JOSETTE BAZZI. Let the  at the desk know you are there for surgery. A staff member will come escort you to the surgical area on the second floor.     If you have any questions or concerns, please do not hesitate to call:     (Prior to the day of surgery) [facility] department:  [phone]   (Day of surgery) Pre-Op department:  [phone]    These surgical instructions were reviewed with patient during the PAT phone call.

## 2021-08-06 ENCOUNTER — ANESTHESIA EVENT (OUTPATIENT)
Dept: SURGERY | Age: 61
End: 2021-08-06
Payer: MEDICAID

## 2021-08-09 ENCOUNTER — HOSPITAL ENCOUNTER (OUTPATIENT)
Age: 61
Setting detail: OUTPATIENT SURGERY
Discharge: HOME | End: 2021-08-09
Attending: PODIATRIST | Admitting: PODIATRIST
Payer: MEDICAID

## 2021-08-09 ENCOUNTER — ANESTHESIA (OUTPATIENT)
Dept: SURGERY | Age: 61
End: 2021-08-09
Payer: MEDICAID

## 2021-08-09 VITALS
RESPIRATION RATE: 20 BRPM | BODY MASS INDEX: 29.02 KG/M2 | HEIGHT: 64 IN | WEIGHT: 170 LBS | OXYGEN SATURATION: 98 % | DIASTOLIC BLOOD PRESSURE: 77 MMHG | TEMPERATURE: 97.2 F | SYSTOLIC BLOOD PRESSURE: 133 MMHG | HEART RATE: 75 BPM

## 2021-08-09 DIAGNOSIS — M79.671 RIGHT FOOT PAIN: Primary | ICD-10-CM

## 2021-08-09 LAB
GLUCOSE BLD STRIP.AUTO-MCNC: 113 MG/DL (ref 70–110)
GLUCOSE BLD STRIP.AUTO-MCNC: 126 MG/DL (ref 70–110)

## 2021-08-09 PROCEDURE — 77030040361 HC SLV COMPR DVT MDII -B: Performed by: PODIATRIST

## 2021-08-09 PROCEDURE — 74011000250 HC RX REV CODE- 250: Performed by: PODIATRIST

## 2021-08-09 PROCEDURE — 74011250637 HC RX REV CODE- 250/637: Performed by: PODIATRIST

## 2021-08-09 PROCEDURE — 77030002916 HC SUT ETHLN J&J -A: Performed by: PODIATRIST

## 2021-08-09 PROCEDURE — 2709999900 HC NON-CHARGEABLE SUPPLY: Performed by: PODIATRIST

## 2021-08-09 PROCEDURE — C9290 INJ, BUPIVACAINE LIPOSOME: HCPCS | Performed by: PODIATRIST

## 2021-08-09 PROCEDURE — 74011250636 HC RX REV CODE- 250/636: Performed by: PODIATRIST

## 2021-08-09 PROCEDURE — 74011000250 HC RX REV CODE- 250: Performed by: NURSE ANESTHETIST, CERTIFIED REGISTERED

## 2021-08-09 PROCEDURE — 76210000021 HC REC RM PH II 0.5 TO 1 HR: Performed by: PODIATRIST

## 2021-08-09 PROCEDURE — 76010000153 HC OR TIME 1.5 TO 2 HR: Performed by: PODIATRIST

## 2021-08-09 PROCEDURE — 76060000034 HC ANESTHESIA 1.5 TO 2 HR: Performed by: PODIATRIST

## 2021-08-09 PROCEDURE — 88305 TISSUE EXAM BY PATHOLOGIST: CPT

## 2021-08-09 PROCEDURE — 77030040922 HC BLNKT HYPOTHRM STRY -A: Performed by: PODIATRIST

## 2021-08-09 PROCEDURE — 88311 DECALCIFY TISSUE: CPT

## 2021-08-09 PROCEDURE — 88304 TISSUE EXAM BY PATHOLOGIST: CPT

## 2021-08-09 PROCEDURE — 74011250636 HC RX REV CODE- 250/636: Performed by: NURSE ANESTHETIST, CERTIFIED REGISTERED

## 2021-08-09 PROCEDURE — 01480 ANES OPEN PX LOWER L/A/F NOS: CPT | Performed by: ANESTHESIOLOGY

## 2021-08-09 PROCEDURE — 82962 GLUCOSE BLOOD TEST: CPT

## 2021-08-09 PROCEDURE — 77030031139 HC SUT VCRL2 J&J -A: Performed by: PODIATRIST

## 2021-08-09 PROCEDURE — 74011250637 HC RX REV CODE- 250/637: Performed by: NURSE ANESTHETIST, CERTIFIED REGISTERED

## 2021-08-09 PROCEDURE — 77030013480 HC CUF TRNQT J&J -B: Performed by: PODIATRIST

## 2021-08-09 PROCEDURE — 01480 ANES OPEN PX LOWER L/A/F NOS: CPT | Performed by: NURSE ANESTHETIST, CERTIFIED REGISTERED

## 2021-08-09 PROCEDURE — 76210000006 HC OR PH I REC 0.5 TO 1 HR: Performed by: PODIATRIST

## 2021-08-09 PROCEDURE — 74011000258 HC RX REV CODE- 258: Performed by: NURSE ANESTHETIST, CERTIFIED REGISTERED

## 2021-08-09 RX ORDER — SODIUM CHLORIDE 0.9 % (FLUSH) 0.9 %
5-40 SYRINGE (ML) INJECTION AS NEEDED
Status: DISCONTINUED | OUTPATIENT
Start: 2021-08-09 | End: 2021-08-09 | Stop reason: HOSPADM

## 2021-08-09 RX ORDER — MAGNESIUM SULFATE 100 %
4 CRYSTALS MISCELLANEOUS AS NEEDED
Status: DISCONTINUED | OUTPATIENT
Start: 2021-08-09 | End: 2021-08-09 | Stop reason: HOSPADM

## 2021-08-09 RX ORDER — ONDANSETRON 2 MG/ML
INJECTION INTRAMUSCULAR; INTRAVENOUS AS NEEDED
Status: DISCONTINUED | OUTPATIENT
Start: 2021-08-09 | End: 2021-08-09 | Stop reason: HOSPADM

## 2021-08-09 RX ORDER — SODIUM CHLORIDE, SODIUM LACTATE, POTASSIUM CHLORIDE, CALCIUM CHLORIDE 600; 310; 30; 20 MG/100ML; MG/100ML; MG/100ML; MG/100ML
25 INJECTION, SOLUTION INTRAVENOUS CONTINUOUS
Status: DISCONTINUED | OUTPATIENT
Start: 2021-08-09 | End: 2021-08-09 | Stop reason: HOSPADM

## 2021-08-09 RX ORDER — DEXTROSE 50 % IN WATER (D50W) INTRAVENOUS SYRINGE
25-50 AS NEEDED
Status: DISCONTINUED | OUTPATIENT
Start: 2021-08-09 | End: 2021-08-09 | Stop reason: HOSPADM

## 2021-08-09 RX ORDER — ONDANSETRON 2 MG/ML
4 INJECTION INTRAMUSCULAR; INTRAVENOUS ONCE
Status: DISCONTINUED | OUTPATIENT
Start: 2021-08-09 | End: 2021-08-09 | Stop reason: HOSPADM

## 2021-08-09 RX ORDER — PROPOFOL 10 MG/ML
VIAL (ML) INTRAVENOUS
Status: DISCONTINUED | OUTPATIENT
Start: 2021-08-09 | End: 2021-08-09 | Stop reason: HOSPADM

## 2021-08-09 RX ORDER — SODIUM CHLORIDE 0.9 % (FLUSH) 0.9 %
5-40 SYRINGE (ML) INJECTION EVERY 8 HOURS
Status: DISCONTINUED | OUTPATIENT
Start: 2021-08-09 | End: 2021-08-09 | Stop reason: HOSPADM

## 2021-08-09 RX ORDER — SODIUM CHLORIDE, SODIUM LACTATE, POTASSIUM CHLORIDE, CALCIUM CHLORIDE 600; 310; 30; 20 MG/100ML; MG/100ML; MG/100ML; MG/100ML
50 INJECTION, SOLUTION INTRAVENOUS CONTINUOUS
Status: DISCONTINUED | OUTPATIENT
Start: 2021-08-09 | End: 2021-08-09 | Stop reason: HOSPADM

## 2021-08-09 RX ORDER — FENTANYL CITRATE 50 UG/ML
INJECTION, SOLUTION INTRAMUSCULAR; INTRAVENOUS AS NEEDED
Status: DISCONTINUED | OUTPATIENT
Start: 2021-08-09 | End: 2021-08-09 | Stop reason: HOSPADM

## 2021-08-09 RX ORDER — HYDROMORPHONE HYDROCHLORIDE 1 MG/ML
0.5 INJECTION, SOLUTION INTRAMUSCULAR; INTRAVENOUS; SUBCUTANEOUS AS NEEDED
Status: DISCONTINUED | OUTPATIENT
Start: 2021-08-09 | End: 2021-08-09 | Stop reason: HOSPADM

## 2021-08-09 RX ORDER — OXYCODONE AND ACETAMINOPHEN 5; 325 MG/1; MG/1
1 TABLET ORAL
Status: COMPLETED | OUTPATIENT
Start: 2021-08-09 | End: 2021-08-09

## 2021-08-09 RX ORDER — FAMOTIDINE 20 MG/1
20 TABLET, FILM COATED ORAL ONCE
Status: COMPLETED | OUTPATIENT
Start: 2021-08-09 | End: 2021-08-09

## 2021-08-09 RX ORDER — MIDAZOLAM HYDROCHLORIDE 1 MG/ML
INJECTION, SOLUTION INTRAMUSCULAR; INTRAVENOUS AS NEEDED
Status: DISCONTINUED | OUTPATIENT
Start: 2021-08-09 | End: 2021-08-09 | Stop reason: HOSPADM

## 2021-08-09 RX ORDER — PROPOFOL 10 MG/ML
INJECTION, EMULSION INTRAVENOUS AS NEEDED
Status: DISCONTINUED | OUTPATIENT
Start: 2021-08-09 | End: 2021-08-09 | Stop reason: HOSPADM

## 2021-08-09 RX ORDER — PHENYLEPHRINE HCL IN 0.9% NACL 1 MG/10 ML
SYRINGE (ML) INTRAVENOUS AS NEEDED
Status: DISCONTINUED | OUTPATIENT
Start: 2021-08-09 | End: 2021-08-09 | Stop reason: HOSPADM

## 2021-08-09 RX ORDER — LIDOCAINE HYDROCHLORIDE 10 MG/ML
0.1 INJECTION, SOLUTION EPIDURAL; INFILTRATION; INTRACAUDAL; PERINEURAL AS NEEDED
Status: DISCONTINUED | OUTPATIENT
Start: 2021-08-09 | End: 2021-08-09 | Stop reason: HOSPADM

## 2021-08-09 RX ORDER — INSULIN LISPRO 100 [IU]/ML
INJECTION, SOLUTION INTRAVENOUS; SUBCUTANEOUS ONCE
Status: DISCONTINUED | OUTPATIENT
Start: 2021-08-09 | End: 2021-08-09 | Stop reason: HOSPADM

## 2021-08-09 RX ADMIN — PROPOFOL 50 MG: 10 INJECTION, EMULSION INTRAVENOUS at 07:45

## 2021-08-09 RX ADMIN — Medication 50 MCG: at 08:31

## 2021-08-09 RX ADMIN — FENTANYL CITRATE 50 MCG: 50 INJECTION, SOLUTION INTRAMUSCULAR; INTRAVENOUS at 08:00

## 2021-08-09 RX ADMIN — DEXMEDETOMIDINE HYDROCHLORIDE 5 MCG: 100 INJECTION, SOLUTION, CONCENTRATE INTRAVENOUS at 07:38

## 2021-08-09 RX ADMIN — FENTANYL CITRATE 50 MCG: 50 INJECTION, SOLUTION INTRAMUSCULAR; INTRAVENOUS at 07:42

## 2021-08-09 RX ADMIN — PROPOFOL 70 MCG/KG/MIN: 10 INJECTION, EMULSION INTRAVENOUS at 07:46

## 2021-08-09 RX ADMIN — FAMOTIDINE 20 MG: 20 TABLET ORAL at 06:49

## 2021-08-09 RX ADMIN — OXYCODONE HYDROCHLORIDE AND ACETAMINOPHEN 1 TABLET: 5; 325 TABLET ORAL at 09:57

## 2021-08-09 RX ADMIN — ONDANSETRON 4 MG: 2 INJECTION INTRAMUSCULAR; INTRAVENOUS at 08:05

## 2021-08-09 RX ADMIN — Medication 50 MCG: at 08:22

## 2021-08-09 RX ADMIN — MIDAZOLAM HYDROCHLORIDE 2 MG: 2 INJECTION, SOLUTION INTRAMUSCULAR; INTRAVENOUS at 07:34

## 2021-08-09 RX ADMIN — WATER 2 G: 1 INJECTION INTRAMUSCULAR; INTRAVENOUS; SUBCUTANEOUS at 07:40

## 2021-08-09 RX ADMIN — SODIUM CHLORIDE, SODIUM LACTATE, POTASSIUM CHLORIDE, AND CALCIUM CHLORIDE 25 ML/HR: 600; 310; 30; 20 INJECTION, SOLUTION INTRAVENOUS at 06:48

## 2021-08-09 NOTE — ANESTHESIA POSTPROCEDURE EVALUATION
Procedure(s):  REMOVAL LARGE BONE AND CYST RIGHT FOOT/REMOVAL BONE AND JOINT FOURTH TOE ALL RIGHT FOOT. MAC    Anesthesia Post Evaluation      Multimodal analgesia: multimodal analgesia used between 6 hours prior to anesthesia start to PACU discharge  Patient location during evaluation: PACU  Patient participation: complete - patient participated  Level of consciousness: awake  Pain score: 3  Pain management: adequate  Airway patency: patent  Anesthetic complications: no  Cardiovascular status: acceptable  Respiratory status: acceptable  Hydration status: acceptable  Post anesthesia nausea and vomiting:  none  Final Post Anesthesia Temperature Assessment:  Normothermia (36.0-37.5 degrees C)      INITIAL Post-op Vital signs:   Vitals Value Taken Time   /75 08/09/21 0946   Temp 36.3 °C (97.3 °F) 08/09/21 0916   Pulse 79 08/09/21 0947   Resp 15 08/09/21 0947   SpO2 95 % 08/09/21 0947   Vitals shown include unvalidated device data.

## 2021-08-09 NOTE — H&P
Update History & Physical    The Patient's History and Physical of August N/A,   surgery was reviewed with the patient and I examined the patient. There was no change. The surgical site was confirmed by the patient and me. Plan:  The risk, benefits, expected outcome, and alternative to the recommended procedure have been discussed with the patient. Patient understands and wants to proceed with the procedure.     Electronically signed by Kodi Green DPM on 8/9/2021 at 7:32 AM

## 2021-08-09 NOTE — PROGRESS NOTES
conducted a pre-surgery visit with Trupti Perez, who is a 64 y.o.,female. The  provided the following Interventions:  Initiated a relationship of care and support. Plan:  Chaplains will continue to follow and will provide pastoral care on an as needed/requested basis.  recommends bedside caregivers page  on duty if patient shows signs of acute spiritual or emotional distress.     [address]  [phone]

## 2021-08-09 NOTE — DISCHARGE INSTRUCTIONS
PARTIAL WEIGHT BEARING WITH CRUTCHES      DISCHARGE SUMMARY from Nurse    PATIENT INSTRUCTIONS:    After general anesthesia or intravenous sedation, for 24 hours or while taking prescription Narcotics:  · Limit your activities  · Do not drive and operate hazardous machinery  · Do not make important personal or business decisions  · Do  not drink alcoholic beverages  · If you have not urinated within 8 hours after discharge, please contact your surgeon on call. Report the following to your surgeon:  · Excessive pain, swelling, redness or odor of or around the surgical area  · Temperature over 100.5  · Nausea and vomiting lasting longer than 4 hours or if unable to take medications  · Any signs of decreased circulation or nerve impairment to extremity: change in color, persistent  numbness, tingling, coldness or increase pain  · Any questions    Patient Education        Learning About How to Use Crutches  Overview  Crutches can help you walk when you have an injured hip, leg, knee, ankle, or foot. Your doctor will tell you how much weight--if any--you can put on your leg. Be sure your crutches fit you. When you stand up in your normal posture, there should be space for two or three fingers between the top of the crutch and your armpit. When you let your hands hang down, the hand  should be at your wrists. When you put your hands on the hand , your elbows should be slightly bent. To stay safe when using crutches:  · Look straight ahead, not down at your feet. · Clear away small rugs, cords, or anything else that could cause you to trip, slip, or fall. · Be very careful around pets and small children. They can get in your path when you least expect it. · Be sure the rubber tips on your crutches are clean and in good condition to help prevent slipping. · Avoid slick conditions, such as wet floors and snowy or icy driveways. In bad weather, be extra careful on curbs and steps.   How to use crutches  Getting ready to walk   1. Bend your elbows slightly. Press the padded top parts of the crutches against your sides, under your armpits. 2. If you have been told not to put any weight on your injured leg, keep that leg bent and off the ground. How to walk with crutches when you can put weight on the injured leg   Crutches allow you to take all the weight off of one leg. They can also be used as an added support if you have some injury or condition of both legs. Here's how to walk with crutches when you're able to put weight on your weak or injured leg. Be sure your crutches fit you. · When you stand up in your normal posture, there should be space for two or three fingers between the top of the crutch and your underarm. · When you let your hands hang down, the hand  should be at your wrists. · When you put your hands on the hand , your elbows should be slightly bent. 1. Put both crutches about 12 inches in front of you. 2. Put your weight on the handgrips, not on the pads under your arms. 3. Move your weak or injured leg forward so it's almost even with the crutches. 4. Bring your good leg up, so it's even with your weak or injured leg. 5. Move your crutches about 12 inches in front of you, and start the next step. The crutches and your feet should form a triangle. Hold the crutches close enough to your body so you can push straight down on them, but leave room between the crutches for your body to pass through. Don't lean forward to reach farther. Constant pressure against your underarms can cause numbness. When you're confident using the crutches, you can move the crutches and your injured leg at the same time. Then push straight down on the crutches as you step past the crutches with your strong leg, as you would in normal walking. Take small steps. Use ramps and elevators when you can. Sitting down   1. To sit, back up to the chair.  Use one hand to hold both crutches by the handgrips, beside your injured leg. With the other hand, hold onto the seat and slowly lower yourself onto the chair. 2. Lay the crutches on the ground near your chair. If you prop them up, they may fall over. Getting up from a chair   1. To get up from a chair,  the crutches and put them in one hand beside your injured leg. 2. Put your weight on the handgrips of the crutches and on your strong leg to stand up. How to go up and down stairs using crutches   Here's how to go up and down stairs using crutches. Try this first with another person nearby to steady you if needed. 1. Stand near the edge of the stairs. 2. Go up or down the stairs. · If you are going up, step up with your stronger leg. Then bring the crutches and your weak or injured leg to the upper step. · If you are going down, put your crutches and your weak or injured leg on the lower step. Then bring your stronger leg down to the lower step. Remember \"up with the good, and down with the bad\" to help you lead with the correct leg. Crutches: How to go up and down stairs with handrails   If the stairs have a good sturdy handrail, you can hold it with one hand and your crutches together in the other hand. Here's how. Try this first with another person nearby to steady you if needed. If the stairs have a handrail but you don't think it's sturdy enough, use the crutches normally, holding one in each hand. 1. Stand near the edge of the stairs. 2. Put both crutches under the arm opposite the handrail. 3. Use the hand opposite the handrail to hold both crutches by the handgrips. 4. Hold onto the handrail as you go up or down. 5. Go up or down the stairs. · If you are going up, step up with your stronger leg. Then bring the crutches and your weak or injured leg to the upper step. · If you are going down, put your crutches and your weak or injured leg on the lower step.  Then bring your stronger leg down to the lower step.  Remember \"up with the good, down with the bad\" to help you lead with the correct leg. Follow-up care is a key part of your treatment and safety. Be sure to make and go to all appointments, and call your doctor if you are having problems. It's also a good idea to know your test results and keep a list of the medicines you take. Where can you learn more? Go to http://www.gray.com/  Enter G273 in the search box to learn more about \"Learning About How to Use Crutches. \"  Current as of: November 16, 2020               Content Version: 12.8  © 2006-2021 [employer]. Care instructions adapted under license by [employer] (which disclaims liability or warranty for this information). If you have questions about a medical condition or this instruction, always ask your healthcare professional. [facility] any warranty or liability for your use of this information.

## 2021-08-09 NOTE — ANESTHESIA PREPROCEDURE EVALUATION
Relevant Problems   No relevant active problems       Anesthetic History   No history of anesthetic complications            Review of Systems / Medical History  Patient summary reviewed and pertinent labs reviewed    Pulmonary          Smoker  Asthma : well controlled       Neuro/Psych     seizures: well controlled    Headaches    Comments: Chronic pain Cardiovascular                  Exercise tolerance: >4 METS     GI/Hepatic/Renal  Within defined limits              Endo/Other    Diabetes: well controlled, type 2  Hypothyroidism: well controlled       Other Findings              Physical Exam    Airway  Mallampati: I  TM Distance: > 6 cm  Neck ROM: normal range of motion   Mouth opening: Normal     Cardiovascular  Regular rate and rhythm,  S1 and S2 normal,  no murmur, click, rub, or gallop             Dental  No notable dental hx       Pulmonary      Decreased breath sounds: bilateral           Abdominal  GI exam deferred       Other Findings            Anesthetic Plan    ASA: 3  Anesthesia type: MAC          Induction: Intravenous  Anesthetic plan and risks discussed with: Patient

## 2021-08-10 NOTE — OP NOTES
[address] [place] [address]   OPERATIVE REPORT    Name:  Cody Kang  MR#:   [id number]  :  1960  ACCOUNT #:  [de-identified]  DATE OF SERVICE:  2021    PREOPERATIVE DIAGNOSES:  Hammertoe, right fourth toe; tarsal exostosis, possible ganglion cyst, dorsum, right foot. POSTOPERATIVE DIAGNOSES:  Hammertoe, right fourth toe; tarsal exostosis, possible ganglion cyst, dorsum, right foot. PROCEDURE PERFORMED:  Partial removal of bone and joint, fourth toe; radical excision of tarsal exostosis, right foot. SURGEON:  Ruslan Moeller DPM    ASSISTANT:  0    ANESTHESIA:  MAC.    COMPLICATIONS:  0.    SPECIMENS REMOVED:  0.    IMPLANTS:  0.    ESTIMATED BLOOD LOSS:  0.    DESCRIPTION OF PROCEDURE:  On 2021, the patient was placed on the operating table in supine position. After adequate induction of MAC anesthesia, right lower extremity was prepped and draped in usual sterile fashion. Under sterile ankle tourniquet hemostasis, attention was directed to the right foot. Two similar toe incisions were accomplished on the dorsum of the fourth toe and skin and soft tissue were excised. Proximal phalanx head was exposed and resected. Hemiphalangectomy was performed. Neurovascular structures were protected. Wound was thoroughly irrigated. Deep closure was with 4-0 Vicryl. Skin approximated with 4-0 Monocryl and skin glue. Attention was directed to the dorsum of the foot where tarsal exostosis was present. I carefully marked the neurovascular bundle preoperatively. Curvilinear incision was accomplished lateral to the bundle with care being taken to preserve neurovascular structures, and blunt dissection through the surrounding tissues to the dorsal portion of the bone was accomplished, and then blunt dissection exposing tarsal exostosis which was extensive across the dorsum of the foot.   It was then excised with bone cutting instruments and mallet, rongeured and rasped to a smooth surface, thoroughly irrigated with saline. Deep closure was with 4-0 Vicryl. Skin approximated with Monocryl and skin glue. Betadine compressive dressing was applied. The patient tolerated the procedure and anesthesia well with vital signs stable throughout. The patient was transported to the recovery room in stable condition.       Amanda Pearce DPM      [id number]  D:  08/09/2021 9:27  T:  08/10/2021 0:13  JOB #:  [id number]  CC:  Jaspreet Villavicencio DPM

## 2022-03-03 ENCOUNTER — TRANSCRIBE ORDER (OUTPATIENT)
Dept: SCHEDULING | Age: 62
End: 2022-03-03

## 2022-03-03 DIAGNOSIS — Z12.31 ENCOUNTER FOR SCREENING MAMMOGRAM FOR MALIGNANT NEOPLASM OF BREAST: Primary | ICD-10-CM

## 2022-06-07 ENCOUNTER — TRANSCRIBE ORDER (OUTPATIENT)
Dept: SCHEDULING | Age: 62
End: 2022-06-07

## 2022-06-07 DIAGNOSIS — Z78.0 POSTMENOPAUSAL: Primary | ICD-10-CM

## 2022-06-29 ENCOUNTER — HOSPITAL ENCOUNTER (OUTPATIENT)
Dept: BONE DENSITY | Age: 62
Discharge: HOME | End: 2022-06-29
Attending: FAMILY MEDICINE
Payer: MEDICAID

## 2022-06-29 ENCOUNTER — HOSPITAL ENCOUNTER (OUTPATIENT)
Dept: MAMMOGRAPHY | Age: 62
Discharge: HOME | End: 2022-06-29
Attending: FAMILY MEDICINE
Payer: MEDICAID

## 2022-06-29 DIAGNOSIS — Z78.0 POSTMENOPAUSAL: ICD-10-CM

## 2022-06-29 DIAGNOSIS — Z12.31 ENCOUNTER FOR SCREENING MAMMOGRAM FOR MALIGNANT NEOPLASM OF BREAST: ICD-10-CM

## 2022-06-29 PROCEDURE — 77080 DXA BONE DENSITY AXIAL: CPT

## 2022-06-29 PROCEDURE — 77067 SCR MAMMO BI INCL CAD: CPT

## 2022-07-05 ENCOUNTER — TRANSCRIBE ORDER (OUTPATIENT)
Dept: SCHEDULING | Age: 62
End: 2022-07-05

## 2022-07-05 DIAGNOSIS — N64.89 BREAST ASYMMETRY: Primary | ICD-10-CM

## 2022-07-21 ENCOUNTER — HOSPITAL ENCOUNTER (OUTPATIENT)
Dept: ULTRASOUND IMAGING | Age: 62
Discharge: HOME | End: 2022-07-21
Attending: FAMILY MEDICINE
Payer: MEDICAID

## 2022-07-21 ENCOUNTER — HOSPITAL ENCOUNTER (OUTPATIENT)
Dept: MAMMOGRAPHY | Age: 62
Discharge: HOME | End: 2022-07-21
Attending: FAMILY MEDICINE
Payer: MEDICAID

## 2022-07-21 DIAGNOSIS — N64.89 BREAST ASYMMETRY: ICD-10-CM

## 2022-07-21 PROCEDURE — 77065 DX MAMMO INCL CAD UNI: CPT

## 2022-07-21 PROCEDURE — 77061 BREAST TOMOSYNTHESIS UNI: CPT

## 2022-10-14 ENCOUNTER — HOSPITAL ENCOUNTER (OUTPATIENT)
Dept: GENERAL RADIOLOGY | Age: 62
Discharge: HOME | End: 2022-10-14
Payer: MEDICAID

## 2022-10-14 ENCOUNTER — HOSPITAL ENCOUNTER (OUTPATIENT)
Dept: PREADMISSION TESTING | Age: 62
Discharge: HOME | End: 2022-10-14
Payer: MEDICAID

## 2022-10-14 ENCOUNTER — TRANSCRIBE ORDER (OUTPATIENT)
Dept: REGISTRATION | Age: 62
End: 2022-10-14

## 2022-10-14 DIAGNOSIS — M67.471 GANGLION, RIGHT ANKLE AND FOOT: Primary | ICD-10-CM

## 2022-10-14 DIAGNOSIS — M67.471 GANGLION, RIGHT ANKLE AND FOOT: ICD-10-CM

## 2022-10-14 LAB
ANION GAP SERPL CALC-SCNC: 4 MMOL/L (ref 3–18)
APTT PPP: 26 SEC (ref 23–36.4)
ATRIAL RATE: 80 BPM
BASOPHILS # BLD: 0.1 K/UL (ref 0–0.1)
BASOPHILS NFR BLD: 1 % (ref 0–2)
BUN SERPL-MCNC: 10 MG/DL (ref 7–18)
BUN/CREAT SERPL: 14 (ref 12–20)
CALCIUM SERPL-MCNC: 10 MG/DL (ref 8.5–10.1)
CALCULATED P AXIS, ECG09: 57 DEGREES
CALCULATED R AXIS, ECG10: 49 DEGREES
CALCULATED T AXIS, ECG11: 3 DEGREES
CHLORIDE SERPL-SCNC: 103 MMOL/L (ref 100–111)
CO2 SERPL-SCNC: 31 MMOL/L (ref 21–32)
CREAT SERPL-MCNC: 0.71 MG/DL (ref 0.6–1.3)
DIAGNOSIS, 93000: NORMAL
DIFFERENTIAL METHOD BLD: NORMAL
EOSINOPHIL # BLD: 0.3 K/UL (ref 0–0.4)
EOSINOPHIL NFR BLD: 3 % (ref 0–5)
ERYTHROCYTE [DISTWIDTH] IN BLOOD BY AUTOMATED COUNT: 11.8 % (ref 11.6–14.5)
GLUCOSE SERPL-MCNC: 92 MG/DL (ref 74–99)
HCT VFR BLD AUTO: 39.1 % (ref 35–45)
HGB BLD-MCNC: 13.3 G/DL (ref 12–16)
IMM GRANULOCYTES # BLD AUTO: 0 K/UL (ref 0–0.04)
IMM GRANULOCYTES NFR BLD AUTO: 0 % (ref 0–0.5)
INR PPP: 0.9 (ref 0.8–1.2)
LYMPHOCYTES # BLD: 3.1 K/UL (ref 0.9–3.6)
LYMPHOCYTES NFR BLD: 37 % (ref 21–52)
MCH RBC QN AUTO: 30.6 PG (ref 24–34)
MCHC RBC AUTO-ENTMCNC: 34 G/DL (ref 31–37)
MCV RBC AUTO: 90.1 FL (ref 78–100)
MONOCYTES # BLD: 0.5 K/UL (ref 0.05–1.2)
MONOCYTES NFR BLD: 6 % (ref 3–10)
NEUTS SEG # BLD: 4.5 K/UL (ref 1.8–8)
NEUTS SEG NFR BLD: 53 % (ref 40–73)
NRBC # BLD: 0 K/UL (ref 0–0.01)
NRBC BLD-RTO: 0 PER 100 WBC
P-R INTERVAL, ECG05: 142 MS
PLATELET # BLD AUTO: 189 K/UL (ref 135–420)
PMV BLD AUTO: 11.7 FL (ref 9.2–11.8)
POTASSIUM SERPL-SCNC: 3.7 MMOL/L (ref 3.5–5.5)
PROTHROMBIN TIME: 12.3 SEC (ref 11.5–15.2)
Q-T INTERVAL, ECG07: 382 MS
QRS DURATION, ECG06: 76 MS
QTC CALCULATION (BEZET), ECG08: 440 MS
RBC # BLD AUTO: 4.34 M/UL (ref 4.2–5.3)
SODIUM SERPL-SCNC: 138 MMOL/L (ref 136–145)
VENTRICULAR RATE, ECG03: 80 BPM
WBC # BLD AUTO: 8.4 K/UL (ref 4.6–13.2)

## 2022-10-14 PROCEDURE — 71046 X-RAY EXAM CHEST 2 VIEWS: CPT

## 2022-10-14 PROCEDURE — 80048 BASIC METABOLIC PNL TOTAL CA: CPT

## 2022-10-14 PROCEDURE — 85730 THROMBOPLASTIN TIME PARTIAL: CPT

## 2022-10-14 PROCEDURE — 85610 PROTHROMBIN TIME: CPT

## 2022-10-14 PROCEDURE — 93005 ELECTROCARDIOGRAM TRACING: CPT

## 2022-10-14 PROCEDURE — 85025 COMPLETE CBC W/AUTO DIFF WBC: CPT

## 2022-10-14 PROCEDURE — 36415 COLL VENOUS BLD VENIPUNCTURE: CPT

## 2022-11-29 ENCOUNTER — TRANSCRIBE ORDER (OUTPATIENT)
Dept: REGISTRATION | Age: 62
End: 2022-11-29

## 2022-11-29 ENCOUNTER — HOSPITAL ENCOUNTER (OUTPATIENT)
Dept: PREADMISSION TESTING | Age: 62
Discharge: HOME | End: 2022-11-29
Payer: MEDICAID

## 2022-11-29 DIAGNOSIS — M67.471 GANGLION, RIGHT ANKLE AND FOOT: Primary | ICD-10-CM

## 2022-11-29 DIAGNOSIS — M67.471 GANGLION, RIGHT ANKLE AND FOOT: ICD-10-CM

## 2022-11-29 LAB
ANION GAP SERPL CALC-SCNC: 5 MMOL/L (ref 3–18)
APTT PPP: 26.6 SEC (ref 23–36.4)
BASOPHILS # BLD: 0.1 K/UL (ref 0–0.1)
BASOPHILS NFR BLD: 1 % (ref 0–2)
BUN SERPL-MCNC: 10 MG/DL (ref 7–18)
BUN/CREAT SERPL: 12 (ref 12–20)
CALCIUM SERPL-MCNC: 9.3 MG/DL (ref 8.5–10.1)
CHLORIDE SERPL-SCNC: 105 MMOL/L (ref 100–111)
CO2 SERPL-SCNC: 29 MMOL/L (ref 21–32)
CREAT SERPL-MCNC: 0.83 MG/DL (ref 0.6–1.3)
DIFFERENTIAL METHOD BLD: ABNORMAL
EOSINOPHIL # BLD: 0.3 K/UL (ref 0–0.4)
EOSINOPHIL NFR BLD: 4 % (ref 0–5)
ERYTHROCYTE [DISTWIDTH] IN BLOOD BY AUTOMATED COUNT: 12.1 % (ref 11.6–14.5)
GLUCOSE SERPL-MCNC: 211 MG/DL (ref 74–99)
HCT VFR BLD AUTO: 37 % (ref 35–45)
HGB BLD-MCNC: 12.7 G/DL (ref 12–16)
IMM GRANULOCYTES # BLD AUTO: 0 K/UL (ref 0–0.04)
IMM GRANULOCYTES NFR BLD AUTO: 0 % (ref 0–0.5)
INR PPP: 0.9 (ref 0.8–1.2)
LYMPHOCYTES # BLD: 2.4 K/UL (ref 0.9–3.6)
LYMPHOCYTES NFR BLD: 31 % (ref 21–52)
MCH RBC QN AUTO: 30.5 PG (ref 24–34)
MCHC RBC AUTO-ENTMCNC: 34.3 G/DL (ref 31–37)
MCV RBC AUTO: 88.9 FL (ref 78–100)
MONOCYTES # BLD: 0.4 K/UL (ref 0.05–1.2)
MONOCYTES NFR BLD: 6 % (ref 3–10)
NEUTS SEG # BLD: 4.5 K/UL (ref 1.8–8)
NEUTS SEG NFR BLD: 58 % (ref 40–73)
NRBC # BLD: 0 K/UL (ref 0–0.01)
NRBC BLD-RTO: 0 PER 100 WBC
PLATELET # BLD AUTO: 198 K/UL (ref 135–420)
PMV BLD AUTO: 11.4 FL (ref 9.2–11.8)
POTASSIUM SERPL-SCNC: 4 MMOL/L (ref 3.5–5.5)
PROTHROMBIN TIME: 12.8 SEC (ref 11.5–15.2)
RBC # BLD AUTO: 4.16 M/UL (ref 4.2–5.3)
SODIUM SERPL-SCNC: 139 MMOL/L (ref 136–145)
WBC # BLD AUTO: 7.6 K/UL (ref 4.6–13.2)

## 2022-11-29 PROCEDURE — 85730 THROMBOPLASTIN TIME PARTIAL: CPT

## 2022-11-29 PROCEDURE — 85610 PROTHROMBIN TIME: CPT

## 2022-11-29 PROCEDURE — 85025 COMPLETE CBC W/AUTO DIFF WBC: CPT

## 2022-11-29 PROCEDURE — 36415 COLL VENOUS BLD VENIPUNCTURE: CPT

## 2022-11-29 PROCEDURE — 80048 BASIC METABOLIC PNL TOTAL CA: CPT

## 2022-12-06 RX ORDER — CHOLECALCIFEROL TAB 125 MCG (5000 UNIT) 125 MCG
5000 TAB ORAL DAILY
COMMUNITY

## 2022-12-06 RX ORDER — SEMAGLUTIDE 1.34 MG/ML
1 INJECTION, SOLUTION SUBCUTANEOUS
COMMUNITY

## 2022-12-06 RX ORDER — MULTIVITAMIN
1 TABLET ORAL 2 TIMES DAILY
COMMUNITY

## 2022-12-06 RX ORDER — FENOFIBRATE 48 MG/1
48 TABLET, COATED ORAL DAILY
COMMUNITY

## 2022-12-06 RX ORDER — INSULIN GLARGINE 100 [IU]/ML
36 INJECTION, SOLUTION SUBCUTANEOUS DAILY
COMMUNITY

## 2022-12-06 RX ORDER — INSULIN LISPRO 100 [IU]/ML
10-15 INJECTION, SOLUTION INTRAVENOUS; SUBCUTANEOUS
COMMUNITY

## 2022-12-06 NOTE — PERIOP NOTES
PRE-SURGICAL INSTRUCTIONS        Patient's Name:  Brii REDD Date:  12/6/2022            Covid Testing Date and Time:    Surgery Date:  12/12/2022                Do NOT eat or drink anything, including candy, gum, or ice chips after midnight on 12/11, unless you have specific instructions from your surgeon or anesthesia provider to do so. You may brush your teeth before coming to the hospital.  No smoking 24 hours prior to the day of surgery. No alcohol 24 hours prior to the day of surgery. No recreational drugs for one week prior to the day of surgery. Leave all valuables, including money/purse, at home. Remove all jewelry, nail polish, acrylic nails, and makeup (including mascara); no lotions powders, deodorant, or perfume/cologne/after shave on the skin. Follow instruction for Hibiclens washes and CHG wipes from surgeon's office. Glasses/contact lenses and dentures may be worn to the hospital.  They will be removed prior to surgery. Call your doctor if symptoms of a cold or illness develop within 24-48 hours prior to your surgery. 11.  If you are having an outpatient procedure, please make arrangements for a responsible ADULT TO [address] [place] [address] and stay with you for 24 hours after your surgery. 12. ONE VISITOR in the hospital at this time for outpatient procedures. Exceptions may be made for surgical admissions, per nursing unit guidelines      Special Instructions:      Bring list of CURRENT medications. Bring inhaler. Bring CPAP machine. Bring any pertinent legal medical records. Take these medications the morning of surgery with a sip of water:  Keppra , Levothyroxine ,Zyrtec  and use Breo. Follow physician instructions about insulin. Follow physician instructions about stopping anticoagulants. Aspirin last dose 12/06  Complete bowel prep per MD instructions. On the day of surgery, come in the main entrance of [facility].   Let the security guard at the desk know you are there for surgery. A staff member will come escort you to the surgical area on the second floor. If you have any questions or concerns, please do not hesitate to call:     (Prior to the day of surgery) [facility] department:  [phone]   (Day of surgery) Pre-Op department:  [phone]    These surgical instructions were reviewed with Joyce Bonner during the [facility] phone call.

## 2022-12-09 ENCOUNTER — ANESTHESIA EVENT (OUTPATIENT)
Dept: SURGERY | Age: 62
End: 2022-12-09
Payer: MEDICAID

## 2022-12-12 ENCOUNTER — ANESTHESIA (OUTPATIENT)
Dept: SURGERY | Age: 62
End: 2022-12-12
Payer: MEDICAID

## 2022-12-12 ENCOUNTER — HOSPITAL ENCOUNTER (OUTPATIENT)
Age: 62
Setting detail: OUTPATIENT SURGERY
Discharge: HOME | End: 2022-12-12
Attending: PODIATRIST | Admitting: PODIATRIST
Payer: MEDICAID

## 2022-12-12 VITALS
RESPIRATION RATE: 13 BRPM | SYSTOLIC BLOOD PRESSURE: 120 MMHG | HEART RATE: 77 BPM | DIASTOLIC BLOOD PRESSURE: 59 MMHG | HEIGHT: 64 IN | OXYGEN SATURATION: 98 % | TEMPERATURE: 97.4 F | WEIGHT: 168 LBS | BODY MASS INDEX: 28.68 KG/M2

## 2022-12-12 LAB
GLUCOSE BLD STRIP.AUTO-MCNC: 81 MG/DL (ref 70–110)
GLUCOSE BLD STRIP.AUTO-MCNC: 90 MG/DL (ref 70–110)
HBA1C MFR BLD: 10 % (ref 4.2–5.6)

## 2022-12-12 PROCEDURE — 74011000250 HC RX REV CODE- 250: Performed by: NURSE ANESTHETIST, CERTIFIED REGISTERED

## 2022-12-12 PROCEDURE — 74011250636 HC RX REV CODE- 250/636: Performed by: NURSE ANESTHETIST, CERTIFIED REGISTERED

## 2022-12-12 PROCEDURE — 74011000250 HC RX REV CODE- 250: Performed by: PODIATRIST

## 2022-12-12 PROCEDURE — 74011250637 HC RX REV CODE- 250/637: Performed by: NURSE ANESTHETIST, CERTIFIED REGISTERED

## 2022-12-12 PROCEDURE — 77030040361 HC SLV COMPR DVT MDII -B: Performed by: PODIATRIST

## 2022-12-12 PROCEDURE — 77030011265 HC ELECTRD BLD HEX COVD -A: Performed by: PODIATRIST

## 2022-12-12 PROCEDURE — 77030013179 HC SHOE PSTOP OPN DJOR -A: Performed by: PODIATRIST

## 2022-12-12 PROCEDURE — 01470 ANES PX NRV MSC LW L/A/F NOS: CPT | Performed by: NURSE ANESTHETIST, CERTIFIED REGISTERED

## 2022-12-12 PROCEDURE — 77030020753 HC CUF TRNQT 1BLA STRY -B: Performed by: PODIATRIST

## 2022-12-12 PROCEDURE — 77030031139 HC SUT VCRL2 J&J -A: Performed by: PODIATRIST

## 2022-12-12 PROCEDURE — 2709999900 HC NON-CHARGEABLE SUPPLY: Performed by: PODIATRIST

## 2022-12-12 PROCEDURE — 76210000021 HC REC RM PH II 0.5 TO 1 HR: Performed by: PODIATRIST

## 2022-12-12 PROCEDURE — 76060000032 HC ANESTHESIA 0.5 TO 1 HR: Performed by: PODIATRIST

## 2022-12-12 PROCEDURE — 01470 ANES PX NRV MSC LW L/A/F NOS: CPT | Performed by: ANESTHESIOLOGY

## 2022-12-12 PROCEDURE — 77030036687 HC SHOE PSTOP S2SG -A: Performed by: PODIATRIST

## 2022-12-12 PROCEDURE — 82962 GLUCOSE BLOOD TEST: CPT

## 2022-12-12 PROCEDURE — 88304 TISSUE EXAM BY PATHOLOGIST: CPT

## 2022-12-12 PROCEDURE — 83036 HEMOGLOBIN GLYCOSYLATED A1C: CPT

## 2022-12-12 PROCEDURE — 76010000138 HC OR TIME 0.5 TO 1 HR: Performed by: PODIATRIST

## 2022-12-12 PROCEDURE — 77030040922 HC BLNKT HYPOTHRM STRY -A: Performed by: PODIATRIST

## 2022-12-12 PROCEDURE — 76210000006 HC OR PH I REC 0.5 TO 1 HR: Performed by: PODIATRIST

## 2022-12-12 RX ORDER — FENTANYL CITRATE 50 UG/ML
50 INJECTION, SOLUTION INTRAMUSCULAR; INTRAVENOUS
Status: CANCELLED | OUTPATIENT
Start: 2022-12-12

## 2022-12-12 RX ORDER — LIDOCAINE HYDROCHLORIDE 10 MG/ML
0.1 INJECTION, SOLUTION EPIDURAL; INFILTRATION; INTRACAUDAL; PERINEURAL AS NEEDED
Status: DISCONTINUED | OUTPATIENT
Start: 2022-12-12 | End: 2022-12-12 | Stop reason: HOSPADM

## 2022-12-12 RX ORDER — DEXMEDETOMIDINE HYDROCHLORIDE 4 UG/ML
INJECTION, SOLUTION INTRAVENOUS AS NEEDED
Status: DISCONTINUED | OUTPATIENT
Start: 2022-12-12 | End: 2022-12-12 | Stop reason: HOSPADM

## 2022-12-12 RX ORDER — PROPOFOL 10 MG/ML
INJECTION, EMULSION INTRAVENOUS AS NEEDED
Status: DISCONTINUED | OUTPATIENT
Start: 2022-12-12 | End: 2022-12-12 | Stop reason: HOSPADM

## 2022-12-12 RX ORDER — INSULIN LISPRO 100 [IU]/ML
INJECTION, SOLUTION INTRAVENOUS; SUBCUTANEOUS ONCE
Status: CANCELLED | OUTPATIENT
Start: 2022-12-12 | End: 2022-12-13

## 2022-12-12 RX ORDER — HUMIDIFIER
EACH MISCELLANEOUS
Qty: 2 EACH | Refills: 0 | Status: SHIPPED | OUTPATIENT
Start: 2022-12-12

## 2022-12-12 RX ORDER — LIDOCAINE HYDROCHLORIDE 20 MG/ML
INJECTION, SOLUTION EPIDURAL; INFILTRATION; INTRACAUDAL; PERINEURAL AS NEEDED
Status: DISCONTINUED | OUTPATIENT
Start: 2022-12-12 | End: 2022-12-12 | Stop reason: HOSPADM

## 2022-12-12 RX ORDER — FAMOTIDINE 20 MG/1
20 TABLET, FILM COATED ORAL ONCE
Status: COMPLETED | OUTPATIENT
Start: 2022-12-12 | End: 2022-12-12

## 2022-12-12 RX ORDER — MAGNESIUM SULFATE 100 %
4 CRYSTALS MISCELLANEOUS AS NEEDED
Status: CANCELLED | OUTPATIENT
Start: 2022-12-12

## 2022-12-12 RX ORDER — INSULIN LISPRO 100 [IU]/ML
INJECTION, SOLUTION INTRAVENOUS; SUBCUTANEOUS ONCE
Status: DISCONTINUED | OUTPATIENT
Start: 2022-12-12 | End: 2022-12-12 | Stop reason: HOSPADM

## 2022-12-12 RX ORDER — ONDANSETRON 2 MG/ML
INJECTION INTRAMUSCULAR; INTRAVENOUS AS NEEDED
Status: DISCONTINUED | OUTPATIENT
Start: 2022-12-12 | End: 2022-12-12 | Stop reason: HOSPADM

## 2022-12-12 RX ORDER — SODIUM CHLORIDE, SODIUM LACTATE, POTASSIUM CHLORIDE, CALCIUM CHLORIDE 600; 310; 30; 20 MG/100ML; MG/100ML; MG/100ML; MG/100ML
50 INJECTION, SOLUTION INTRAVENOUS CONTINUOUS
Status: DISCONTINUED | OUTPATIENT
Start: 2022-12-12 | End: 2022-12-12 | Stop reason: HOSPADM

## 2022-12-12 RX ORDER — FENTANYL CITRATE 50 UG/ML
INJECTION, SOLUTION INTRAMUSCULAR; INTRAVENOUS AS NEEDED
Status: DISCONTINUED | OUTPATIENT
Start: 2022-12-12 | End: 2022-12-12 | Stop reason: HOSPADM

## 2022-12-12 RX ORDER — NEOMYCIN AND POLYMYXIN B SULFATES 40; 200000 MG/ML; [USP'U]/ML
SOLUTION IRRIGATION AS NEEDED
Status: DISCONTINUED | OUTPATIENT
Start: 2022-12-12 | End: 2022-12-12 | Stop reason: HOSPADM

## 2022-12-12 RX ORDER — ONDANSETRON 2 MG/ML
4 INJECTION INTRAMUSCULAR; INTRAVENOUS ONCE
Status: CANCELLED | OUTPATIENT
Start: 2022-12-12 | End: 2022-12-12

## 2022-12-12 RX ORDER — DEXAMETHASONE SODIUM PHOSPHATE 4 MG/ML
INJECTION, SOLUTION INTRA-ARTICULAR; INTRALESIONAL; INTRAMUSCULAR; INTRAVENOUS; SOFT TISSUE AS NEEDED
Status: DISCONTINUED | OUTPATIENT
Start: 2022-12-12 | End: 2022-12-12 | Stop reason: HOSPADM

## 2022-12-12 RX ORDER — SODIUM CHLORIDE 0.9 % (FLUSH) 0.9 %
5-40 SYRINGE (ML) INJECTION AS NEEDED
Status: CANCELLED | OUTPATIENT
Start: 2022-12-12

## 2022-12-12 RX ORDER — SODIUM CHLORIDE 0.9 % (FLUSH) 0.9 %
5-40 SYRINGE (ML) INJECTION EVERY 8 HOURS
Status: CANCELLED | OUTPATIENT
Start: 2022-12-12

## 2022-12-12 RX ORDER — FENTANYL CITRATE 50 UG/ML
25 INJECTION, SOLUTION INTRAMUSCULAR; INTRAVENOUS AS NEEDED
Status: CANCELLED | OUTPATIENT
Start: 2022-12-12

## 2022-12-12 RX ORDER — KETOROLAC TROMETHAMINE 15 MG/ML
INJECTION, SOLUTION INTRAMUSCULAR; INTRAVENOUS AS NEEDED
Status: DISCONTINUED | OUTPATIENT
Start: 2022-12-12 | End: 2022-12-12 | Stop reason: HOSPADM

## 2022-12-12 RX ORDER — PROPOFOL 10 MG/ML
VIAL (ML) INTRAVENOUS
Status: DISCONTINUED | OUTPATIENT
Start: 2022-12-12 | End: 2022-12-12 | Stop reason: HOSPADM

## 2022-12-12 RX ADMIN — ONDANSETRON 4 MG: 2 INJECTION INTRAMUSCULAR; INTRAVENOUS at 12:59

## 2022-12-12 RX ADMIN — PROPOFOL 50 MG: 10 INJECTION, EMULSION INTRAVENOUS at 12:59

## 2022-12-12 RX ADMIN — Medication 10 MCG: at 13:16

## 2022-12-12 RX ADMIN — Medication 10 MCG: at 13:28

## 2022-12-12 RX ADMIN — FAMOTIDINE 20 MG: 20 TABLET ORAL at 10:02

## 2022-12-12 RX ADMIN — PROPOFOL 120 MCG/KG/MIN: 10 INJECTION, EMULSION INTRAVENOUS at 12:59

## 2022-12-12 RX ADMIN — FENTANYL CITRATE 50 MCG: 50 INJECTION, SOLUTION INTRAMUSCULAR; INTRAVENOUS at 12:59

## 2022-12-12 RX ADMIN — KETOROLAC TROMETHAMINE 7.5 MG: 15 INJECTION, SOLUTION INTRAMUSCULAR; INTRAVENOUS at 13:27

## 2022-12-12 RX ADMIN — LIDOCAINE HYDROCHLORIDE 100 MG: 20 INJECTION, SOLUTION EPIDURAL; INFILTRATION; INTRACAUDAL; PERINEURAL at 12:59

## 2022-12-12 RX ADMIN — FENTANYL CITRATE 50 MCG: 50 INJECTION, SOLUTION INTRAMUSCULAR; INTRAVENOUS at 13:27

## 2022-12-12 RX ADMIN — DEXAMETHASONE SODIUM PHOSPHATE 4 MG: 4 INJECTION, SOLUTION INTRAMUSCULAR; INTRAVENOUS at 12:59

## 2022-12-12 RX ADMIN — SODIUM CHLORIDE, SODIUM LACTATE, POTASSIUM CHLORIDE, AND CALCIUM CHLORIDE 50 ML/HR: 600; 310; 30; 20 INJECTION, SOLUTION INTRAVENOUS at 10:03

## 2022-12-12 NOTE — BRIEF OP NOTE
Brief Postoperative Note    Patient: West Bowles  YOB: 1960  MRN: [id number]    Date of Procedure: 12/12/2022     Pre-Op Diagnosis: M67.471 GANGLION CYST    Post-Op Diagnosis: Same as preoperative diagnosis.       Procedure(s):  EXCISION GANGLION CYST RIGHT FOOT    Surgeon(s):  Elijah Valerio    Surgical Assistant: Surg Asst-1: Chiara Wilcox    Anesthesia: MAC     Estimated Blood Loss (mL): Minimal    Complications: None    Specimens:   ID Type Source Tests Collected by Time Destination   1 : Ganglion Cyst Preservative Foot, Right  Elijah Valerio 12/12/2022 1321 Pathology        Implants: * No implants in log *    Drains: * No LDAs found *    Findings: excision ganglion cyst    Electronically Signed by Jarrod Younger DPM on 12/12/2022 at 1:29 PM
No

## 2022-12-12 NOTE — ANESTHESIA POSTPROCEDURE EVALUATION
Procedure(s):  EXCISION GANGLION CYST RIGHT FOOT. MAC    Anesthesia Post Evaluation      Multimodal analgesia: multimodal analgesia used between 6 hours prior to anesthesia start to PACU discharge  Patient location during evaluation: PACU  Patient participation: complete - patient participated  Level of consciousness: awake and alert  Pain management: adequate  Airway patency: patent  Anesthetic complications: no  Cardiovascular status: acceptable  Respiratory status: acceptable  Hydration status: acceptable  Post anesthesia nausea and vomiting:  none  Final Post Anesthesia Temperature Assessment:  Normothermia (36.0-37.5 degrees C)      INITIAL Post-op Vital signs:   Vitals Value Taken Time   /70 12/12/22 1442   Temp 36.4 °C (97.5 °F) 12/12/22 1343   Pulse 72 12/12/22 1444   Resp 13 12/12/22 1444   SpO2 94 % 12/12/22 1444   Vitals shown include unvalidated device data.

## 2022-12-12 NOTE — H&P
Update History & Physical    The Patient's History and Physical of December 12,   surgery was reviewed with the patient and I examined the patient. There was no change. The surgical site was confirmed by the patient and me. Plan:  The risk, benefits, expected outcome, and alternative to the recommended procedure have been discussed with the patient. Patient understands and wants to proceed with the procedure.     Electronically signed by Gladys Whitman DPM on 12/12/2022 at 12:24 PM

## 2022-12-12 NOTE — DISCHARGE INSTRUCTIONS
Ganglion Cyst Removal: What to Expect at Home  Your Recovery     Ganglion cyst removal is surgery to remove a ganglion that has caused pain or numbness or made it hard to do your activities. A ganglion is a small sac, or cyst, filled with a clear fluid that is thick like jelly. The cyst may look like a bump on your hand or wrist. Less often, a ganglion can appear on the feet, ankles, knees, or shoulders. The doctor made a cut (incision) in the skin over the ganglion. He or she removed the ganglion and the connecting tissue that allowed fluid to collect there. Then the incision was closed with stitches. You may have a splint over the area to limit movement until the area heals. Your doctor will tell you when it's okay to move the area. He or she may give you instructions on when you can do your normal activities again. Ganglions sometimes come back. New ganglions also may form in the area. This care sheet gives you a general idea about how long it will take for you to recover. But each person recovers at a different pace. Follow the steps below to get better as quickly as possible. How can you care for yourself at home? Activity    For 1 to 2 weeks after surgery on your hand or wrist, avoid activities that involve repeated arm or hand movements. These may include typing, using a computer mouse, vacuuming, or carrying things in the affected hand. Do not use power tools. And avoid other activities that make your hand vibrate. If the procedure involved your foot or ankle, ask your doctor if you need to do less walking or driving for a while. You may be able to go back to work 1 or 2 days after surgery. It depends on the type of work you do and how you feel. You may shower, but do not get the area wet until your doctor says it's okay. Keep the bandage dry by covering it with plastic. Do not take a bath, swim, use a hot tub, or soak your hand until your doctor says it's okay.    Diet    You can eat your normal diet when you feel well. If your stomach is upset, try bland, low-fat foods like plain rice, broiled chicken, toast, and yogurt. Medicines    Your doctor will tell you if and when you can restart your medicines. He or she will also give you instructions about taking any new medicines. If you take aspirin or some other blood thinner, be sure to talk to your doctor. He or she will tell you if and when to start taking this medicine again. Make sure that you understand exactly what your doctor wants you to do. Be safe with medicines. Read and follow all instructions on the label. If the doctor gave you a prescription medicine for pain, take it as prescribed. If you are not taking a prescription pain medicine, ask your doctor if you can take an over-the-counter medicine. Incision care    Leave the bandage on your hand until the doctor says it is okay to remove it. This is usually 2 or 3 days after surgery. After your doctor says you can take off your bandage, wash the area daily with clean water, and pat it dry. Don't use hydrogen peroxide or alcohol. They can slow healing. You may cover the area with a gauze bandage if it oozes fluid or rubs against clothing. Change the bandage every day. Keep the area clean and dry. If you have a splint, do not take it off unless your doctor tells you to. Follow the splint care instructions your doctor gives you. Be careful not to put the splint on too tight. Exercise    Follow your doctor's directions on when and how to move the area to keep it flexible and help reduce swelling. Your doctor may have you wear a splint or brace for a short time after the surgery. If the ganglion is on your wrist or hand, you may need therapy after you heal. This can help you regain movement, strength, and  in your wrist and hand.  To get the best results, you need to do the exercises correctly and as often and as long as your doctor or your physical or occupational therapist tells you to. Ice and elevation    If you have swelling, put ice or a cold pack on the area for 10 to 20 minutes at a time. Try to do this every 1 to 2 hours for the next 3 days (when you are awake) or until the swelling goes down. Put a thin cloth between the ice and your skin or splint. Prop up the area on a pillow anytime you sit or lie down for the first 2 or 3 days. Try to keep it above the level of your heart. This will help reduce swelling. Follow-up care is a key part of your treatment and safety. Be sure to make and go to all appointments, and call your doctor if you are having problems. It's also a good idea to know your test results and keep a list of the medicines you take. When should you call for help? Call 911  anytime you think you may need emergency care. For example, call if:    You passed out (lost consciousness). You have chest pain, are short of breath, or cough up blood. Call your doctor now or seek immediate medical care if:    You have pain that does not get better after you take pain medicine. You have loose stitches, or your incision comes open. Bright red blood has soaked through the bandage over your incision. You have symptoms of infection, such as: Increased pain, swelling, warmth, or redness. Red streaks leading from the area. Pus draining from the area. A fever. The area is cool or pale or changes color. The area is tingly, weak, or numb. You can't move the area. Your splint feels too tight. Watch closely for any changes in your health, and be sure to contact your doctor if:    You do not get better as expected. Current as of: March 9, 2022               Content Version: 13.4  © 2006-2022 [employer]. Care instructions adapted under license by [employer] (which disclaims liability or warranty for this information).  If you have questions about a medical condition or this instruction, always ask your healthcare professional. [place] [address] any warranty or liability for your use of this information. DISCHARGE SUMMARY from Nurse    PATIENT INSTRUCTIONS:    After general anesthesia or intravenous sedation, for 24 hours or while taking prescription Narcotics:  Limit your activities  Do not drive and operate hazardous machinery  Do not make important personal or business decisions  Do  not drink alcoholic beverages  If you have not urinated within 8 hours after discharge, please contact your surgeon on call. Report the following to your surgeon:  Excessive pain, swelling, redness or odor of or around the surgical area  Temperature over 100.5  Nausea and vomiting lasting longer than 4 hours or if unable to take medications  Any signs of decreased circulation or nerve impairment to extremity: change in color, persistent  numbness, tingling, coldness or increase pain  Any questions        These are general instructions for a healthy lifestyle:    No smoking/ No tobacco products/ Avoid exposure to second hand smoke  Surgeon General's Warning:  Quitting smoking now greatly reduces serious risk to your health. Obesity, smoking, and sedentary lifestyle greatly increases your risk for illness    A healthy diet, regular physical exercise & weight monitoring are important for maintaining a healthy lifestyle    You may be retaining fluid if you have a history of heart failure or if you experience any of the following symptoms:  Weight gain of 3 pounds or more overnight or 5 pounds in a week, increased swelling in our hands or feet or shortness of breath while lying flat in bed. Please call your doctor as soon as you notice any of these symptoms; do not wait until your next office visit. The discharge information has been reviewed with the patient. The patient verbalized understanding.   Discharge medications reviewed with the patient and appropriate educational materials and side effects teaching were provided.   ___________________________________________________________________________________________________________________________________

## 2022-12-13 NOTE — OP NOTES
[facility]  OPERATIVE REPORT    Name:  Tunde Gillespie  MR#:   [id number]  :  1960  ACCOUNT #:  [de-identified]  DATE OF SERVICE:  2022    PREOPERATIVE DIAGNOSIS:  Ganglion cyst, lateral right foot. POSTOPERATIVE DIAGNOSIS:  Ganglion cyst, lateral right foot. PROCEDURE PERFORMED:  Excision of ganglion cyst lateral surface of right foot. SURGEON:  Lennox Ped, DPM    ASSISTANT:  student    ANESTHESIA:  MAC.    COMPLICATIONS:  0.    SPECIMENS REMOVED:  tissue. IMPLANTS:  0.    ESTIMATED BLOOD LOSS:  0.    PROCEDURE:  On 2022, the patient was placed on the operating room table in the supine position. After adequate induction of MAC anesthesia, right lower extremity was prepped and draped in usual sterile fashion. Under sterile ankle tourniquet hemostasis, attention was directed to right lateral foot. There was approximately 2 mm x 1 mm fluctuant hard lesion, a curvilinear incision was accomplished over this lesion with care being taken to preserve neurovascular structures. Small blood vessels were bovied as necessary. Deep blunt dissection was taken to the deep fascia. The cyst was present with indentation in the bone. It was approximately 1 x 2 cm, filled with gelatinous material.  Using blunt dissections, I was able to establish its margins and then sharply excise it rongeuring the underlying bone off any fragments of tissue, was thoroughly irrigated. Deep closure was with Vicryl suture. Skin was approximated with Monocryl and skin glue. A Betadine compressive dressing was applied. The patient tolerated the procedure and anesthesia well with vital signs stable throughout. The patient was transported to the recovery room in stable condition.       Onesimo Michel DPM [id number]  D:  2022 13:44  T:  2022 9:47  JOB #:  [id number]  CC:  Lennox Ped, DPM

## 2023-02-04 DIAGNOSIS — M67.471 GANGLION, RIGHT ANKLE AND FOOT: Primary | ICD-10-CM

## 2024-06-08 ENCOUNTER — APPOINTMENT (OUTPATIENT)
Age: 64
End: 2024-06-08
Payer: MEDICAID

## 2024-06-08 ENCOUNTER — HOSPITAL ENCOUNTER (EMERGENCY)
Age: 64
Discharge: HOME | End: 2024-06-08
Attending: EMERGENCY MEDICINE
Payer: MEDICAID

## 2024-06-08 VITALS
WEIGHT: 151 LBS | HEIGHT: 64 IN | HEART RATE: 100 BPM | SYSTOLIC BLOOD PRESSURE: 124 MMHG | TEMPERATURE: 97.9 F | RESPIRATION RATE: 16 BRPM | OXYGEN SATURATION: 96 % | BODY MASS INDEX: 25.78 KG/M2 | DIASTOLIC BLOOD PRESSURE: 71 MMHG

## 2024-06-08 DIAGNOSIS — S76.302A HAMSTRING INJURY, LEFT, INITIAL ENCOUNTER: ICD-10-CM

## 2024-06-08 DIAGNOSIS — E87.6 HYPOKALEMIA: ICD-10-CM

## 2024-06-08 DIAGNOSIS — S73.102A HIP SPRAIN, LEFT, INITIAL ENCOUNTER: Primary | ICD-10-CM

## 2024-06-08 DIAGNOSIS — E11.65 TYPE 2 DIABETES MELLITUS WITH HYPERGLYCEMIA, UNSPECIFIED WHETHER LONG TERM INSULIN USE (HCC): ICD-10-CM

## 2024-06-08 LAB
ANION GAP SERPL CALC-SCNC: 7 MMOL/L (ref 3–18)
BASOPHILS # BLD: 0.1 K/UL (ref 0–0.1)
BASOPHILS NFR BLD: 1 % (ref 0–2)
BUN SERPL-MCNC: 19 MG/DL (ref 7–18)
BUN/CREAT SERPL: 18 (ref 12–20)
CA-I BLD-MCNC: 9.5 MG/DL (ref 8.5–10.1)
CHLORIDE SERPL-SCNC: 103 MMOL/L (ref 100–111)
CO2 SERPL-SCNC: 30 MMOL/L (ref 21–32)
CREAT SERPL-MCNC: 1.07 MG/DL (ref 0.6–1.3)
DIFFERENTIAL METHOD BLD: ABNORMAL
EOSINOPHIL # BLD: 0.3 K/UL (ref 0–0.4)
EOSINOPHIL NFR BLD: 3 % (ref 0–5)
ERYTHROCYTE [DISTWIDTH] IN BLOOD BY AUTOMATED COUNT: 12.1 % (ref 11.6–14.5)
GLUCOSE SERPL-MCNC: 303 MG/DL (ref 74–99)
HCT VFR BLD AUTO: 39.7 % (ref 35–45)
HGB BLD-MCNC: 13.5 G/DL (ref 12–16)
IMM GRANULOCYTES # BLD AUTO: 0 K/UL (ref 0–0.04)
IMM GRANULOCYTES NFR BLD AUTO: 0 % (ref 0–0.5)
INR PPP: 0.9 (ref 0.9–1.1)
LYMPHOCYTES # BLD: 3.7 K/UL (ref 0.9–3.6)
LYMPHOCYTES NFR BLD: 29 % (ref 21–52)
MCH RBC QN AUTO: 31.3 PG (ref 24–34)
MCHC RBC AUTO-ENTMCNC: 34 G/DL (ref 31–37)
MCV RBC AUTO: 91.9 FL (ref 78–100)
MONOCYTES # BLD: 0.8 K/UL (ref 0.05–1.2)
MONOCYTES NFR BLD: 7 % (ref 3–10)
NEUTS SEG # BLD: 7.8 K/UL (ref 1.8–8)
NEUTS SEG NFR BLD: 60 % (ref 40–73)
NRBC # BLD: 0 K/UL (ref 0–0.01)
NRBC BLD-RTO: 0 PER 100 WBC
PLATELET # BLD AUTO: 210 K/UL (ref 135–420)
PMV BLD AUTO: 12 FL (ref 9.2–11.8)
POTASSIUM SERPL-SCNC: 3.2 MMOL/L (ref 3.5–5.5)
PROTHROMBIN TIME: 12.3 SEC (ref 11.9–14.9)
RBC # BLD AUTO: 4.32 M/UL (ref 4.2–5.3)
SODIUM SERPL-SCNC: 140 MMOL/L (ref 136–145)
WBC # BLD AUTO: 12.8 K/UL (ref 4.6–13.2)

## 2024-06-08 PROCEDURE — 73700 CT LOWER EXTREMITY W/O DYE: CPT

## 2024-06-08 PROCEDURE — 96374 THER/PROPH/DIAG INJ IV PUSH: CPT

## 2024-06-08 PROCEDURE — 6360000002 HC RX W HCPCS: Performed by: EMERGENCY MEDICINE

## 2024-06-08 PROCEDURE — 80048 BASIC METABOLIC PNL TOTAL CA: CPT

## 2024-06-08 PROCEDURE — 85025 COMPLETE CBC W/AUTO DIFF WBC: CPT

## 2024-06-08 PROCEDURE — 6370000000 HC RX 637 (ALT 250 FOR IP): Performed by: EMERGENCY MEDICINE

## 2024-06-08 PROCEDURE — 73552 X-RAY EXAM OF FEMUR 2/>: CPT

## 2024-06-08 PROCEDURE — 99284 EMERGENCY DEPT VISIT MOD MDM: CPT

## 2024-06-08 PROCEDURE — 73590 X-RAY EXAM OF LOWER LEG: CPT

## 2024-06-08 PROCEDURE — 85610 PROTHROMBIN TIME: CPT

## 2024-06-08 PROCEDURE — 73030 X-RAY EXAM OF SHOULDER: CPT

## 2024-06-08 PROCEDURE — 96375 TX/PRO/DX INJ NEW DRUG ADDON: CPT

## 2024-06-08 PROCEDURE — 73502 X-RAY EXAM HIP UNI 2-3 VIEWS: CPT

## 2024-06-08 RX ORDER — KETOROLAC TROMETHAMINE 10 MG/1
10 TABLET, FILM COATED ORAL EVERY 6 HOURS PRN
Qty: 20 TABLET | Refills: 0 | Status: SHIPPED | OUTPATIENT
Start: 2024-06-08

## 2024-06-08 RX ORDER — OXYCODONE HYDROCHLORIDE AND ACETAMINOPHEN 5; 325 MG/1; MG/1
1 TABLET ORAL
Status: COMPLETED | OUTPATIENT
Start: 2024-06-08 | End: 2024-06-08

## 2024-06-08 RX ORDER — MORPHINE SULFATE 4 MG/ML
4 INJECTION, SOLUTION INTRAMUSCULAR; INTRAVENOUS
Status: COMPLETED | OUTPATIENT
Start: 2024-06-08 | End: 2024-06-08

## 2024-06-08 RX ORDER — POTASSIUM CHLORIDE 750 MG/1
40 TABLET, EXTENDED RELEASE ORAL ONCE
Status: COMPLETED | OUTPATIENT
Start: 2024-06-08 | End: 2024-06-08

## 2024-06-08 RX ORDER — TRIAMCINOLONE ACETONIDE 5 MG/G
CREAM TOPICAL
COMMUNITY

## 2024-06-08 RX ORDER — OXYCODONE HYDROCHLORIDE AND ACETAMINOPHEN 5; 325 MG/1; MG/1
1 TABLET ORAL EVERY 6 HOURS PRN
Qty: 10 TABLET | Refills: 0 | Status: SHIPPED | OUTPATIENT
Start: 2024-06-08 | End: 2024-06-11

## 2024-06-08 RX ORDER — HYDROXYZINE HYDROCHLORIDE 25 MG/1
TABLET, FILM COATED ORAL
COMMUNITY

## 2024-06-08 RX ORDER — PSEUDOEPHEDRINE HCL 30 MG
TABLET ORAL
COMMUNITY

## 2024-06-08 RX ORDER — OXYCODONE HYDROCHLORIDE AND ACETAMINOPHEN 5; 325 MG/1; MG/1
1 TABLET ORAL EVERY 6 HOURS PRN
Qty: 10 TABLET | Refills: 0 | Status: SHIPPED | OUTPATIENT
Start: 2024-06-08 | End: 2024-06-08

## 2024-06-08 RX ORDER — ONDANSETRON 2 MG/ML
4 INJECTION INTRAMUSCULAR; INTRAVENOUS
Status: COMPLETED | OUTPATIENT
Start: 2024-06-08 | End: 2024-06-08

## 2024-06-08 RX ORDER — KETOROLAC TROMETHAMINE 10 MG/1
10 TABLET, FILM COATED ORAL EVERY 6 HOURS PRN
Qty: 20 TABLET | Refills: 0 | Status: SHIPPED | OUTPATIENT
Start: 2024-06-08 | End: 2024-06-08

## 2024-06-08 RX ADMIN — OXYCODONE AND ACETAMINOPHEN 1 TABLET: 5; 325 TABLET ORAL at 19:09

## 2024-06-08 RX ADMIN — POTASSIUM CHLORIDE 40 MEQ: 750 TABLET, EXTENDED RELEASE ORAL at 19:09

## 2024-06-08 RX ADMIN — MORPHINE SULFATE 4 MG: 4 INJECTION, SOLUTION INTRAMUSCULAR; INTRAVENOUS at 17:11

## 2024-06-08 RX ADMIN — ONDANSETRON 4 MG: 2 INJECTION INTRAMUSCULAR; INTRAVENOUS at 17:11

## 2024-06-08 ASSESSMENT — PAIN DESCRIPTION - ORIENTATION: ORIENTATION: LEFT

## 2024-06-08 ASSESSMENT — PAIN - FUNCTIONAL ASSESSMENT
PAIN_FUNCTIONAL_ASSESSMENT: 0-10
PAIN_FUNCTIONAL_ASSESSMENT: 0-10

## 2024-06-08 ASSESSMENT — PAIN DESCRIPTION - LOCATION: LOCATION: HIP

## 2024-06-08 ASSESSMENT — LIFESTYLE VARIABLES
HOW MANY STANDARD DRINKS CONTAINING ALCOHOL DO YOU HAVE ON A TYPICAL DAY: 1 OR 2
HOW OFTEN DO YOU HAVE A DRINK CONTAINING ALCOHOL: MONTHLY OR LESS

## 2024-06-08 ASSESSMENT — PAIN SCALES - GENERAL
PAINLEVEL_OUTOF10: 10
PAINLEVEL_OUTOF10: 10

## 2024-06-08 NOTE — ED TRIAGE NOTES
Patient stated she was on a slip-in-slide and fell on her left hip. Patient is complaining of left sided hip pain 10/10. Patient denies loss of consciousness or hitting her head.

## 2024-06-08 NOTE — ED PROVIDER NOTES
6/8/24 1711)   potassium chloride (KLOR-CON M) extended release tablet 40 mEq (40 mEq Oral Given 6/8/24 1909)   oxyCODONE-acetaminophen (PERCOCET) 5-325 MG per tablet 1 tablet (1 tablet Oral Given 6/8/24 1909)       CONSULTS: (Who and What was discussed)  None    Chronic Conditions: As above    Social Determinants affecting Dx or Tx:  None    Records Reviewed (source and summary): Old medical records.  Nursing notes.  Ambulance run sheet.  Previous radiology studies.      CC/HPI Summary, DDx, ED Course, and Reassessment:     Patient brought to ED via EMS after a slide in the slide for and injuring left hip and the patient unable to stand.  No loss of consciousness.  Family brought a video of the fall which I was able to review and seems that she had a rotational type injury to the left hip and lower extremity.  Exam reveals tenderness to palpation of the hip which she has a limited range of motion due to pain.  Suspected possible hip fracture and x-ray and CT obtained which showed no fracture.  She also had tenderness left shoulder and imaging was done which was unremarkable.  On attempts to stand after pain medicines and above evaluate evaluation patient complaining of severe pain posterior left thigh.  The hamstrings are quite tender, slightly swollen but no bruising.  X-ray of the left femur obtained as well as left tib-fib and again no fractures noted.  Patient given more pain medicine, left immobilized and able patient to stand with assistance.  Chemistry shows elevated blood sugar, patient is diabetic, has also been on Medrol Dosepak last dose today.  Potassium is also slightly low.  No signs of DKA.  Patient stable for discharge, will need Ortho follow-up for possible further evaluation and management of the hip and thigh injuries.  She reported she has crutches and a walker at home and are comfortable to go home.    Disposition Considerations (Tests not done, Shared Decision Making, Pt Expectation of Test or  Tx.):      FINAL IMPRESSION     1. Hip sprain, left, initial encounter    2. Hamstring injury, left, initial encounter    3. Hypokalemia    4. Type 2 diabetes mellitus with hyperglycemia, unspecified whether long term insulin use ([facility])         DISPOSITION/PLAN   DISPOSITION Decision To Discharge 06/08/2024 07:04:06 PM      Discharged     PATIENT REFERRED TO:  Carlos Mccabe MD  [address] [address]  [phone]    Schedule an appointment as soon as possible for a visit          DISCHARGE MEDICATIONS:  Discharge Medication List as of 6/8/2024  7:30 PM             Details   ketorolac (TORADOL) 10 MG tablet Take 1 tablet by mouth every 6 hours as needed for Pain, Disp-20 tablet, R-0Normal      oxyCODONE-acetaminophen (PERCOCET) 5-325 MG per tablet Take 1 tablet by mouth every 6 hours as needed for Pain for up to 3 days. Intended supply: 3 days. Take lowest dose possible to manage pain Max Daily Amount: 4 tablets, Disp-10 tablet, R-0Normal                Details   empagliflozin (JARDIANCE) 10 MG tablet Take 2.5 tablets by mouth dailyHistorical Med      docusate (COLACE, DULCOLAX) 100 MG CAPS TAKE 1 CAPSULE BY MOUTH 1 TO 3 TIMES DAILY AS NEEDED FOR CONSTIPATIONHistorical Med      hydrOXYzine HCl (ATARAX) 25 MG tablet TAKE 1 TABLET BY MOUTH EVERY 6 HOURS AS NEEDED FOR ITCHINGHistorical Med      triamcinolone (ARISTOCORT) 0.5 % cream APPLY QUARTER SIZED AMOUNT TO THE AFFECTED AREAS EXTERNALLY 2 TIMES A DAY AS NEEDED FOR NO LONGER THAN 2 WEEKS AT A TIME 14 DAYS, Historical Med      albuterol sulfate HFA (PROVENTIL;VENTOLIN;PROAIR) 108 (90 Base) MCG/ACT inhaler Inhale 1 puff into the lungs every 6 hours as neededHistorical Med      aspirin 81 MG EC tablet Take 81 mg by mouth dailyHistorical Med      calcium carb-cholecalciferol 600-10 MG-MCG TABS per tab Take 1 tablet by mouth 2 times dailyHistorical Med      cetirizine (ZYRTEC) 10 MG tablet Take 10 mg by mouth dailyHistorical Med      vitamin D3

## 2025-03-06 NOTE — PATIENT INSTRUCTIONS
Learning About Benefits From Quitting Smoking How does quitting smoking make you healthier? If you're thinking about quitting smoking, you may have a few reasons to be smoke-free. Your health may be one of them. · When you quit smoking, you lower your risks for cancer, lung disease, heart attack, stroke, blood vessel disease, and blindness from macular degeneration. · When you're smoke-free, you get sick less often, and you heal faster. You are less likely to get colds, flu, bronchitis, and pneumonia. · As a nonsmoker, you may find that your mood is better and you are less stressed. When and how will you feel healthier? Quitting has real health benefits that start from day 1 of being smoke-free. And the longer you stay smoke-free, the healthier you get and the better you feel. The first hours · After just 20 minutes, your blood pressure and heart rate go down. That means there's less stress on your heart and blood vessels. · Within 12 hours, the level of carbon monoxide in your blood drops back to normal. That makes room for more oxygen. With more oxygen in your body, you may notice that you have more energy than when you smoked. After 2 weeks · Your lungs start to work better. · Your risk of heart attack starts to drop. After 1 month · When your lungs are clear, you cough less and breathe deeper, so it's easier to be active. · Your sense of taste and smell return. That means you can enjoy food more than you have since you started smoking. Over the years · After 1 year, your risk of heart disease is half what it would be if you kept smoking. · After 5 years, your risk of stroke starts to shrink. Within a few years after that, it's about the same as if you'd never smoked. · After 10 years, your risk of dying from lung cancer is cut by about half. And your risk for many other types of cancer is lower too. How would quitting help others in your life? When you quit smoking, you improve the health of everyone who now breathes in your smoke. · Their heart, lung, and cancer risks drop, much like yours. · They are sick less. For babies and small children, living smoke-free means they're less likely to have ear infections, pneumonia, and bronchitis. · If you're a woman who is or will be pregnant someday, quitting smoking means a healthier . · Children who are close to you are less likely to become adult smokers. Where can you learn more? Go to http://dina-monika.info/. Enter 052 [id number] [id number] in the search box to learn more about \"Learning About Benefits From Quitting Smoking. \" Current as of: 2018 Content Version: 11.9 © 0308-6641 [employer], [employer]. Care instructions adapted under license by [employer] (which disclaims liability or warranty for this information). If you have questions about a medical condition or this instruction, always ask your healthcare professional. [place] [address] any warranty or liability for your use of this information. Preventing Falls: Care Instructions Your Care Instructions Getting around your home safely can be a challenge if you have injuries or health problems that make it easy for you to fall. Loose rugs and furniture in walkways are among the dangers for many older people who have problems walking or who have poor eyesight. People who have conditions such as arthritis, osteoporosis, or dementia also have to be careful not to fall. You can make your home safer with a few simple measures. Follow-up care is a key part of your treatment and safety. Be sure to make and go to all appointments, and call your doctor if you are having problems. It's also a good idea to know your test results and keep a list of the medicines you take. How can you care for yourself at home? Taking care of yourself · You may get dizzy if you do not drink enough water. To prevent dehydration, drink plenty of fluids, enough so that your urine is light yellow or clear like water. Choose water and other caffeine-free clear liquids. If you have kidney, heart, or liver disease and have to limit fluids, talk with your doctor before you increase the amount of fluids you drink. · Exercise regularly to improve your strength, muscle tone, and balance. Walk if you can. Swimming may be a good choice if you cannot walk easily. · Have your vision and hearing checked each year or any time you notice a change. If you have trouble seeing and hearing, you might not be able to avoid objects and could lose your balance. · Know the side effects of the medicines you take. Ask your doctor or pharmacist whether the medicines you take can affect your balance. Sleeping pills or sedatives can affect your balance. · Limit the amount of alcohol you drink. Alcohol can impair your balance and other senses. · Ask your doctor whether calluses or corns on your feet need to be removed. If you wear loose-fitting shoes because of calluses or corns, you can lose your balance and fall. · Talk to your doctor if you have numbness in your feet. Preventing falls at home · Remove raised doorway thresholds, throw rugs, and clutter. Repair loose carpet or raised areas in the floor. · Move furniture and electrical cords to keep them out of walking paths. · Use nonskid floor wax, and wipe up spills right away, especially on ceramic tile floors. · If you use a walker or cane, put rubber tips on it. If you use crutches, clean the bottoms of them regularly with an abrasive pad, such as steel wool. · Keep your house well lit, especially [facility], and outside walkways. Use night-lights in areas such as hallways and bathrooms.  Add extra light switches or use remote switches (such as switches that go on or off when you clap your hands) to make it easier to turn lights on if you have to get up during the night. · Install sturdy handrails on stairways. · Move items in your cabinets so that the things you use a lot are on the lower shelves (about waist level). · Keep a cordless phone and a flashlight with new batteries by your bed. If possible, put a phone in each of the main rooms of your house, or carry a cell phone in case you fall and cannot reach a phone. Or, you can wear a device around your neck or wrist. You push a button that sends a signal for help. · Wear low-heeled shoes that fit well and give your feet good support. Use footwear with nonskid soles. Check the heels and soles of your shoes for wear. Repair or replace worn heels or soles. · Do not wear socks without shoes on wood floors. · Walk on the grass when the sidewalks are slippery. If you live in an area that gets snow and ice in the winter, sprinkle salt on slippery steps and sidewalks. Preventing falls in the bath · Install grab bars and nonskid mats inside and outside your shower or tub and near the toilet and sinks. · Use shower chairs and bath benches. · Use a hand-held shower head that will allow you to sit while showering. · Get into a tub or shower by putting the weaker leg in first. Get out of a tub or shower with your strong side first. 
· Repair loose toilet seats and consider installing a raised toilet seat to make getting on and off the toilet easier. · Keep your bathroom door unlocked while you are in the shower. Where can you learn more? Go to http://dina-monika.info/. Enter [id number] [id number] in the search box to learn more about \"Preventing Falls: Care Instructions. \" Current as of: March 15, 2018 Content Version: 11.9 © 5681-0053 [employer].  Care instructions adapted under license by [employer] (which disclaims liability or warranty for this information). If you have questions about a medical condition or this instruction, always ask your healthcare professional. [place] [address] any warranty or liability for your use of this information. None

## (undated) DEVICE — [TOMCAT CUTTER, ARTHROSCOPIC SHAVER BLADE,  DO NOT RESTERILIZE,  DO NOT USE IF PACKAGE IS DAMAGED,  KEEP DRY,  KEEP AWAY FROM SUNLIGHT]: Brand: FORMULA

## (undated) DEVICE — CURITY NON-ADHERENT STRIPS: Brand: CURITY

## (undated) DEVICE — Device: Brand: JELCO

## (undated) DEVICE — POSITIONER ARTHSCP KNEE HLDR WHT W/O CVR

## (undated) DEVICE — UNDERCAST PADDING: Brand: DEROYAL

## (undated) DEVICE — MEDI-VAC SUCTION HIGH CAPACITY: Brand: CARDINAL HEALTH

## (undated) DEVICE — BLANKET WRM AD W50XL85.8IN PACU FULL BODY FORC AIR

## (undated) DEVICE — WRAP COMPR W4INXL5YD NONSTERILE TAN SELF ADH COBAN

## (undated) DEVICE — TABLE COVER: Brand: CONVERTORS

## (undated) DEVICE — CUFF TRNQT AD W4IN 18IN CIRC SURG GEL SGL PRT BLDR PNEUMAT

## (undated) DEVICE — NEEDLE HYPO 25GA L1.5IN BVL ORIENTED ECLIPSE

## (undated) DEVICE — SHOE POSTOP M MAN 9-11 UNIV FOAM TRICOT SEMI FLX SKID

## (undated) DEVICE — INTENDED FOR TISSUE SEPARATION, AND OTHER PROCEDURES THAT REQUIRE A SHARP SURGICAL BLADE TO PUNCTURE OR CUT.: Brand: BARD-PARKER SAFETY BLADES SIZE 15, STERILE

## (undated) DEVICE — SYR 50ML SLIP TIP NSAF LF STRL --

## (undated) DEVICE — BANDAGE COBAN 4 IN COMPR W4INXL5YD FOAM COHESIVE QUIK STK SELF ADH SFT

## (undated) DEVICE — GLOVE ORTHO 8   MSG9480

## (undated) DEVICE — SYRINGE MED 25GA 3ML L5/8IN SUBQ PLAS W/ DETACH NDL SFTY

## (undated) DEVICE — APPLICATOR BNDG 1MM ADH PREMIERPRO EXOFIN

## (undated) DEVICE — HEX-LOCKING BLADE ELECTRODE: Brand: EDGE

## (undated) DEVICE — BLADE ASSEMB CLP HAIR FINE --

## (undated) DEVICE — DRAPE,EXTREMITY,89X128,STERILE: Brand: MEDLINE

## (undated) DEVICE — THREE-QUARTER SHEET: Brand: CONVERTORS

## (undated) DEVICE — STRIP SKIN CLSR 1/4INX1.5IN REINF WND NYL CURAD

## (undated) DEVICE — SUTURE VCRL SZ 4-0 L18IN ABSRB UD L19MM PC-5 3/8 CIR J823G

## (undated) DEVICE — MEDI-VAC NON-CONDUCTIVE SUCTION TUBING: Brand: CARDINAL HEALTH

## (undated) DEVICE — SOLUTION IV 1000ML 0.9% SOD CHL

## (undated) DEVICE — STOCKINETTE,IMPERVIOUS,12X48,STERILE: Brand: MEDLINE

## (undated) DEVICE — CANNULA ORIG TL CLR W FOAM CUSHIONS AND 14FT SUPL TB 3 CHN

## (undated) DEVICE — SYR 10ML LUER LOK 1/5ML GRAD --

## (undated) DEVICE — PREP SKN CHLRAPRP APL 26ML STR --

## (undated) DEVICE — INTENDED FOR TISSUE SEPARATION, AND OTHER PROCEDURES THAT REQUIRE A SHARP SURGICAL BLADE TO PUNCTURE OR CUT.: Brand: BARD-PARKER SAFETY BLADES SIZE 11, STERILE

## (undated) DEVICE — PACK PROCEDURE SURG MAJ W/ BASIN LF

## (undated) DEVICE — SOLUTION IRRIG 1000ML H2O STRL BLT

## (undated) DEVICE — SUTURE COAT VCRL PC 5 PRECIS COSM CONVENTIONAL CUT PRIM 3 8 J823H

## (undated) DEVICE — INTENDED FOR TISSUE SEPARATION, AND OTHER PROCEDURES THAT REQUIRE A SHARP SURGICAL BLADE TO PUNCTURE OR CUT.: Brand: BARD-PARKER SAFETY BLADES SIZE 10, STERILE

## (undated) DEVICE — 3M™ STERI-STRIP™ COMPOUND BENZOIN TINCTURE 40 BAGS/CARTON 4 CARTONS/CASE C1544: Brand: 3M™ STERI-STRIP™

## (undated) DEVICE — ZIMMER® STERILE DISPOSABLE TOURNIQUET CUFF WITH PROTECTIVE SLEEVE AND PLC, DUAL PORT, SINGLE BLADDER, 34 IN. (86 CM)

## (undated) DEVICE — CATHETER SUCT TR FL TIP 14FR W/ O CTRL

## (undated) DEVICE — 3 ML SYRINGE WITH HYPODERMIC SAFETY NEEDLE: Brand: MAGELLAN

## (undated) DEVICE — GAUZE,SPONGE,4"X4",16PLY,STRL,LF,10/TRAY: Brand: MEDLINE

## (undated) DEVICE — SUTURE ETHLN SZ 4-0 L18IN NONABSORBABLE BLK L19MM PC-5 3/8 1894G

## (undated) DEVICE — SUTURE MCRYL SZ 4-0 L27IN ABSRB UD L24MM PS-1 3/8 CIR PRIM Y935H

## (undated) DEVICE — KIT CLN UP BON SECOURS MARYV

## (undated) DEVICE — SOLUTION IRRIG 3000ML 0.9% SOD CHL FLX CONT 0797208] ICU MEDICAL INC]

## (undated) DEVICE — ENDOSCOPY PUMP TUBING/ CAP SET: Brand: ERBE

## (undated) DEVICE — GARMENT,MEDLINE,DVT,INT,CALF,MED, GEN2: Brand: MEDLINE

## (undated) DEVICE — REM POLYHESIVE ADULT PATIENT RETURN ELECTRODE: Brand: VALLEYLAB

## (undated) DEVICE — 4-PORT MANIFOLD: Brand: NEPTUNE 2

## (undated) DEVICE — SUTURE VCRL SZ 2-0 L27IN ABSRB VLT L26MM CT-2 1/2 CIR J333H

## (undated) DEVICE — AIRLIFE™ NASAL OXYGEN CANNULA CURVED, NONFLARED TIP WITH 14 FOOT (4.3 M) CRUSH-RESISTANT TUBING, OVER-THE-EAR STYLE: Brand: AIRLIFE™

## (undated) DEVICE — FORCEPS BX L240CM JAW DIA2.8MM L CAP W/ NDL MIC MESH TOOTH

## (undated) DEVICE — SUTURE VCRL SZ 3-0 L27IN ABSRB VLT CT-2 L26MM 1/2 CIR J332H

## (undated) DEVICE — FLUFF AND POLYMER UNDERPAD,EXTRA HEAVY: Brand: WINGS

## (undated) DEVICE — KNEE ARTHROSCOPY III-LF: Brand: MEDLINE INDUSTRIES, INC.

## (undated) DEVICE — INFLOW CASSETTE TUBING, DO NOT USE IF PACKAGE IS DAMAGED: Brand: CROSSFLOW

## (undated) DEVICE — SNARE POLYP M W27MMXL240CM OVL STIFF DISP CAPTIVATOR

## (undated) DEVICE — NEEDLE HYPO 18GA L1.5IN PNK S STL HUB POLYPR SHLD REG BVL

## (undated) DEVICE — STRIP SKIN CLSR W0.25XL4IN WHT SPUNBOUND FBR NYL HI ADH

## (undated) DEVICE — SHOE POSTOP M WOMAN 6-8 UNIV FOAM TRICOT SEMI FLX SKID

## (undated) DEVICE — BANDAGE,GAUZE,BULKEE II,4.5"X4.1YD,STRL: Brand: MEDLINE

## (undated) DEVICE — FLEX ADVANTAGE 3000CC: Brand: FLEX ADVANTAGE

## (undated) DEVICE — GOWN ISOL IMPERV UNIV, DISP, OPEN BACK, BLUE --

## (undated) DEVICE — SYR 20ML LL STRL LF --

## (undated) DEVICE — DRAPE TWL SURG 16X26IN BLU ORB04] ALLCARE INC]

## (undated) DEVICE — DISPOSABLE TOURNIQUET CUFF SINGLE BLADDER, DUAL PORT AND QUICK CONNECT CONNECTOR: Brand: COLOR CUFF

## (undated) DEVICE — GOWN,REINFORCED,POLY,AURORA,XLARGE,STRL: Brand: MEDLINE

## (undated) DEVICE — BANDAGE,GAUZE,BULKEE LITE,3"X4.1YD,STRL: Brand: MEDLINE

## (undated) DEVICE — SHOE POSTOP HK LOOP MED FEM -- NYL RUBBER

## (undated) DEVICE — MAYO STAND COVER: Brand: CONVERTORS